# Patient Record
Sex: FEMALE | Race: WHITE
[De-identification: names, ages, dates, MRNs, and addresses within clinical notes are randomized per-mention and may not be internally consistent; named-entity substitution may affect disease eponyms.]

---

## 2017-01-05 ENCOUNTER — HOSPITAL ENCOUNTER (EMERGENCY)
Dept: HOSPITAL 83 - ED | Age: 47
Discharge: HOME | End: 2017-01-05
Payer: MEDICAID

## 2017-01-05 VITALS — BODY MASS INDEX: 21.97 KG/M2 | WEIGHT: 140 LBS | HEIGHT: 66.97 IN

## 2017-01-05 VITALS — DIASTOLIC BLOOD PRESSURE: 88 MMHG

## 2017-01-05 DIAGNOSIS — Z79.899: ICD-10-CM

## 2017-01-05 DIAGNOSIS — Z88.6: ICD-10-CM

## 2017-01-05 DIAGNOSIS — R03.0: ICD-10-CM

## 2017-01-05 DIAGNOSIS — M79.662: Primary | ICD-10-CM

## 2017-01-05 DIAGNOSIS — F17.200: ICD-10-CM

## 2017-03-30 ENCOUNTER — HOSPITAL ENCOUNTER (EMERGENCY)
Dept: HOSPITAL 83 - ED | Age: 47
Discharge: HOME | End: 2017-03-30
Payer: MEDICAID

## 2017-03-30 VITALS — WEIGHT: 120 LBS | HEIGHT: 65 IN | BODY MASS INDEX: 19.99 KG/M2

## 2017-03-30 VITALS — DIASTOLIC BLOOD PRESSURE: 100 MMHG

## 2017-03-30 DIAGNOSIS — Z79.899: ICD-10-CM

## 2017-03-30 DIAGNOSIS — F17.200: ICD-10-CM

## 2017-03-30 DIAGNOSIS — Z88.6: ICD-10-CM

## 2017-03-30 DIAGNOSIS — F32.2: Primary | ICD-10-CM

## 2017-03-30 LAB
ALBUMIN SERPL-MCNC: 3.6 GM/DL (ref 3.1–4.5)
ALP SERPL-CCNC: 68 U/L (ref 45–117)
ALT SERPL W P-5'-P-CCNC: 20 U/L (ref 12–78)
AMPHETAMINES UR QL SCN: < 1000
AST SERPL-CCNC: 22 IU/L (ref 3–35)
B-HCG SERPL-ACNC: (no result)
BARBITURATES UR QL SCN: < 200
BASOPHILS # BLD AUTO: 0.1 10*3/UL (ref 0–0.1)
BASOPHILS NFR BLD AUTO: 1 % (ref 0–1)
BUN SERPL-MCNC: 18 MG/DL (ref 7–24)
BZE UR QL SCN: > 300
CHLORIDE SERPL-SCNC: 105 MMOL/L (ref 98–107)
CO2 SERPL-SCNC: 28 MMOL/L (ref 21–32)
EOSINOPHIL # BLD AUTO: 0.3 10*3/UL (ref 0–0.4)
EOSINOPHIL # BLD AUTO: 3.1 % (ref 1–4)
ERYTHROCYTE [DISTWIDTH] IN BLOOD BY AUTOMATED COUNT: 13.6 % (ref 0–14.5)
GLUCOSE SERPL-MCNC: 91 MG/DL (ref 65–99)
HCT VFR BLD AUTO: 42.2 % (ref 37–47)
HGB BLD-MCNC: 14.3 G/DL (ref 12–16)
IG #: 0 10*3/UL (ref 0–0.1)
LYMPHOCYTES # BLD AUTO: 2.4 10*3/UL (ref 1.3–4.4)
LYMPHOCYTES NFR BLD AUTO: 26.9 % (ref 27–41)
MCH RBC QN AUTO: 32.1 PG (ref 27–31)
MCHC RBC AUTO-ENTMCNC: 33.9 G/DL (ref 33–37)
MCV RBC AUTO: 94.6 FL (ref 81–99)
MONOCYTES # BLD AUTO: 0.5 10*3/UL (ref 0.1–1)
MONOCYTES NFR BLD MANUAL: 5.6 % (ref 3–9)
MYOGLOBIN SERPL-MCNC: 64 NG/ML (ref 13–71)
NEUT #: 5.7 10*3/UL (ref 2.3–7.9)
NEUT %: 63.1 % (ref 47–73)
NRBC BLD QL AUTO: 0 % (ref 0–0)
PLATELET # BLD AUTO: 171 10*3/UL (ref 130–400)
PMV BLD AUTO: 11 FL (ref 9.6–12.3)
POTASSIUM SERPL-SCNC: 4.2 MMOL/L (ref 3.5–5.1)
PROT SERPL-MCNC: 7.4 GM/DL (ref 6.4–8.2)
RBC # BLD AUTO: 4.46 10*6/UL (ref 4.1–5.1)
SODIUM SERPL-SCNC: 141 MMOL/L (ref 136–145)
TROPONIN I SERPL-MCNC: < 0.015 NG/ML (ref ?–0.04)
WBC NRBC COR # BLD AUTO: 9.1 10*3/UL (ref 4.8–10.8)

## 2017-03-31 LAB
ALBUMIN SERPL-MCNC: NEGATIVE G/DL
APPEARANCE UR: (no result)
BACTERIA #/AREA URNS HPF: (no result) /[HPF]
BILIRUB UR QL STRIP: NEGATIVE
COLOR UR: YELLOW
CRYSTALS URNS MICRO: (no result)
EPI CELLS #/AREA URNS HPF: (no result) /[HPF]
GLUCOSE UR QL: NEGATIVE
HGB UR QL STRIP: NEGATIVE
KETONES UR QL STRIP: NEGATIVE
LEUKOCYTE ESTERASE UR QL STRIP: NEGATIVE
NITRITE UR QL STRIP: NEGATIVE
PH UR STRIP: 6.5 [PH] (ref 5–9)
RBC #/AREA URNS HPF: (no result) RBC/HPF (ref 0–2)
SP GR UR: 1.02 (ref 1–1.03)
URINE REFLEX COMMENT: YES
UROBILINOGEN UR STRIP-MCNC: 0.2 E.U./DL (ref 0.2–1)
WBC #/AREA URNS HPF: (no result) WBC/HPF (ref 0–5)

## 2017-08-16 ENCOUNTER — HOSPITAL ENCOUNTER (EMERGENCY)
Dept: HOSPITAL 83 - ED | Age: 47
Discharge: HOME | End: 2017-08-16
Payer: MEDICAID

## 2017-08-16 VITALS — SYSTOLIC BLOOD PRESSURE: 152 MMHG | DIASTOLIC BLOOD PRESSURE: 100 MMHG

## 2017-08-16 VITALS — WEIGHT: 145 LBS | HEIGHT: 67.99 IN | BODY MASS INDEX: 21.98 KG/M2

## 2017-08-16 DIAGNOSIS — F17.200: ICD-10-CM

## 2017-08-16 DIAGNOSIS — R31.9: ICD-10-CM

## 2017-08-16 DIAGNOSIS — Z79.899: ICD-10-CM

## 2017-08-16 DIAGNOSIS — N39.0: Primary | ICD-10-CM

## 2017-08-16 DIAGNOSIS — Z88.6: ICD-10-CM

## 2017-08-16 DIAGNOSIS — Z98.890: ICD-10-CM

## 2017-08-16 LAB
ALBUMIN SERPL-MCNC: (no result) G/DL
ALBUMIN SERPL-MCNC: 3.8 GM/DL (ref 3.1–4.5)
ALP SERPL-CCNC: 72 U/L (ref 45–117)
ALT SERPL W P-5'-P-CCNC: 18 U/L (ref 12–78)
APPEARANCE UR: (no result)
AST SERPL-CCNC: 26 IU/L (ref 3–35)
BACTERIA #/AREA URNS HPF: (no result) /[HPF]
BASOPHILS # BLD AUTO: 0.1 10*3/UL (ref 0–0.1)
BASOPHILS NFR BLD AUTO: 0.9 % (ref 0–1)
BILIRUB UR QL STRIP: NEGATIVE
BUN SERPL-MCNC: 19 MG/DL (ref 7–24)
CHLORIDE SERPL-SCNC: 105 MMOL/L (ref 98–107)
CO2 SERPL-SCNC: 21 MMOL/L (ref 21–32)
COLOR UR: YELLOW
CRP SERPL-MCNC: 0.45 MG/DL (ref 0–0.3)
EOSINOPHIL # BLD AUTO: 0.2 10*3/UL (ref 0–0.4)
EOSINOPHIL # BLD AUTO: 2 % (ref 1–4)
ERYTHROCYTE [DISTWIDTH] IN BLOOD BY AUTOMATED COUNT: 13.1 % (ref 0–14.5)
GLUCOSE SERPL-MCNC: 122 MG/DL (ref 65–99)
GLUCOSE UR QL: NEGATIVE
HCT VFR BLD AUTO: 47.4 % (ref 37–47)
HGB BLD-MCNC: 15.9 G/DL (ref 12–16)
HGB UR QL STRIP: (no result)
IG #: 0 10*3/UL (ref 0–0.1)
KETONES UR QL STRIP: NEGATIVE
LEUKOCYTE ESTERASE UR QL STRIP: NEGATIVE
LYMPHOCYTES # BLD AUTO: 2.4 10*3/UL (ref 1.3–4.4)
LYMPHOCYTES NFR BLD AUTO: 23.6 % (ref 27–41)
MAGNESIUM SERPL-MCNC: 1.9 MG/DL (ref 1.5–2.1)
MCH RBC QN AUTO: 32.4 PG (ref 27–31)
MCHC RBC AUTO-ENTMCNC: 33.5 G/DL (ref 33–37)
MCV RBC AUTO: 96.7 FL (ref 81–99)
MONOCYTES # BLD AUTO: 0.7 10*3/UL (ref 0.1–1)
MONOCYTES NFR BLD MANUAL: 7 % (ref 3–9)
NEUT #: 6.7 10*3/UL (ref 2.3–7.9)
NEUT %: 66.2 % (ref 47–73)
NITRITE UR QL STRIP: NEGATIVE
NRBC BLD QL AUTO: 0 10*3/UL (ref 0–0)
PH UR STRIP: 6 [PH] (ref 5–9)
PLATELET # BLD AUTO: 227 10*3/UL (ref 130–400)
PMV BLD AUTO: 11.1 FL (ref 9.6–12.3)
POTASSIUM SERPL-SCNC: 5 MMOL/L (ref 3.5–5.1)
PROT SERPL-MCNC: 8.3 GM/DL (ref 6.4–8.2)
RBC # BLD AUTO: 4.9 10*6/UL (ref 4.1–5.1)
RBC #/AREA URNS HPF: (no result) RBC/HPF (ref 0–2)
SODIUM SERPL-SCNC: 135 MMOL/L (ref 136–145)
SP GR UR: >= 1.03 (ref 1–1.03)
URINE REFLEX COMMENT: YES
UROBILINOGEN UR STRIP-MCNC: 0.2 E.U./DL (ref 0.2–1)
WBC #/AREA URNS HPF: (no result) WBC/HPF (ref 0–5)
WBC NRBC COR # BLD AUTO: 10.1 10*3/UL (ref 4.8–10.8)

## 2017-08-17 ENCOUNTER — HOSPITAL ENCOUNTER (EMERGENCY)
Dept: HOSPITAL 83 - ED | Age: 47
End: 2017-08-17
Payer: MEDICAID

## 2017-08-17 VITALS — SYSTOLIC BLOOD PRESSURE: 142 MMHG | DIASTOLIC BLOOD PRESSURE: 80 MMHG

## 2017-08-17 VITALS — HEIGHT: 55 IN | WEIGHT: 148 LBS

## 2017-08-17 DIAGNOSIS — Z88.6: ICD-10-CM

## 2017-08-17 DIAGNOSIS — N39.0: Primary | ICD-10-CM

## 2017-08-17 DIAGNOSIS — Z98.890: ICD-10-CM

## 2017-08-17 DIAGNOSIS — Z79.899: ICD-10-CM

## 2017-08-17 DIAGNOSIS — F17.200: ICD-10-CM

## 2017-08-17 DIAGNOSIS — F32.9: ICD-10-CM

## 2017-08-26 ENCOUNTER — HOSPITAL ENCOUNTER (INPATIENT)
Dept: HOSPITAL 83 - ED | Age: 47
LOS: 5 days | Discharge: HOME | DRG: 872 | End: 2017-08-31
Attending: INTERNAL MEDICINE | Admitting: INTERNAL MEDICINE
Payer: MEDICAID

## 2017-08-26 VITALS — SYSTOLIC BLOOD PRESSURE: 121 MMHG | DIASTOLIC BLOOD PRESSURE: 86 MMHG

## 2017-08-26 VITALS — BODY MASS INDEX: 23.23 KG/M2 | HEIGHT: 66.97 IN | WEIGHT: 148 LBS

## 2017-08-26 VITALS — DIASTOLIC BLOOD PRESSURE: 91 MMHG | SYSTOLIC BLOOD PRESSURE: 112 MMHG

## 2017-08-26 VITALS — DIASTOLIC BLOOD PRESSURE: 74 MMHG

## 2017-08-26 DIAGNOSIS — A41.9: Primary | ICD-10-CM

## 2017-08-26 DIAGNOSIS — Z81.1: ICD-10-CM

## 2017-08-26 DIAGNOSIS — R82.2: ICD-10-CM

## 2017-08-26 DIAGNOSIS — Z84.89: ICD-10-CM

## 2017-08-26 DIAGNOSIS — F31.60: ICD-10-CM

## 2017-08-26 DIAGNOSIS — E83.51: ICD-10-CM

## 2017-08-26 DIAGNOSIS — K21.9: ICD-10-CM

## 2017-08-26 DIAGNOSIS — Z82.49: ICD-10-CM

## 2017-08-26 DIAGNOSIS — R31.9: ICD-10-CM

## 2017-08-26 DIAGNOSIS — Z59.0: ICD-10-CM

## 2017-08-26 DIAGNOSIS — F17.210: ICD-10-CM

## 2017-08-26 DIAGNOSIS — N39.0: ICD-10-CM

## 2017-08-26 DIAGNOSIS — Z88.6: ICD-10-CM

## 2017-08-26 LAB
ALBUMIN SERPL-MCNC: 3.7 GM/DL (ref 3.1–4.5)
ALP SERPL-CCNC: 66 U/L (ref 45–117)
ALT SERPL W P-5'-P-CCNC: 15 U/L (ref 12–78)
APPEARANCE UR: (no result)
AST SERPL-CCNC: 14 IU/L (ref 3–35)
BACTERIA #/AREA URNS HPF: (no result) /[HPF]
BASOPHILS # BLD AUTO: 0.1 10*3/UL (ref 0–0.1)
BASOPHILS NFR BLD AUTO: 0.8 % (ref 0–1)
BILIRUB UR QL STRIP: (no result)
BUN SERPL-MCNC: 19 MG/DL (ref 7–24)
CHLORIDE SERPL-SCNC: 106 MMOL/L (ref 98–107)
COLOR UR: YELLOW
CREAT SERPL-MCNC: 0.93 MG/DL (ref 0.55–1.02)
EOSINOPHIL # BLD AUTO: 0.2 10*3/UL (ref 0–0.4)
EOSINOPHIL # BLD AUTO: 1.7 % (ref 1–4)
EPI CELLS #/AREA URNS HPF: (no result) /[HPF]
ERYTHROCYTE [DISTWIDTH] IN BLOOD BY AUTOMATED COUNT: 12.6 % (ref 0–14.5)
GLUCOSE UR QL: NEGATIVE
HCT VFR BLD AUTO: 46 % (ref 37–47)
HGB BLD-MCNC: 15.4 G/DL (ref 12–16)
HGB UR QL STRIP: (no result)
KETONES UR QL STRIP: (no result)
LEUKOCYTE ESTERASE UR QL STRIP: (no result)
LYMPHOCYTES # BLD AUTO: 2.3 10*3/UL (ref 1.3–4.4)
LYMPHOCYTES NFR BLD AUTO: 23.4 % (ref 27–41)
MCH RBC QN AUTO: 32.6 PG (ref 27–31)
MCHC RBC AUTO-ENTMCNC: 33.5 G/DL (ref 33–37)
MCV RBC AUTO: 97.3 FL (ref 81–99)
MONOCYTES # BLD AUTO: 0.5 10*3/UL (ref 0.1–1)
MONOCYTES NFR BLD MANUAL: 5.6 % (ref 3–9)
NEUT #: 6.6 10*3/UL (ref 2.3–7.9)
NEUT %: 68.2 % (ref 47–73)
NITRITE UR QL STRIP: NEGATIVE
NRBC BLD QL AUTO: 0 10*3/UL (ref 0–0)
PH UR STRIP: 6 [PH] (ref 5–9)
PLATELET # BLD AUTO: 195 10*3/UL (ref 130–400)
PMV BLD AUTO: 11.9 FL (ref 9.6–12.3)
POTASSIUM SERPL-SCNC: 4.3 MMOL/L (ref 3.5–5.1)
PROT SERPL-MCNC: 7.5 GM/DL (ref 6.4–8.2)
RBC # BLD AUTO: 4.73 10*6/UL (ref 4.1–5.1)
SODIUM SERPL-SCNC: 139 MMOL/L (ref 136–145)
SP GR UR: >= 1.03 (ref 1–1.03)
UROBILINOGEN UR STRIP-MCNC: 0.2 E.U./DL (ref 0.2–1)
WBC NRBC COR # BLD AUTO: 9.7 10*3/UL (ref 4.8–10.8)

## 2017-08-26 SDOH — ECONOMIC STABILITY - HOUSING INSECURITY: HOMELESSNESS: Z59.0

## 2017-08-27 VITALS — DIASTOLIC BLOOD PRESSURE: 100 MMHG

## 2017-08-27 VITALS — DIASTOLIC BLOOD PRESSURE: 82 MMHG

## 2017-08-27 VITALS — SYSTOLIC BLOOD PRESSURE: 138 MMHG | DIASTOLIC BLOOD PRESSURE: 90 MMHG

## 2017-08-27 VITALS — DIASTOLIC BLOOD PRESSURE: 88 MMHG | SYSTOLIC BLOOD PRESSURE: 122 MMHG

## 2017-08-27 VITALS — SYSTOLIC BLOOD PRESSURE: 134 MMHG | DIASTOLIC BLOOD PRESSURE: 90 MMHG

## 2017-08-27 LAB
25(OH)D3 SERPL-MCNC: 71.1 NG/ML (ref 30–100)
ALBUMIN SERPL-MCNC: 3.4 GM/DL (ref 3.1–4.5)
ALP SERPL-CCNC: 79 U/L (ref 45–117)
ALT SERPL W P-5'-P-CCNC: 13 U/L (ref 12–78)
APTT PPP: 26.7 SECONDS (ref 20.8–31.5)
AST SERPL-CCNC: 14 IU/L (ref 3–35)
BASOPHILS # BLD AUTO: 0.1 10*3/UL (ref 0–0.1)
BASOPHILS NFR BLD AUTO: 0.7 % (ref 0–1)
BUN SERPL-MCNC: 14 MG/DL (ref 7–24)
CHLORIDE SERPL-SCNC: 109 MMOL/L (ref 98–107)
CHOLEST SERPL-MCNC: 171 MG/DL (ref ?–200)
CREAT SERPL-MCNC: 0.71 MG/DL (ref 0.55–1.02)
EOSINOPHIL # BLD AUTO: 0.4 10*3/UL (ref 0–0.4)
EOSINOPHIL # BLD AUTO: 3.7 % (ref 1–4)
ERYTHROCYTE [DISTWIDTH] IN BLOOD BY AUTOMATED COUNT: 12.7 % (ref 0–14.5)
HCT VFR BLD AUTO: 44.7 % (ref 37–47)
HDLC SERPL-MCNC: 86 MG/DL (ref 40–60)
HGB BLD-MCNC: 15 G/DL (ref 12–16)
INR BLD: 1 (ref 2–3.5)
LDLC SERPL DIRECT ASSAY-MCNC: 71 MG/DL (ref 9–159)
LYMPHOCYTES # BLD AUTO: 3.7 10*3/UL (ref 1.3–4.4)
LYMPHOCYTES NFR BLD AUTO: 31 % (ref 27–41)
MAGNESIUM SERPL-MCNC: 2 MG/DL (ref 1.5–2.1)
MCH RBC QN AUTO: 33.2 PG (ref 27–31)
MCHC RBC AUTO-ENTMCNC: 33.6 G/DL (ref 33–37)
MCV RBC AUTO: 98.9 FL (ref 81–99)
MONOCYTES # BLD AUTO: 0.7 10*3/UL (ref 0.1–1)
MONOCYTES NFR BLD MANUAL: 6 % (ref 3–9)
NEUT #: 6.8 10*3/UL (ref 2.3–7.9)
NEUT %: 58.3 % (ref 47–73)
NRBC BLD QL AUTO: 0 10*3/UL (ref 0–0)
PHOSPHATE SERPL-MCNC: 3.7 MG/DL (ref 2.5–4.9)
PLATELET # BLD AUTO: 170 10*3/UL (ref 130–400)
PMV BLD AUTO: 11.9 FL (ref 9.6–12.3)
POTASSIUM SERPL-SCNC: 4 MMOL/L (ref 3.5–5.1)
PROT SERPL-MCNC: 7 GM/DL (ref 6.4–8.2)
RBC # BLD AUTO: 4.52 10*6/UL (ref 4.1–5.1)
SODIUM SERPL-SCNC: 141 MMOL/L (ref 136–145)
T4 FREE SERPL-MCNC: 1.16 NG/DL (ref 0.76–1.46)
TRIGL SERPL-MCNC: 68 MG/DL (ref ?–150)
TSH SERPL DL<=0.005 MIU/L-ACNC: 1.96 UIU/ML (ref 0.36–4.75)
VITAMIN B12: 251 PG/ML (ref 247–911)
VLDLC SERPL CALC-MCNC: 14 MG/DL (ref 6–40)
WBC NRBC COR # BLD AUTO: 11.8 10*3/UL (ref 4.8–10.8)

## 2017-08-28 VITALS — DIASTOLIC BLOOD PRESSURE: 83 MMHG

## 2017-08-28 VITALS — DIASTOLIC BLOOD PRESSURE: 86 MMHG

## 2017-08-28 VITALS — SYSTOLIC BLOOD PRESSURE: 148 MMHG | DIASTOLIC BLOOD PRESSURE: 80 MMHG

## 2017-08-28 VITALS — DIASTOLIC BLOOD PRESSURE: 78 MMHG

## 2017-08-28 VITALS — DIASTOLIC BLOOD PRESSURE: 60 MMHG

## 2017-08-28 LAB
BUN SERPL-MCNC: 15 MG/DL (ref 7–24)
CHLORIDE SERPL-SCNC: 104 MMOL/L (ref 98–107)
CREAT SERPL-MCNC: 0.8 MG/DL (ref 0.55–1.02)
POTASSIUM SERPL-SCNC: 4.1 MMOL/L (ref 3.5–5.1)
SODIUM SERPL-SCNC: 138 MMOL/L (ref 136–145)

## 2017-08-28 PROCEDURE — 02HV33Z INSERTION OF INFUSION DEVICE INTO SUPERIOR VENA CAVA, PERCUTANEOUS APPROACH: ICD-10-PCS | Performed by: INTERNAL MEDICINE

## 2017-08-29 VITALS — DIASTOLIC BLOOD PRESSURE: 66 MMHG | SYSTOLIC BLOOD PRESSURE: 108 MMHG

## 2017-08-29 VITALS — DIASTOLIC BLOOD PRESSURE: 76 MMHG

## 2017-08-29 VITALS — DIASTOLIC BLOOD PRESSURE: 75 MMHG

## 2017-08-29 VITALS — DIASTOLIC BLOOD PRESSURE: 86 MMHG

## 2017-08-29 VITALS — DIASTOLIC BLOOD PRESSURE: 77 MMHG | SYSTOLIC BLOOD PRESSURE: 95 MMHG

## 2017-08-29 LAB
BASOPHILS # BLD AUTO: 0.1 10*3/UL (ref 0–0.1)
BASOPHILS NFR BLD AUTO: 0.7 % (ref 0–1)
BUN SERPL-MCNC: 17 MG/DL (ref 7–24)
CHLORIDE SERPL-SCNC: 104 MMOL/L (ref 98–107)
CREAT SERPL-MCNC: 0.78 MG/DL (ref 0.55–1.02)
EOSINOPHIL # BLD AUTO: 0.4 10*3/UL (ref 0–0.4)
EOSINOPHIL # BLD AUTO: 4 % (ref 1–4)
ERYTHROCYTE [DISTWIDTH] IN BLOOD BY AUTOMATED COUNT: 12.7 % (ref 0–14.5)
HCT VFR BLD AUTO: 42.9 % (ref 37–47)
HGB BLD-MCNC: 14.1 G/DL (ref 12–16)
LYMPHOCYTES # BLD AUTO: 3.2 10*3/UL (ref 1.3–4.4)
LYMPHOCYTES NFR BLD AUTO: 30.3 % (ref 27–41)
MCH RBC QN AUTO: 31.9 PG (ref 27–31)
MCHC RBC AUTO-ENTMCNC: 32.9 G/DL (ref 33–37)
MCV RBC AUTO: 97.1 FL (ref 81–99)
MONOCYTES # BLD AUTO: 0.7 10*3/UL (ref 0.1–1)
MONOCYTES NFR BLD MANUAL: 6.3 % (ref 3–9)
NEUT #: 6.2 10*3/UL (ref 2.3–7.9)
NEUT %: 58.5 % (ref 47–73)
NRBC BLD QL AUTO: 0 % (ref 0–0)
PLATELET # BLD AUTO: 159 10*3/UL (ref 130–400)
PMV BLD AUTO: 11.7 FL (ref 9.6–12.3)
POTASSIUM SERPL-SCNC: 4.1 MMOL/L (ref 3.5–5.1)
RBC # BLD AUTO: 4.42 10*6/UL (ref 4.1–5.1)
SODIUM SERPL-SCNC: 137 MMOL/L (ref 136–145)
WBC NRBC COR # BLD AUTO: 10.6 10*3/UL (ref 4.8–10.8)

## 2017-08-30 VITALS — DIASTOLIC BLOOD PRESSURE: 83 MMHG | SYSTOLIC BLOOD PRESSURE: 126 MMHG

## 2017-08-30 VITALS — DIASTOLIC BLOOD PRESSURE: 96 MMHG | SYSTOLIC BLOOD PRESSURE: 128 MMHG

## 2017-08-30 VITALS — DIASTOLIC BLOOD PRESSURE: 52 MMHG | SYSTOLIC BLOOD PRESSURE: 99 MMHG

## 2017-08-30 VITALS — DIASTOLIC BLOOD PRESSURE: 76 MMHG

## 2017-08-30 VITALS — DIASTOLIC BLOOD PRESSURE: 89 MMHG

## 2017-08-31 VITALS — DIASTOLIC BLOOD PRESSURE: 89 MMHG

## 2017-08-31 VITALS — DIASTOLIC BLOOD PRESSURE: 68 MMHG | SYSTOLIC BLOOD PRESSURE: 114 MMHG

## 2017-09-24 ENCOUNTER — HOSPITAL ENCOUNTER (EMERGENCY)
Dept: HOSPITAL 83 - ED | Age: 47
Discharge: HOME | End: 2017-09-24
Payer: MEDICAID

## 2017-09-24 VITALS — SYSTOLIC BLOOD PRESSURE: 131 MMHG | DIASTOLIC BLOOD PRESSURE: 89 MMHG

## 2017-09-24 VITALS — HEIGHT: 67.99 IN | WEIGHT: 148 LBS | BODY MASS INDEX: 22.43 KG/M2

## 2017-09-24 DIAGNOSIS — R31.9: ICD-10-CM

## 2017-09-24 DIAGNOSIS — F17.200: ICD-10-CM

## 2017-09-24 DIAGNOSIS — Z88.6: ICD-10-CM

## 2017-09-24 DIAGNOSIS — N39.0: Primary | ICD-10-CM

## 2017-09-24 LAB
APPEARANCE UR: (no result)
BACTERIA #/AREA URNS HPF: (no result) /[HPF]
BILIRUB UR QL STRIP: NEGATIVE
COLOR UR: YELLOW
CRYSTALS URNS MICRO: (no result)
EPI CELLS #/AREA URNS HPF: (no result) /[HPF]
GLUCOSE UR QL: NEGATIVE
HGB UR QL STRIP: (no result)
KETONES UR QL STRIP: NEGATIVE
LEUKOCYTE ESTERASE UR QL STRIP: NEGATIVE
NITRITE UR QL STRIP: NEGATIVE
PH UR STRIP: 5 [PH] (ref 5–9)
RBC #/AREA URNS HPF: (no result) RBC/HPF (ref 0–2)
SP GR UR: >= 1.03 (ref 1–1.03)
UROBILINOGEN UR STRIP-MCNC: 0.2 E.U./DL (ref 0.2–1)
WBC #/AREA URNS HPF: (no result) WBC/HPF (ref 0–5)

## 2017-12-22 ENCOUNTER — HOSPITAL ENCOUNTER (INPATIENT)
Dept: HOSPITAL 83 - ED | Age: 47
LOS: 4 days | Discharge: HOME | DRG: 206 | End: 2017-12-26
Attending: EMERGENCY MEDICINE | Admitting: EMERGENCY MEDICINE
Payer: MEDICAID

## 2017-12-22 VITALS — WEIGHT: 142.13 LBS | DIASTOLIC BLOOD PRESSURE: 82 MMHG | HEIGHT: 68 IN | BODY MASS INDEX: 21.54 KG/M2

## 2017-12-22 VITALS — SYSTOLIC BLOOD PRESSURE: 114 MMHG | DIASTOLIC BLOOD PRESSURE: 78 MMHG

## 2017-12-22 VITALS — SYSTOLIC BLOOD PRESSURE: 140 MMHG | DIASTOLIC BLOOD PRESSURE: 85 MMHG

## 2017-12-22 VITALS — DIASTOLIC BLOOD PRESSURE: 89 MMHG

## 2017-12-22 DIAGNOSIS — G43.909: ICD-10-CM

## 2017-12-22 DIAGNOSIS — M94.0: Primary | ICD-10-CM

## 2017-12-22 DIAGNOSIS — Z79.899: ICD-10-CM

## 2017-12-22 DIAGNOSIS — Z81.1: ICD-10-CM

## 2017-12-22 DIAGNOSIS — K21.9: ICD-10-CM

## 2017-12-22 DIAGNOSIS — Z82.49: ICD-10-CM

## 2017-12-22 DIAGNOSIS — F17.210: ICD-10-CM

## 2017-12-22 DIAGNOSIS — Z88.6: ICD-10-CM

## 2017-12-22 DIAGNOSIS — J44.9: ICD-10-CM

## 2017-12-22 DIAGNOSIS — R45.851: ICD-10-CM

## 2017-12-22 DIAGNOSIS — F41.9: ICD-10-CM

## 2017-12-22 DIAGNOSIS — F31.60: ICD-10-CM

## 2017-12-22 LAB
ALBUMIN SERPL-MCNC: 3.7 GM/DL (ref 3.1–4.5)
ALP SERPL-CCNC: 71 U/L (ref 45–117)
ALT SERPL W P-5'-P-CCNC: 26 U/L (ref 12–78)
AMPHETAMINES UR QL SCN: < 1000
APPEARANCE UR: CLEAR
APTT PPP: 25.8 SECONDS (ref 20.8–31.5)
AST SERPL-CCNC: 19 IU/L (ref 3–35)
BACTERIA #/AREA URNS HPF: (no result) /[HPF]
BARBITURATES UR QL SCN: < 200
BASOPHILS # BLD AUTO: 0.1 10*3/UL (ref 0–0.1)
BASOPHILS NFR BLD AUTO: 0.9 % (ref 0–1)
BENZODIAZ UR QL SCN: < 200
BILIRUB UR QL STRIP: NEGATIVE
BUN SERPL-MCNC: 16 MG/DL (ref 7–24)
BZE UR QL SCN: < 300
CANNABINOIDS UR QL SCN: < 50
CHLORIDE SERPL-SCNC: 102 MMOL/L (ref 98–107)
COLOR UR: YELLOW
CREAT SERPL-MCNC: 0.84 MG/DL (ref 0.55–1.02)
EOSINOPHIL # BLD AUTO: 0.2 10*3/UL (ref 0–0.4)
EOSINOPHIL # BLD AUTO: 2.1 % (ref 1–4)
EPI CELLS #/AREA URNS HPF: (no result) /[HPF]
ERYTHROCYTE [DISTWIDTH] IN BLOOD BY AUTOMATED COUNT: 12.1 % (ref 0–14.5)
GLUCOSE UR QL: NEGATIVE
HCT VFR BLD AUTO: 40.9 % (ref 37–47)
HGB BLD-MCNC: 13.9 G/DL (ref 12–16)
HGB UR QL STRIP: NEGATIVE
INR BLD: 1 (ref 2–3.5)
KETONES UR QL STRIP: NEGATIVE
LEUKOCYTE ESTERASE UR QL STRIP: (no result)
LYMPHOCYTES # BLD AUTO: 2.6 10*3/UL (ref 1.3–4.4)
LYMPHOCYTES NFR BLD AUTO: 30.2 % (ref 27–41)
MCH RBC QN AUTO: 32.6 PG (ref 27–31)
MCHC RBC AUTO-ENTMCNC: 34 G/DL (ref 33–37)
MCV RBC AUTO: 96 FL (ref 81–99)
METHADONE UR QL SCN: < 300
MONOCYTES # BLD AUTO: 0.6 10*3/UL (ref 0.1–1)
MONOCYTES NFR BLD MANUAL: 6.9 % (ref 3–9)
NEUT #: 5.1 10*3/UL (ref 2.3–7.9)
NEUT %: 59.7 % (ref 47–73)
NITRITE UR QL STRIP: NEGATIVE
NRBC BLD QL AUTO: 0 10*3/UL (ref 0–0)
OPIATES UR QL SCN: < 300
PCP UR QL SCN: <  25
PH UR STRIP: 6 [PH] (ref 5–9)
PLATELET # BLD AUTO: 208 10*3/UL (ref 130–400)
PMV BLD AUTO: 10.8 FL (ref 9.6–12.3)
POTASSIUM SERPL-SCNC: 3.9 MMOL/L (ref 3.5–5.1)
PROT SERPL-MCNC: 6.9 GM/DL (ref 6.4–8.2)
RBC # BLD AUTO: 4.26 10*6/UL (ref 4.1–5.1)
SODIUM SERPL-SCNC: 137 MMOL/L (ref 136–145)
SP GR UR: 1.01 (ref 1–1.03)
TROPONIN I SERPL-MCNC: < 0.015 NG/ML (ref ?–0.04)
UROBILINOGEN UR STRIP-MCNC: 0.2 E.U./DL (ref 0.2–1)
WBC #/AREA URNS HPF: (no result) WBC/HPF (ref 0–5)
WBC NRBC COR # BLD AUTO: 8.5 10*3/UL (ref 4.8–10.8)

## 2017-12-23 VITALS — DIASTOLIC BLOOD PRESSURE: 68 MMHG | SYSTOLIC BLOOD PRESSURE: 104 MMHG

## 2017-12-23 VITALS — DIASTOLIC BLOOD PRESSURE: 83 MMHG | SYSTOLIC BLOOD PRESSURE: 130 MMHG

## 2017-12-23 VITALS — SYSTOLIC BLOOD PRESSURE: 147 MMHG | DIASTOLIC BLOOD PRESSURE: 84 MMHG

## 2017-12-23 VITALS — SYSTOLIC BLOOD PRESSURE: 153 MMHG | DIASTOLIC BLOOD PRESSURE: 78 MMHG

## 2017-12-23 VITALS — DIASTOLIC BLOOD PRESSURE: 86 MMHG

## 2017-12-23 VITALS — DIASTOLIC BLOOD PRESSURE: 66 MMHG

## 2017-12-23 LAB
25(OH)D3 SERPL-MCNC: 34.1 NG/ML (ref 30–100)
ALBUMIN SERPL-MCNC: 3.3 GM/DL (ref 3.1–4.5)
ALP SERPL-CCNC: 63 U/L (ref 45–117)
ALT SERPL W P-5'-P-CCNC: 23 U/L (ref 12–78)
AST SERPL-CCNC: 18 IU/L (ref 3–35)
BASOPHILS # BLD AUTO: 0.1 10*3/UL (ref 0–0.1)
BASOPHILS NFR BLD AUTO: 0.8 % (ref 0–1)
BUN SERPL-MCNC: 13 MG/DL (ref 7–24)
CHLORIDE SERPL-SCNC: 105 MMOL/L (ref 98–107)
CHOLEST SERPL-MCNC: 182 MG/DL (ref ?–200)
CREAT SERPL-MCNC: 0.75 MG/DL (ref 0.55–1.02)
EOSINOPHIL # BLD AUTO: 0.3 10*3/UL (ref 0–0.4)
EOSINOPHIL # BLD AUTO: 4 % (ref 1–4)
ERYTHROCYTE [DISTWIDTH] IN BLOOD BY AUTOMATED COUNT: 12.1 % (ref 0–14.5)
HCT VFR BLD AUTO: 43.3 % (ref 37–47)
HDLC SERPL-MCNC: 88 MG/DL (ref 40–60)
HGB BLD-MCNC: 14.8 G/DL (ref 12–16)
INR BLD: 1 (ref 2–3.5)
LDLC SERPL DIRECT ASSAY-MCNC: 76 MG/DL (ref 9–159)
LYMPHOCYTES # BLD AUTO: 3.8 10*3/UL (ref 1.3–4.4)
LYMPHOCYTES NFR BLD AUTO: 46.3 % (ref 27–41)
MCH RBC QN AUTO: 33.1 PG (ref 27–31)
MCHC RBC AUTO-ENTMCNC: 34.2 G/DL (ref 33–37)
MCV RBC AUTO: 96.9 FL (ref 81–99)
MONOCYTES # BLD AUTO: 0.5 10*3/UL (ref 0.1–1)
MONOCYTES NFR BLD MANUAL: 6.5 % (ref 3–9)
NEUT #: 3.5 10*3/UL (ref 2.3–7.9)
NEUT %: 42.3 % (ref 47–73)
NRBC BLD QL AUTO: 0 % (ref 0–0)
PHOSPHATE SERPL-MCNC: 4.4 MG/DL (ref 2.5–4.9)
PLATELET # BLD AUTO: 200 10*3/UL (ref 130–400)
PMV BLD AUTO: 10.8 FL (ref 9.6–12.3)
POTASSIUM SERPL-SCNC: 3.8 MMOL/L (ref 3.5–5.1)
PROT SERPL-MCNC: 6.8 GM/DL (ref 6.4–8.2)
RBC # BLD AUTO: 4.47 10*6/UL (ref 4.1–5.1)
SODIUM SERPL-SCNC: 139 MMOL/L (ref 136–145)
T4 FREE SERPL-MCNC: 1.1 NG/DL (ref 0.76–1.46)
TRIGL SERPL-MCNC: 90 MG/DL (ref ?–150)
TSH SERPL DL<=0.005 MIU/L-ACNC: 3.79 UIU/ML (ref 0.36–4.75)
VITAMIN B12: 428 PG/ML (ref 247–911)
VLDLC SERPL CALC-MCNC: 18 MG/DL (ref 6–40)
WBC NRBC COR # BLD AUTO: 8.3 10*3/UL (ref 4.8–10.8)

## 2017-12-24 VITALS — DIASTOLIC BLOOD PRESSURE: 70 MMHG | SYSTOLIC BLOOD PRESSURE: 159 MMHG

## 2017-12-24 VITALS — SYSTOLIC BLOOD PRESSURE: 126 MMHG | DIASTOLIC BLOOD PRESSURE: 75 MMHG

## 2017-12-24 VITALS — DIASTOLIC BLOOD PRESSURE: 87 MMHG | SYSTOLIC BLOOD PRESSURE: 135 MMHG

## 2017-12-24 VITALS — SYSTOLIC BLOOD PRESSURE: 114 MMHG | DIASTOLIC BLOOD PRESSURE: 72 MMHG

## 2017-12-24 VITALS — DIASTOLIC BLOOD PRESSURE: 81 MMHG

## 2017-12-24 VITALS — DIASTOLIC BLOOD PRESSURE: 74 MMHG

## 2017-12-25 VITALS — SYSTOLIC BLOOD PRESSURE: 120 MMHG | DIASTOLIC BLOOD PRESSURE: 70 MMHG

## 2017-12-25 VITALS — DIASTOLIC BLOOD PRESSURE: 61 MMHG | SYSTOLIC BLOOD PRESSURE: 90 MMHG

## 2017-12-25 VITALS — DIASTOLIC BLOOD PRESSURE: 76 MMHG | SYSTOLIC BLOOD PRESSURE: 104 MMHG

## 2017-12-25 VITALS — SYSTOLIC BLOOD PRESSURE: 110 MMHG | DIASTOLIC BLOOD PRESSURE: 83 MMHG

## 2017-12-25 VITALS — DIASTOLIC BLOOD PRESSURE: 76 MMHG

## 2017-12-25 VITALS — DIASTOLIC BLOOD PRESSURE: 90 MMHG

## 2017-12-26 VITALS — DIASTOLIC BLOOD PRESSURE: 68 MMHG

## 2017-12-26 VITALS — DIASTOLIC BLOOD PRESSURE: 67 MMHG

## 2017-12-26 VITALS — DIASTOLIC BLOOD PRESSURE: 77 MMHG

## 2017-12-26 LAB
BASOPHILS # BLD AUTO: 0.1 10*3/UL (ref 0–0.1)
BASOPHILS NFR BLD AUTO: 0.9 % (ref 0–1)
CREAT SERPL-MCNC: 0.74 MG/DL (ref 0.55–1.02)
EOSINOPHIL # BLD AUTO: 0.4 10*3/UL (ref 0–0.4)
EOSINOPHIL # BLD AUTO: 5.1 % (ref 1–4)
ERYTHROCYTE [DISTWIDTH] IN BLOOD BY AUTOMATED COUNT: 12 % (ref 0–14.5)
HCT VFR BLD AUTO: 40.7 % (ref 37–47)
HGB BLD-MCNC: 13.9 G/DL (ref 12–16)
LYMPHOCYTES # BLD AUTO: 2.9 10*3/UL (ref 1.3–4.4)
LYMPHOCYTES NFR BLD AUTO: 36.9 % (ref 27–41)
MCH RBC QN AUTO: 32.4 PG (ref 27–31)
MCHC RBC AUTO-ENTMCNC: 34.2 G/DL (ref 33–37)
MCV RBC AUTO: 94.9 FL (ref 81–99)
MONOCYTES # BLD AUTO: 0.7 10*3/UL (ref 0.1–1)
MONOCYTES NFR BLD MANUAL: 9.3 % (ref 3–9)
NEUT #: 3.7 10*3/UL (ref 2.3–7.9)
NEUT %: 47.5 % (ref 47–73)
NRBC BLD QL AUTO: 0 % (ref 0–0)
PLATELET # BLD AUTO: 205 10*3/UL (ref 130–400)
PMV BLD AUTO: 10.7 FL (ref 9.6–12.3)
RBC # BLD AUTO: 4.29 10*6/UL (ref 4.1–5.1)
WBC NRBC COR # BLD AUTO: 7.9 10*3/UL (ref 4.8–10.8)

## 2018-01-03 ENCOUNTER — HOSPITAL ENCOUNTER (INPATIENT)
Dept: HOSPITAL 83 - ED | Age: 48
LOS: 2 days | Discharge: HOME | DRG: 206 | End: 2018-01-05
Attending: EMERGENCY MEDICINE | Admitting: EMERGENCY MEDICINE
Payer: MEDICAID

## 2018-01-03 VITALS — BODY MASS INDEX: 21.72 KG/M2 | HEIGHT: 67.99 IN | WEIGHT: 143.31 LBS

## 2018-01-03 VITALS — DIASTOLIC BLOOD PRESSURE: 86 MMHG

## 2018-01-03 VITALS — SYSTOLIC BLOOD PRESSURE: 106 MMHG | DIASTOLIC BLOOD PRESSURE: 69 MMHG

## 2018-01-03 VITALS — DIASTOLIC BLOOD PRESSURE: 81 MMHG

## 2018-01-03 VITALS — DIASTOLIC BLOOD PRESSURE: 76 MMHG

## 2018-01-03 DIAGNOSIS — Z81.1: ICD-10-CM

## 2018-01-03 DIAGNOSIS — Z71.6: ICD-10-CM

## 2018-01-03 DIAGNOSIS — F41.9: ICD-10-CM

## 2018-01-03 DIAGNOSIS — Z82.49: ICD-10-CM

## 2018-01-03 DIAGNOSIS — F33.9: ICD-10-CM

## 2018-01-03 DIAGNOSIS — K21.9: ICD-10-CM

## 2018-01-03 DIAGNOSIS — E83.41: ICD-10-CM

## 2018-01-03 DIAGNOSIS — Z72.89: ICD-10-CM

## 2018-01-03 DIAGNOSIS — Z88.6: ICD-10-CM

## 2018-01-03 DIAGNOSIS — Z86.73: ICD-10-CM

## 2018-01-03 DIAGNOSIS — R27.9: ICD-10-CM

## 2018-01-03 DIAGNOSIS — Z79.899: ICD-10-CM

## 2018-01-03 DIAGNOSIS — Z72.0: ICD-10-CM

## 2018-01-03 DIAGNOSIS — Z84.89: ICD-10-CM

## 2018-01-03 DIAGNOSIS — Z87.440: ICD-10-CM

## 2018-01-03 DIAGNOSIS — Z86.74: ICD-10-CM

## 2018-01-03 DIAGNOSIS — M94.0: Primary | ICD-10-CM

## 2018-01-03 LAB
ALBUMIN SERPL-MCNC: 4.1 GM/DL (ref 3.1–4.5)
ALP SERPL-CCNC: 79 U/L (ref 45–117)
ALT SERPL W P-5'-P-CCNC: 24 U/L (ref 12–78)
APTT PPP: 26.3 SECONDS (ref 20.8–31.5)
AST SERPL-CCNC: 18 IU/L (ref 3–35)
BASOPHILS # BLD AUTO: 0.1 10*3/UL (ref 0–0.1)
BASOPHILS NFR BLD AUTO: 0.7 % (ref 0–1)
BUN SERPL-MCNC: 25 MG/DL (ref 7–24)
CHLORIDE SERPL-SCNC: 101 MMOL/L (ref 98–107)
CREAT SERPL-MCNC: 0.88 MG/DL (ref 0.55–1.02)
EOSINOPHIL # BLD AUTO: 0.3 10*3/UL (ref 0–0.4)
EOSINOPHIL # BLD AUTO: 3.6 % (ref 1–4)
ERYTHROCYTE [DISTWIDTH] IN BLOOD BY AUTOMATED COUNT: 11.9 % (ref 0–14.5)
HCT VFR BLD AUTO: 43.1 % (ref 37–47)
HGB BLD-MCNC: 14.6 G/DL (ref 12–16)
INR BLD: 1 (ref 2–3.5)
LYMPHOCYTES # BLD AUTO: 3.1 10*3/UL (ref 1.3–4.4)
LYMPHOCYTES NFR BLD AUTO: 32.7 % (ref 27–41)
MCH RBC QN AUTO: 32.3 PG (ref 27–31)
MCHC RBC AUTO-ENTMCNC: 33.9 G/DL (ref 33–37)
MCV RBC AUTO: 95.4 FL (ref 81–99)
MONOCYTES # BLD AUTO: 0.7 10*3/UL (ref 0.1–1)
MONOCYTES NFR BLD MANUAL: 7.4 % (ref 3–9)
NEUT #: 5.2 10*3/UL (ref 2.3–7.9)
NEUT %: 55.4 % (ref 47–73)
NRBC BLD QL AUTO: 0 10*3/UL (ref 0–0)
PLATELET # BLD AUTO: 223 10*3/UL (ref 130–400)
PMV BLD AUTO: 11.2 FL (ref 9.6–12.3)
POTASSIUM SERPL-SCNC: 4.3 MMOL/L (ref 3.5–5.1)
PROT SERPL-MCNC: 7.2 GM/DL (ref 6.4–8.2)
RBC # BLD AUTO: 4.52 10*6/UL (ref 4.1–5.1)
SODIUM SERPL-SCNC: 139 MMOL/L (ref 136–145)
TROPONIN I SERPL-MCNC: 0.08 NG/ML (ref ?–0.04)
WBC NRBC COR # BLD AUTO: 9.4 10*3/UL (ref 4.8–10.8)

## 2018-01-04 VITALS — SYSTOLIC BLOOD PRESSURE: 102 MMHG | DIASTOLIC BLOOD PRESSURE: 63 MMHG

## 2018-01-04 VITALS — SYSTOLIC BLOOD PRESSURE: 120 MMHG | DIASTOLIC BLOOD PRESSURE: 90 MMHG

## 2018-01-04 VITALS — DIASTOLIC BLOOD PRESSURE: 52 MMHG

## 2018-01-04 VITALS — DIASTOLIC BLOOD PRESSURE: 66 MMHG

## 2018-01-04 LAB
25(OH)D3 SERPL-MCNC: 31.3 NG/ML (ref 30–100)
ALBUMIN SERPL-MCNC: 3.9 GM/DL (ref 3.1–4.5)
ALP SERPL-CCNC: 91 U/L (ref 45–117)
ALT SERPL W P-5'-P-CCNC: 24 U/L (ref 12–78)
AST SERPL-CCNC: 19 IU/L (ref 3–35)
BASOPHILS # BLD AUTO: 0.1 10*3/UL (ref 0–0.1)
BASOPHILS NFR BLD AUTO: 0.8 % (ref 0–1)
BUN SERPL-MCNC: 31 MG/DL (ref 7–24)
CHLORIDE SERPL-SCNC: 104 MMOL/L (ref 98–107)
CHOLEST SERPL-MCNC: 187 MG/DL (ref ?–200)
CREAT SERPL-MCNC: 0.77 MG/DL (ref 0.55–1.02)
EOSINOPHIL # BLD AUTO: 0.4 10*3/UL (ref 0–0.4)
EOSINOPHIL # BLD AUTO: 4.7 % (ref 1–4)
ERYTHROCYTE [DISTWIDTH] IN BLOOD BY AUTOMATED COUNT: 11.9 % (ref 0–14.5)
HCT VFR BLD AUTO: 44 % (ref 37–47)
HDLC SERPL-MCNC: 84 MG/DL (ref 40–60)
HGB BLD-MCNC: 14.9 G/DL (ref 12–16)
INR BLD: 1 (ref 2–3.5)
LDLC SERPL DIRECT ASSAY-MCNC: 80 MG/DL (ref 9–159)
LYMPHOCYTES # BLD AUTO: 3.8 10*3/UL (ref 1.3–4.4)
LYMPHOCYTES NFR BLD AUTO: 41.5 % (ref 27–41)
MCH RBC QN AUTO: 32.7 PG (ref 27–31)
MCHC RBC AUTO-ENTMCNC: 33.9 G/DL (ref 33–37)
MCV RBC AUTO: 96.5 FL (ref 81–99)
MONOCYTES # BLD AUTO: 0.7 10*3/UL (ref 0.1–1)
MONOCYTES NFR BLD MANUAL: 7.5 % (ref 3–9)
NEUT #: 4.1 10*3/UL (ref 2.3–7.9)
NEUT %: 45.4 % (ref 47–73)
NRBC BLD QL AUTO: 0 10*3/UL (ref 0–0)
PHOSPHATE SERPL-MCNC: 4.9 MG/DL (ref 2.5–4.9)
PLATELET # BLD AUTO: 203 10*3/UL (ref 130–400)
PMV BLD AUTO: 11.8 FL (ref 9.6–12.3)
POTASSIUM SERPL-SCNC: 4.2 MMOL/L (ref 3.5–5.1)
PROT SERPL-MCNC: 7.2 GM/DL (ref 6.4–8.2)
RBC # BLD AUTO: 4.56 10*6/UL (ref 4.1–5.1)
SODIUM SERPL-SCNC: 138 MMOL/L (ref 136–145)
T4 FREE SERPL-MCNC: 0.94 NG/DL (ref 0.76–1.46)
TRIGL SERPL-MCNC: 116 MG/DL (ref ?–150)
TSH SERPL DL<=0.005 MIU/L-ACNC: 3.78 UIU/ML (ref 0.36–4.75)
VITAMIN B12: 328 PG/ML (ref 247–911)
VLDLC SERPL CALC-MCNC: 23 MG/DL (ref 6–40)
WBC NRBC COR # BLD AUTO: 9.1 10*3/UL (ref 4.8–10.8)

## 2018-01-04 PROCEDURE — 3E073KZ INTRODUCTION OF OTHER DIAGNOSTIC SUBSTANCE INTO CORONARY ARTERY, PERCUTANEOUS APPROACH: ICD-10-PCS

## 2018-01-04 PROCEDURE — 4A02XM4 MEASUREMENT OF CARDIAC TOTAL ACTIVITY, EXTERNAL APPROACH: ICD-10-PCS

## 2018-01-05 VITALS — DIASTOLIC BLOOD PRESSURE: 62 MMHG | SYSTOLIC BLOOD PRESSURE: 100 MMHG

## 2018-01-18 ENCOUNTER — HOSPITAL ENCOUNTER (INPATIENT)
Dept: HOSPITAL 83 - ED | Age: 48
LOS: 1 days | Discharge: HOME | DRG: 313 | End: 2018-01-19
Attending: INTERNAL MEDICINE | Admitting: INTERNAL MEDICINE
Payer: MEDICAID

## 2018-01-18 VITALS — DIASTOLIC BLOOD PRESSURE: 79 MMHG

## 2018-01-18 VITALS — SYSTOLIC BLOOD PRESSURE: 112 MMHG | DIASTOLIC BLOOD PRESSURE: 76 MMHG

## 2018-01-18 VITALS — WEIGHT: 143.19 LBS | HEIGHT: 68 IN | BODY MASS INDEX: 21.7 KG/M2

## 2018-01-18 VITALS — SYSTOLIC BLOOD PRESSURE: 111 MMHG | DIASTOLIC BLOOD PRESSURE: 75 MMHG

## 2018-01-18 VITALS — DIASTOLIC BLOOD PRESSURE: 73 MMHG

## 2018-01-18 VITALS — DIASTOLIC BLOOD PRESSURE: 77 MMHG

## 2018-01-18 VITALS — DIASTOLIC BLOOD PRESSURE: 77 MMHG | SYSTOLIC BLOOD PRESSURE: 126 MMHG

## 2018-01-18 DIAGNOSIS — F60.3: ICD-10-CM

## 2018-01-18 DIAGNOSIS — Z88.6: ICD-10-CM

## 2018-01-18 DIAGNOSIS — F12.10: ICD-10-CM

## 2018-01-18 DIAGNOSIS — J44.9: ICD-10-CM

## 2018-01-18 DIAGNOSIS — Z81.1: ICD-10-CM

## 2018-01-18 DIAGNOSIS — Z86.74: ICD-10-CM

## 2018-01-18 DIAGNOSIS — F31.60: ICD-10-CM

## 2018-01-18 DIAGNOSIS — E55.9: ICD-10-CM

## 2018-01-18 DIAGNOSIS — Z71.6: ICD-10-CM

## 2018-01-18 DIAGNOSIS — Z79.899: ICD-10-CM

## 2018-01-18 DIAGNOSIS — Z82.49: ICD-10-CM

## 2018-01-18 DIAGNOSIS — F17.210: ICD-10-CM

## 2018-01-18 DIAGNOSIS — G43.909: ICD-10-CM

## 2018-01-18 DIAGNOSIS — R07.9: Primary | ICD-10-CM

## 2018-01-18 DIAGNOSIS — K21.9: ICD-10-CM

## 2018-01-18 DIAGNOSIS — R74.0: ICD-10-CM

## 2018-01-18 DIAGNOSIS — Z86.73: ICD-10-CM

## 2018-01-18 DIAGNOSIS — M19.90: ICD-10-CM

## 2018-01-18 DIAGNOSIS — F41.9: ICD-10-CM

## 2018-01-18 LAB
ALBUMIN SERPL-MCNC: 3.6 GM/DL (ref 3.1–4.5)
ALP SERPL-CCNC: 89 U/L (ref 45–117)
ALT SERPL W P-5'-P-CCNC: 55 U/L (ref 12–78)
AMPHETAMINES UR QL SCN: < 1000
APPEARANCE UR: CLEAR
APTT PPP: 23.7 SECONDS (ref 20.8–31.5)
AST SERPL-CCNC: 46 IU/L (ref 3–35)
BACTERIA #/AREA URNS HPF: (no result) /[HPF]
BARBITURATES UR QL SCN: < 200
BASOPHILS # BLD AUTO: 0 10*3/UL (ref 0–0.1)
BASOPHILS NFR BLD AUTO: 0.4 % (ref 0–1)
BENZODIAZ UR QL SCN: < 200
BILIRUB UR QL STRIP: NEGATIVE
BUN SERPL-MCNC: 18 MG/DL (ref 7–24)
BZE UR QL SCN: < 300
CANNABINOIDS UR QL SCN: > 50
CHLORIDE SERPL-SCNC: 105 MMOL/L (ref 98–107)
COLOR UR: YELLOW
CREAT SERPL-MCNC: 0.8 MG/DL (ref 0.55–1.02)
EOSINOPHIL # BLD AUTO: 0.5 10*3/UL (ref 0–0.4)
EOSINOPHIL # BLD AUTO: 6.5 % (ref 1–4)
EPI CELLS #/AREA URNS HPF: (no result) /[HPF]
ERYTHROCYTE [DISTWIDTH] IN BLOOD BY AUTOMATED COUNT: 12.3 % (ref 0–14.5)
GLUCOSE UR QL: NEGATIVE
HCT VFR BLD AUTO: 39.1 % (ref 37–47)
HGB BLD-MCNC: 13.2 G/DL (ref 12–16)
HGB UR QL STRIP: NEGATIVE
INR BLD: 0.9 (ref 2–3.5)
KETONES UR QL STRIP: NEGATIVE
LEUKOCYTE ESTERASE UR QL STRIP: NEGATIVE
LYMPHOCYTES # BLD AUTO: 2.4 10*3/UL (ref 1.3–4.4)
LYMPHOCYTES NFR BLD AUTO: 33.6 % (ref 27–41)
MCH RBC QN AUTO: 32.4 PG (ref 27–31)
MCHC RBC AUTO-ENTMCNC: 33.8 G/DL (ref 33–37)
MCV RBC AUTO: 95.8 FL (ref 81–99)
METHADONE UR QL SCN: < 300
MONOCYTES # BLD AUTO: 0.7 10*3/UL (ref 0.1–1)
MONOCYTES NFR BLD MANUAL: 9.5 % (ref 3–9)
NEUT #: 3.6 10*3/UL (ref 2.3–7.9)
NEUT %: 49.7 % (ref 47–73)
NITRITE UR QL STRIP: NEGATIVE
NRBC BLD QL AUTO: 0 10*3/UL (ref 0–0)
OPIATES UR QL SCN: < 300
PCP UR QL SCN: <  25
PH UR STRIP: 7.5 [PH] (ref 5–9)
PLATELET # BLD AUTO: 203 10*3/UL (ref 130–400)
PMV BLD AUTO: 10.6 FL (ref 9.6–12.3)
POTASSIUM SERPL-SCNC: 4.9 MMOL/L (ref 3.5–5.1)
PROT SERPL-MCNC: 7.2 GM/DL (ref 6.4–8.2)
RBC # BLD AUTO: 4.08 10*6/UL (ref 4.1–5.1)
RBC #/AREA URNS HPF: (no result) RBC/HPF (ref 0–2)
SODIUM SERPL-SCNC: 140 MMOL/L (ref 136–145)
SP GR UR: 1.01 (ref 1–1.03)
TROPONIN I SERPL-MCNC: < 0.015 NG/ML (ref ?–0.04)
UROBILINOGEN UR STRIP-MCNC: 0.2 E.U./DL (ref 0.2–1)
WBC #/AREA URNS HPF: (no result) WBC/HPF (ref 0–5)
WBC NRBC COR # BLD AUTO: 7.2 10*3/UL (ref 4.8–10.8)

## 2018-01-19 VITALS — DIASTOLIC BLOOD PRESSURE: 77 MMHG

## 2018-01-19 VITALS — SYSTOLIC BLOOD PRESSURE: 121 MMHG | DIASTOLIC BLOOD PRESSURE: 80 MMHG

## 2018-01-19 VITALS — DIASTOLIC BLOOD PRESSURE: 78 MMHG | SYSTOLIC BLOOD PRESSURE: 120 MMHG

## 2018-01-19 LAB
25(OH)D3 SERPL-MCNC: 28.1 NG/ML (ref 30–100)
ALBUMIN SERPL-MCNC: 3.4 GM/DL (ref 3.1–4.5)
ALP SERPL-CCNC: 84 U/L (ref 45–117)
ALT SERPL W P-5'-P-CCNC: 45 U/L (ref 12–78)
AST SERPL-CCNC: 33 IU/L (ref 3–35)
BASOPHILS # BLD AUTO: 0 10*3/UL (ref 0–0.1)
BASOPHILS NFR BLD AUTO: 0.7 % (ref 0–1)
BUN SERPL-MCNC: 15 MG/DL (ref 7–24)
CHLORIDE SERPL-SCNC: 105 MMOL/L (ref 98–107)
CHOLEST SERPL-MCNC: 177 MG/DL (ref ?–200)
CREAT SERPL-MCNC: 0.78 MG/DL (ref 0.55–1.02)
EOSINOPHIL # BLD AUTO: 0.6 10*3/UL (ref 0–0.4)
EOSINOPHIL # BLD AUTO: 10.3 % (ref 1–4)
ERYTHROCYTE [DISTWIDTH] IN BLOOD BY AUTOMATED COUNT: 12.3 % (ref 0–14.5)
HCT VFR BLD AUTO: 38.6 % (ref 37–47)
HDLC SERPL-MCNC: 101 MG/DL (ref 40–60)
HGB BLD-MCNC: 12.9 G/DL (ref 12–16)
INR BLD: 0.9 (ref 2–3.5)
LDLC SERPL DIRECT ASSAY-MCNC: 65 MG/DL (ref 9–159)
LYMPHOCYTES # BLD AUTO: 3 10*3/UL (ref 1.3–4.4)
LYMPHOCYTES NFR BLD AUTO: 48.7 % (ref 27–41)
MCH RBC QN AUTO: 32.3 PG (ref 27–31)
MCHC RBC AUTO-ENTMCNC: 33.4 G/DL (ref 33–37)
MCV RBC AUTO: 96.7 FL (ref 81–99)
MONOCYTES # BLD AUTO: 0.6 10*3/UL (ref 0.1–1)
MONOCYTES NFR BLD MANUAL: 10 % (ref 3–9)
NEUT #: 1.9 10*3/UL (ref 2.3–7.9)
NEUT %: 30.1 % (ref 47–73)
NRBC BLD QL AUTO: 0 % (ref 0–0)
PHOSPHATE SERPL-MCNC: 4 MG/DL (ref 2.5–4.9)
PLATELET # BLD AUTO: 180 10*3/UL (ref 130–400)
PMV BLD AUTO: 10.5 FL (ref 9.6–12.3)
POTASSIUM SERPL-SCNC: 4.4 MMOL/L (ref 3.5–5.1)
PROT SERPL-MCNC: 6.9 GM/DL (ref 6.4–8.2)
RBC # BLD AUTO: 3.99 10*6/UL (ref 4.1–5.1)
SODIUM SERPL-SCNC: 139 MMOL/L (ref 136–145)
TRIGL SERPL-MCNC: 55 MG/DL (ref ?–150)
TSH SERPL DL<=0.005 MIU/L-ACNC: 7.49 UIU/ML (ref 0.36–4.75)
VITAMIN B12: 431 PG/ML (ref 247–911)
VLDLC SERPL CALC-MCNC: 11 MG/DL (ref 6–40)
WBC NRBC COR # BLD AUTO: 6.1 10*3/UL (ref 4.8–10.8)

## 2018-02-02 ENCOUNTER — HOSPITAL ENCOUNTER (INPATIENT)
Dept: HOSPITAL 83 - ED | Age: 48
LOS: 1 days | Discharge: HOME | DRG: 438 | End: 2018-02-03
Attending: INTERNAL MEDICINE | Admitting: INTERNAL MEDICINE
Payer: MEDICAID

## 2018-02-02 VITALS — HEIGHT: 67 IN | BODY MASS INDEX: 22.93 KG/M2 | WEIGHT: 146.13 LBS

## 2018-02-02 VITALS — DIASTOLIC BLOOD PRESSURE: 86 MMHG

## 2018-02-02 VITALS — SYSTOLIC BLOOD PRESSURE: 135 MMHG | DIASTOLIC BLOOD PRESSURE: 93 MMHG

## 2018-02-02 VITALS — DIASTOLIC BLOOD PRESSURE: 81 MMHG | SYSTOLIC BLOOD PRESSURE: 118 MMHG

## 2018-02-02 VITALS — DIASTOLIC BLOOD PRESSURE: 63 MMHG

## 2018-02-02 VITALS — SYSTOLIC BLOOD PRESSURE: 120 MMHG | DIASTOLIC BLOOD PRESSURE: 86 MMHG

## 2018-02-02 DIAGNOSIS — F31.60: ICD-10-CM

## 2018-02-02 DIAGNOSIS — F99: ICD-10-CM

## 2018-02-02 DIAGNOSIS — Z79.899: ICD-10-CM

## 2018-02-02 DIAGNOSIS — R79.82: ICD-10-CM

## 2018-02-02 DIAGNOSIS — Z72.89: ICD-10-CM

## 2018-02-02 DIAGNOSIS — R74.8: ICD-10-CM

## 2018-02-02 DIAGNOSIS — R30.0: ICD-10-CM

## 2018-02-02 DIAGNOSIS — Z88.6: ICD-10-CM

## 2018-02-02 DIAGNOSIS — K85.90: Primary | ICD-10-CM

## 2018-02-02 DIAGNOSIS — G93.1: ICD-10-CM

## 2018-02-02 DIAGNOSIS — R07.89: ICD-10-CM

## 2018-02-02 DIAGNOSIS — J18.9: ICD-10-CM

## 2018-02-02 DIAGNOSIS — K21.9: ICD-10-CM

## 2018-02-02 DIAGNOSIS — Z86.73: ICD-10-CM

## 2018-02-02 DIAGNOSIS — Z72.0: ICD-10-CM

## 2018-02-02 DIAGNOSIS — Z82.49: ICD-10-CM

## 2018-02-02 DIAGNOSIS — R45.851: ICD-10-CM

## 2018-02-02 DIAGNOSIS — Z71.6: ICD-10-CM

## 2018-02-02 LAB
ALBUMIN SERPL-MCNC: 3.4 GM/DL (ref 3.1–4.5)
ALP SERPL-CCNC: 81 U/L (ref 45–117)
ALT SERPL W P-5'-P-CCNC: 62 U/L (ref 12–78)
APPEARANCE UR: (no result)
APTT PPP: 26.5 SECONDS (ref 20.8–31.5)
AST SERPL-CCNC: 34 IU/L (ref 3–35)
BASOPHILS # BLD AUTO: 0.1 10*3/UL (ref 0–0.1)
BASOPHILS NFR BLD AUTO: 0.6 % (ref 0–1)
BILIRUB UR QL STRIP: NEGATIVE
BUN SERPL-MCNC: 26 MG/DL (ref 7–24)
CHLORIDE SERPL-SCNC: 106 MMOL/L (ref 98–107)
COLOR UR: YELLOW
CREAT SERPL-MCNC: 0.95 MG/DL (ref 0.55–1.02)
EOSINOPHIL # BLD AUTO: 0.3 10*3/UL (ref 0–0.4)
EOSINOPHIL # BLD AUTO: 3.8 % (ref 1–4)
ERYTHROCYTE [DISTWIDTH] IN BLOOD BY AUTOMATED COUNT: 12.4 % (ref 0–14.5)
GLUCOSE UR QL: NEGATIVE
HCT VFR BLD AUTO: 38.8 % (ref 37–47)
HGB BLD-MCNC: 12.7 G/DL (ref 12–16)
HGB UR QL STRIP: NEGATIVE
INR BLD: 1 (ref 2–3.5)
KETONES UR QL STRIP: NEGATIVE
LEUKOCYTE ESTERASE UR QL STRIP: NEGATIVE
LIPASE SERPL-CCNC: 1230 U/L (ref 73–393)
LYMPHOCYTES # BLD AUTO: 2.3 10*3/UL (ref 1.3–4.4)
LYMPHOCYTES NFR BLD AUTO: 28.8 % (ref 27–41)
MCH RBC QN AUTO: 31.8 PG (ref 27–31)
MCHC RBC AUTO-ENTMCNC: 32.7 G/DL (ref 33–37)
MCV RBC AUTO: 97.2 FL (ref 81–99)
MONOCYTES # BLD AUTO: 0.5 10*3/UL (ref 0.1–1)
MONOCYTES NFR BLD MANUAL: 6.1 % (ref 3–9)
NEUT #: 4.8 10*3/UL (ref 2.3–7.9)
NEUT %: 60.4 % (ref 47–73)
NITRITE UR QL STRIP: NEGATIVE
NRBC BLD QL AUTO: 0 % (ref 0–0)
PH UR STRIP: 5.5 [PH] (ref 5–9)
PLATELET # BLD AUTO: 253 10*3/UL (ref 130–400)
PMV BLD AUTO: 10.1 FL (ref 9.6–12.3)
POTASSIUM SERPL-SCNC: 4.8 MMOL/L (ref 3.5–5.1)
PROT SERPL-MCNC: 6.9 GM/DL (ref 6.4–8.2)
RBC # BLD AUTO: 3.99 10*6/UL (ref 4.1–5.1)
RBC #/AREA URNS HPF: (no result) RBC/HPF (ref 0–2)
SODIUM SERPL-SCNC: 140 MMOL/L (ref 136–145)
SP GR UR: 1.01 (ref 1–1.03)
TROPONIN I SERPL-MCNC: < 0.015 NG/ML (ref ?–0.04)
UROBILINOGEN UR STRIP-MCNC: 0.2 E.U./DL (ref 0.2–1)
WBC #/AREA URNS HPF: (no result) WBC/HPF (ref 0–5)
WBC NRBC COR # BLD AUTO: 7.9 10*3/UL (ref 4.8–10.8)
YEAST #/AREA URNS HPF: (no result) /[HPF]

## 2018-02-03 VITALS — DIASTOLIC BLOOD PRESSURE: 90 MMHG | SYSTOLIC BLOOD PRESSURE: 159 MMHG

## 2018-02-03 VITALS — SYSTOLIC BLOOD PRESSURE: 101 MMHG | DIASTOLIC BLOOD PRESSURE: 63 MMHG

## 2018-02-03 VITALS — DIASTOLIC BLOOD PRESSURE: 81 MMHG | SYSTOLIC BLOOD PRESSURE: 117 MMHG

## 2018-02-03 VITALS — DIASTOLIC BLOOD PRESSURE: 59 MMHG

## 2018-02-03 LAB
BASOPHILS # BLD AUTO: 0.1 10*3/UL (ref 0–0.1)
BASOPHILS NFR BLD AUTO: 0.7 % (ref 0–1)
BUN SERPL-MCNC: 22 MG/DL (ref 7–24)
CHLORIDE SERPL-SCNC: 106 MMOL/L (ref 98–107)
CREAT SERPL-MCNC: 0.72 MG/DL (ref 0.55–1.02)
EOSINOPHIL # BLD AUTO: 0.4 10*3/UL (ref 0–0.4)
EOSINOPHIL # BLD AUTO: 5.1 % (ref 1–4)
ERYTHROCYTE [DISTWIDTH] IN BLOOD BY AUTOMATED COUNT: 12.5 % (ref 0–14.5)
HCT VFR BLD AUTO: 36.2 % (ref 37–47)
HGB BLD-MCNC: 12 G/DL (ref 12–16)
LYMPHOCYTES # BLD AUTO: 3.3 10*3/UL (ref 1.3–4.4)
LYMPHOCYTES NFR BLD AUTO: 47 % (ref 27–41)
MCH RBC QN AUTO: 31.8 PG (ref 27–31)
MCHC RBC AUTO-ENTMCNC: 33.1 G/DL (ref 33–37)
MCV RBC AUTO: 96 FL (ref 81–99)
MONOCYTES # BLD AUTO: 0.5 10*3/UL (ref 0.1–1)
MONOCYTES NFR BLD MANUAL: 7.7 % (ref 3–9)
NEUT #: 2.8 10*3/UL (ref 2.3–7.9)
NEUT %: 39.2 % (ref 47–73)
NRBC BLD QL AUTO: 0 % (ref 0–0)
PHOSPHATE SERPL-MCNC: 4.5 MG/DL (ref 2.5–4.9)
PLATELET # BLD AUTO: 234 10*3/UL (ref 130–400)
PMV BLD AUTO: 10.6 FL (ref 9.6–12.3)
POTASSIUM SERPL-SCNC: 4.1 MMOL/L (ref 3.5–5.1)
RBC # BLD AUTO: 3.77 10*6/UL (ref 4.1–5.1)
SODIUM SERPL-SCNC: 141 MMOL/L (ref 136–145)
TSH SERPL DL<=0.005 MIU/L-ACNC: 2.94 UIU/ML (ref 0.36–4.75)
WBC NRBC COR # BLD AUTO: 7 10*3/UL (ref 4.8–10.8)

## 2018-06-16 ENCOUNTER — HOSPITAL ENCOUNTER (EMERGENCY)
Dept: HOSPITAL 83 - ED | Age: 48
Discharge: HOME | End: 2018-06-16
Payer: MEDICAID

## 2018-06-16 VITALS — WEIGHT: 160 LBS | HEIGHT: 55 IN

## 2018-06-16 VITALS — DIASTOLIC BLOOD PRESSURE: 80 MMHG

## 2018-06-16 DIAGNOSIS — Z88.6: ICD-10-CM

## 2018-06-16 DIAGNOSIS — F32.9: ICD-10-CM

## 2018-06-16 DIAGNOSIS — Z79.899: ICD-10-CM

## 2018-06-16 DIAGNOSIS — Z86.73: ICD-10-CM

## 2018-06-16 DIAGNOSIS — Z63.9: Primary | ICD-10-CM

## 2018-06-16 DIAGNOSIS — G89.29: ICD-10-CM

## 2018-06-16 DIAGNOSIS — K21.9: ICD-10-CM

## 2018-06-16 DIAGNOSIS — F17.200: ICD-10-CM

## 2018-06-16 LAB
AMPHETAMINES UR QL SCN: < 1000
APAP SERPL-MCNC: < 2 UG/ML (ref 10–30)
APPEARANCE UR: (no result)
BACTERIA #/AREA URNS HPF: (no result) /[HPF]
BARBITURATES UR QL SCN: < 200
BASOPHILS # BLD AUTO: 0.1 10*3/UL (ref 0–0.1)
BASOPHILS NFR BLD AUTO: 0.6 % (ref 0–1)
BENZODIAZ UR QL SCN: < 200
BILIRUB UR QL STRIP: NEGATIVE
BUN SERPL-MCNC: 18 MG/DL (ref 7–24)
BZE UR QL SCN: > 300
CANNABINOIDS UR QL SCN: > 50
CHLORIDE SERPL-SCNC: 107 MMOL/L (ref 98–107)
COLOR UR: YELLOW
CREAT SERPL-MCNC: 0.95 MG/DL (ref 0.55–1.02)
EOSINOPHIL # BLD AUTO: 0.5 10*3/UL (ref 0–0.4)
EOSINOPHIL # BLD AUTO: 5.2 % (ref 1–4)
EPI CELLS #/AREA URNS HPF: (no result) /[HPF]
ERYTHROCYTE [DISTWIDTH] IN BLOOD BY AUTOMATED COUNT: 12.6 % (ref 0–14.5)
ETHANOL SERPL-MCNC: < 3 MG/DL (ref ?–3)
GLUCOSE UR QL: NEGATIVE
HCT VFR BLD AUTO: 41.3 % (ref 37–47)
HGB BLD-MCNC: 13.7 G/DL (ref 12–16)
HGB UR QL STRIP: (no result)
KETONES UR QL STRIP: NEGATIVE
LEUKOCYTE ESTERASE UR QL STRIP: NEGATIVE
LYMPHOCYTES # BLD AUTO: 3.3 10*3/UL (ref 1.3–4.4)
LYMPHOCYTES NFR BLD AUTO: 35.4 % (ref 27–41)
MCH RBC QN AUTO: 31.5 PG (ref 27–31)
MCHC RBC AUTO-ENTMCNC: 33.2 G/DL (ref 33–37)
MCV RBC AUTO: 94.9 FL (ref 81–99)
METHADONE UR QL SCN: < 300
MONOCYTES # BLD AUTO: 0.7 10*3/UL (ref 0.1–1)
MONOCYTES NFR BLD MANUAL: 7 % (ref 3–9)
NEUT #: 4.8 10*3/UL (ref 2.3–7.9)
NEUT %: 51.6 % (ref 47–73)
NITRITE UR QL STRIP: NEGATIVE
NRBC BLD QL AUTO: 0 10*3/UL (ref 0–0)
OPIATES UR QL SCN: < 300
PCP UR QL SCN: <  25
PH UR STRIP: 6 [PH] (ref 5–9)
PLATELET # BLD AUTO: 226 10*3/UL (ref 130–400)
PMV BLD AUTO: 10.4 FL (ref 9.6–12.3)
POTASSIUM SERPL-SCNC: 3.8 MMOL/L (ref 3.5–5.1)
RBC # BLD AUTO: 4.35 10*6/UL (ref 4.1–5.1)
RBC #/AREA URNS HPF: (no result) RBC/HPF (ref 0–2)
SODIUM SERPL-SCNC: 143 MMOL/L (ref 136–145)
SP GR UR: 1.02 (ref 1–1.03)
UROBILINOGEN UR STRIP-MCNC: 0.2 E.U./DL (ref 0.2–1)
WBC #/AREA URNS HPF: (no result) WBC/HPF (ref 0–5)
WBC NRBC COR # BLD AUTO: 9.3 10*3/UL (ref 4.8–10.8)

## 2018-06-16 SDOH — SOCIAL STABILITY - SOCIAL INSECURITY: PROBLEM RELATED TO PRIMARY SUPPORT GROUP, UNSPECIFIED: Z63.9

## 2018-09-06 ENCOUNTER — HOSPITAL ENCOUNTER (OUTPATIENT)
Dept: HOSPITAL 83 - MAMMO | Age: 48
Discharge: HOME | End: 2018-09-06
Attending: FAMILY MEDICINE
Payer: MEDICAID

## 2018-09-06 DIAGNOSIS — N63.21: ICD-10-CM

## 2018-09-06 DIAGNOSIS — Z12.31: Primary | ICD-10-CM

## 2018-10-16 ENCOUNTER — HOSPITAL ENCOUNTER (OUTPATIENT)
Dept: HOSPITAL 83 - CT | Age: 48
Discharge: HOME | End: 2018-10-16
Attending: UROLOGY
Payer: MEDICAID

## 2018-10-16 DIAGNOSIS — R31.9: ICD-10-CM

## 2018-10-16 DIAGNOSIS — K44.9: Primary | ICD-10-CM

## 2018-10-16 LAB
ALBUMIN SERPL-MCNC: 3.3 GM/DL (ref 3.1–4.5)
ALP SERPL-CCNC: 54 U/L (ref 45–117)
ALT SERPL W P-5'-P-CCNC: 17 U/L (ref 12–78)
AST SERPL-CCNC: 13 IU/L (ref 3–35)
BASOPHILS # BLD AUTO: 0.1 10*3/UL (ref 0–0.1)
BASOPHILS NFR BLD AUTO: 0.7 % (ref 0–1)
BUN SERPL-MCNC: 10 MG/DL (ref 7–24)
CHLORIDE SERPL-SCNC: 104 MMOL/L (ref 98–107)
CREAT SERPL-MCNC: 0.98 MG/DL (ref 0.55–1.02)
EOSINOPHIL # BLD AUTO: 0.4 10*3/UL (ref 0–0.4)
EOSINOPHIL # BLD AUTO: 4.2 % (ref 1–4)
ERYTHROCYTE [DISTWIDTH] IN BLOOD BY AUTOMATED COUNT: 14.1 % (ref 0–14.5)
HCT VFR BLD AUTO: 35.9 % (ref 37–47)
HGB BLD-MCNC: 12 G/DL (ref 12–16)
LYMPHOCYTES # BLD AUTO: 3.4 10*3/UL (ref 1.3–4.4)
LYMPHOCYTES NFR BLD AUTO: 32.1 % (ref 27–41)
MCH RBC QN AUTO: 32.5 PG (ref 27–31)
MCHC RBC AUTO-ENTMCNC: 33.4 G/DL (ref 33–37)
MCV RBC AUTO: 97.3 FL (ref 81–99)
MONOCYTES # BLD AUTO: 0.8 10*3/UL (ref 0.1–1)
MONOCYTES NFR BLD MANUAL: 8 % (ref 3–9)
NEUT #: 5.8 10*3/UL (ref 2.3–7.9)
NEUT %: 54.6 % (ref 47–73)
NRBC BLD QL AUTO: 0 % (ref 0–0)
PLATELET # BLD AUTO: 258 10*3/UL (ref 130–400)
PMV BLD AUTO: 10.4 FL (ref 9.6–12.3)
POTASSIUM SERPL-SCNC: 3.7 MMOL/L (ref 3.5–5.1)
PROT SERPL-MCNC: 7.2 GM/DL (ref 6.4–8.2)
RBC # BLD AUTO: 3.69 10*6/UL (ref 4.1–5.1)
SODIUM SERPL-SCNC: 141 MMOL/L (ref 136–145)
WBC NRBC COR # BLD AUTO: 10.5 10*3/UL (ref 4.8–10.8)

## 2018-10-17 ENCOUNTER — HOSPITAL ENCOUNTER (EMERGENCY)
Dept: HOSPITAL 83 - ED | Age: 48
Discharge: HOME | End: 2018-10-17
Payer: MEDICAID

## 2018-10-17 VITALS — DIASTOLIC BLOOD PRESSURE: 92 MMHG

## 2018-10-17 VITALS — WEIGHT: 150 LBS | HEIGHT: 55 IN

## 2018-10-17 DIAGNOSIS — R07.9: ICD-10-CM

## 2018-10-17 DIAGNOSIS — G62.9: ICD-10-CM

## 2018-10-17 DIAGNOSIS — R42: Primary | ICD-10-CM

## 2018-10-17 DIAGNOSIS — Z88.8: ICD-10-CM

## 2018-10-17 DIAGNOSIS — F17.210: ICD-10-CM
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
## 2018-10-17 DIAGNOSIS — Z79.899: ICD-10-CM
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
## 2018-10-17 DIAGNOSIS — K21.9: ICD-10-CM
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
## 2018-10-17 LAB
ALBUMIN SERPL-MCNC: 3.3 GM/DL (ref 3.1–4.5)
ALP SERPL-CCNC: 54 U/L (ref 45–117)
ALT SERPL W P-5'-P-CCNC: 15 U/L (ref 12–78)
AMPHETAMINES UR QL SCN: < 1000
APPEARANCE UR: (no result)
APTT PPP: 22.5 SECONDS (ref 19.5–32.1)
AST SERPL-CCNC: 13 IU/L (ref 3–35)
BACTERIA #/AREA URNS HPF: (no result) /[HPF]
BARBITURATES UR QL SCN: < 200
BASOPHILS # BLD AUTO: 0.1 10*3/UL (ref 0–0.1)
BASOPHILS NFR BLD AUTO: 0.5 % (ref 0–1)
BENZODIAZ UR QL SCN: < 200
BILIRUB UR QL STRIP: NEGATIVE
BUN SERPL-MCNC: 9 MG/DL (ref 7–24)
BZE UR QL SCN: < 300
CANNABINOIDS UR QL SCN: > 50
CHLORIDE SERPL-SCNC: 108 MMOL/L (ref 98–107)
COLOR UR: YELLOW
CREAT SERPL-MCNC: 0.94 MG/DL (ref 0.55–1.02)
CRYSTALS URNS MICRO: (no result)
EOSINOPHIL # BLD AUTO: 0.3 10*3/UL (ref 0–0.4)
EOSINOPHIL # BLD AUTO: 2.8 % (ref 1–4)
EPI CELLS #/AREA URNS HPF: (no result) /[HPF]
ERYTHROCYTE [DISTWIDTH] IN BLOOD BY AUTOMATED COUNT: 14.4 % (ref 0–14.5)
ETHANOL SERPL-MCNC: < 3 MG/DL (ref ?–3)
GLUCOSE UR QL: NEGATIVE
HCT VFR BLD AUTO: 33.7 % (ref 37–47)
HGB BLD-MCNC: 11.2 G/DL (ref 12–16)
HGB UR QL STRIP: NEGATIVE
INR BLD: 0.9 (ref 2–3.5)
KETONES UR QL STRIP: NEGATIVE
LEUKOCYTE ESTERASE UR QL STRIP: NEGATIVE
LYMPHOCYTES # BLD AUTO: 1.8 10*3/UL (ref 1.3–4.4)
LYMPHOCYTES NFR BLD AUTO: 18.1 % (ref 27–41)
MCH RBC QN AUTO: 32.5 PG (ref 27–31)
MCHC RBC AUTO-ENTMCNC: 33.2 G/DL (ref 33–37)
MCV RBC AUTO: 97.7 FL (ref 81–99)
METHADONE UR QL SCN: < 300
MONOCYTES # BLD AUTO: 0.7 10*3/UL (ref 0.1–1)
MONOCYTES NFR BLD MANUAL: 6.8 % (ref 3–9)
NEUT #: 7.2 10*3/UL (ref 2.3–7.9)
NEUT %: 71.5 % (ref 47–73)
NITRITE UR QL STRIP: NEGATIVE
NRBC BLD QL AUTO: 0 10*3/UL (ref 0–0)
OPIATES UR QL SCN: < 300
PCP UR QL SCN: <  25
PH UR STRIP: 6 [PH] (ref 5–9)
PLATELET # BLD AUTO: 236 10*3/UL (ref 130–400)
PMV BLD AUTO: 10.1 FL (ref 9.6–12.3)
POTASSIUM SERPL-SCNC: 4.4 MMOL/L (ref 3.5–5.1)
PROT SERPL-MCNC: 6.6 GM/DL (ref 6.4–8.2)
RBC # BLD AUTO: 3.45 10*6/UL (ref 4.1–5.1)
SODIUM SERPL-SCNC: 142 MMOL/L (ref 136–145)
SP GR UR: 1.01 (ref 1–1.03)
TSH SERPL DL<=0.005 MIU/L-ACNC: 0.94 UIU/ML (ref 0.36–4.75)
UROBILINOGEN UR STRIP-MCNC: 0.2 E.U./DL (ref 0.2–1)
WBC #/AREA URNS HPF: (no result) WBC/HPF (ref 0–5)
WBC NRBC COR # BLD AUTO: 10 10*3/UL (ref 4.8–10.8)

## 2019-05-18 ENCOUNTER — HOSPITAL ENCOUNTER (EMERGENCY)
Dept: HOSPITAL 83 - ED | Age: 49
Discharge: HOME | End: 2019-05-18
Payer: MEDICAID

## 2019-05-18 VITALS
SYSTOLIC BLOOD PRESSURE: 136 MMHG | WEIGHT: 125 LBS | BODY MASS INDEX: 19.62 KG/M2 | HEIGHT: 66.97 IN | DIASTOLIC BLOOD PRESSURE: 70 MMHG

## 2019-05-18 DIAGNOSIS — Z88.8: ICD-10-CM

## 2019-05-18 DIAGNOSIS — Z88.6: ICD-10-CM

## 2019-05-18 DIAGNOSIS — F14.90: ICD-10-CM

## 2019-05-18 DIAGNOSIS — Z79.899: ICD-10-CM

## 2019-05-18 DIAGNOSIS — F12.90: ICD-10-CM

## 2019-05-18 DIAGNOSIS — G62.9: ICD-10-CM

## 2019-05-18 DIAGNOSIS — I10: ICD-10-CM

## 2019-05-18 DIAGNOSIS — K21.9: ICD-10-CM

## 2019-05-18 DIAGNOSIS — F31.9: Primary | ICD-10-CM

## 2019-05-18 DIAGNOSIS — F17.200: ICD-10-CM

## 2019-05-18 DIAGNOSIS — E78.5: ICD-10-CM

## 2019-06-26 ENCOUNTER — HOSPITAL ENCOUNTER (EMERGENCY)
Age: 49
Discharge: HOME | End: 2019-06-27
Attending: EMERGENCY MEDICINE
Payer: MEDICAID

## 2019-06-26 DIAGNOSIS — K80.20 GALLSTONES: ICD-10-CM

## 2019-06-26 DIAGNOSIS — R11.2 NAUSEA AND VOMITING, INTRACTABILITY OF VOMITING NOT SPECIFIED, UNSPECIFIED VOMITING TYPE: ICD-10-CM

## 2019-06-26 DIAGNOSIS — L03.211 FACIAL CELLULITIS: ICD-10-CM

## 2019-06-26 DIAGNOSIS — R21 RASH AND OTHER NONSPECIFIC SKIN ERUPTION: Primary | ICD-10-CM

## 2019-06-26 PROCEDURE — 99284 EMERGENCY DEPT VISIT MOD MDM: CPT

## 2019-06-26 RX ORDER — 0.9 % SODIUM CHLORIDE 0.9 %
1000 INTRAVENOUS SOLUTION INTRAVENOUS ONCE
Status: DISCONTINUED | OUTPATIENT
Start: 2019-06-26 | End: 2019-06-27 | Stop reason: HOSPADM

## 2019-06-26 ASSESSMENT — PAIN DESCRIPTION - LOCATION: LOCATION: FACE

## 2019-06-27 ENCOUNTER — APPOINTMENT (OUTPATIENT)
Dept: CT IMAGING | Age: 49
End: 2019-06-27
Payer: MEDICAID

## 2019-06-27 VITALS
HEIGHT: 64 IN | SYSTOLIC BLOOD PRESSURE: 152 MMHG | BODY MASS INDEX: 22.2 KG/M2 | OXYGEN SATURATION: 98 % | RESPIRATION RATE: 12 BRPM | DIASTOLIC BLOOD PRESSURE: 83 MMHG | HEART RATE: 77 BPM | WEIGHT: 130 LBS | TEMPERATURE: 96.7 F

## 2019-06-27 LAB
ALBUMIN SERPL-MCNC: 4.7 G/DL (ref 3.5–5.2)
ALP BLD-CCNC: 62 U/L (ref 35–104)
ALT SERPL-CCNC: 9 U/L (ref 0–32)
ANION GAP SERPL CALCULATED.3IONS-SCNC: 12 MMOL/L (ref 7–16)
AST SERPL-CCNC: 17 U/L (ref 0–31)
BILIRUB SERPL-MCNC: 0.3 MG/DL (ref 0–1.2)
BILIRUBIN URINE: NEGATIVE
BLOOD, URINE: NEGATIVE
BUN BLDV-MCNC: 20 MG/DL (ref 6–20)
CALCIUM SERPL-MCNC: 9.7 MG/DL (ref 8.6–10.2)
CHLORIDE BLD-SCNC: 102 MMOL/L (ref 98–107)
CLARITY: CLEAR
CO2: 29 MMOL/L (ref 22–29)
COLOR: YELLOW
CREAT SERPL-MCNC: 0.9 MG/DL (ref 0.5–1)
GFR AFRICAN AMERICAN: >60
GFR NON-AFRICAN AMERICAN: >60 ML/MIN/1.73
GLUCOSE BLD-MCNC: 110 MG/DL (ref 74–99)
GLUCOSE URINE: NEGATIVE MG/DL
HCT VFR BLD CALC: 38.7 % (ref 34–48)
HEMOGLOBIN: 12.9 G/DL (ref 11.5–15.5)
KETONES, URINE: ABNORMAL MG/DL
LACTIC ACID: 1.5 MMOL/L (ref 0.5–2.2)
LEUKOCYTE ESTERASE, URINE: NEGATIVE
LIPASE: 22 U/L (ref 13–60)
MCH RBC QN AUTO: 30.3 PG (ref 26–35)
MCHC RBC AUTO-ENTMCNC: 33.3 % (ref 32–34.5)
MCV RBC AUTO: 90.8 FL (ref 80–99.9)
NITRITE, URINE: NEGATIVE
PDW BLD-RTO: 15.7 FL (ref 11.5–15)
PH UA: 5.5 (ref 5–9)
PLATELET # BLD: 307 E9/L (ref 130–450)
PMV BLD AUTO: 10.9 FL (ref 7–12)
POTASSIUM SERPL-SCNC: 4.1 MMOL/L (ref 3.5–5)
PROTEIN UA: NEGATIVE MG/DL
RBC # BLD: 4.26 E12/L (ref 3.5–5.5)
SODIUM BLD-SCNC: 143 MMOL/L (ref 132–146)
SPECIFIC GRAVITY UA: 1.01 (ref 1–1.03)
TOTAL PROTEIN: 7.6 G/DL (ref 6.4–8.3)
UROBILINOGEN, URINE: 0.2 E.U./DL
WBC # BLD: 10 E9/L (ref 4.5–11.5)

## 2019-06-27 PROCEDURE — 85027 COMPLETE CBC AUTOMATED: CPT

## 2019-06-27 PROCEDURE — 81003 URINALYSIS AUTO W/O SCOPE: CPT

## 2019-06-27 PROCEDURE — 83690 ASSAY OF LIPASE: CPT

## 2019-06-27 PROCEDURE — 83605 ASSAY OF LACTIC ACID: CPT

## 2019-06-27 PROCEDURE — 6370000000 HC RX 637 (ALT 250 FOR IP): Performed by: EMERGENCY MEDICINE

## 2019-06-27 PROCEDURE — 80053 COMPREHEN METABOLIC PANEL: CPT

## 2019-06-27 PROCEDURE — 74176 CT ABD & PELVIS W/O CONTRAST: CPT

## 2019-06-27 RX ORDER — CEPHALEXIN 500 MG/1
500 CAPSULE ORAL 4 TIMES DAILY
Qty: 40 CAPSULE | Refills: 0 | Status: SHIPPED | OUTPATIENT
Start: 2019-06-27 | End: 2019-07-07

## 2019-06-27 RX ORDER — CLINDAMYCIN HYDROCHLORIDE 300 MG/1
300 CAPSULE ORAL 3 TIMES DAILY
Qty: 30 CAPSULE | Refills: 0 | Status: SHIPPED | OUTPATIENT
Start: 2019-06-27 | End: 2019-07-07

## 2019-06-27 RX ORDER — CLINDAMYCIN HYDROCHLORIDE 150 MG/1
300 CAPSULE ORAL ONCE
Status: COMPLETED | OUTPATIENT
Start: 2019-06-27 | End: 2019-06-27

## 2019-06-27 RX ADMIN — CLINDAMYCIN HYDROCHLORIDE 300 MG: 150 CAPSULE ORAL at 03:58

## 2019-06-27 NOTE — ED PROVIDER NOTES
Department of Emergency Medicine   ED  Provider Note  Admit Date/RoomTime: 6/26/2019 11:05 PM  ED Room: [facility]/14B-14     HPI:   Selina Garcia is a 46 y.o. female presenting to the ED for emesis, beginning today. The complaint has been persistent, mild in severity, and worsened by nothing. Pt presents to the ED due to emesis that began today. Pt states she hasn't been able to eat anything all day, complains of 5 episodes of emesis. Pt denies abd pain, chest pain, diarrhea, SOB. She notes bilateral foot pain but confirms it is chronic. Pt also exhibits a rash to the left upper cheek and states it presented today, confirms itchiness. Patient denies fever/chills, cough, congestion, edema, headache, nausea, constipation, hematochezia, melena, incontinence, dysuria, hematuria, trauma, neck or back pain or other complaints. ROS:   Pertinent positives and negatives are stated within HPI, all other systems reviewed and are negative.    ---------------------------------------- PAST HISTORY -------------------------------------------  Past Medical History:  has a past medical history of Acute kidney failure with tubular necrosis ([facility] [address] Anoxic brain damage ([facility] Bipolar 1 disorder ([address] Cardiac arrest ([address] Cerebral artery occlusion with cerebral infarction ([address] Cocaine abuse ([address] Dementia, Depression, GERD (gastroesophageal reflux disease), Hyperlipidemia, Hypertension, Iron deficiency anemia, and Tachycardia. Past Surgical History:  has no past surgical history on file. Social History:  reports that she has been smoking. She has been smoking about 1.00 pack per day. She has never used smokeless tobacco. She reports that she does not drink alcohol or use drugs. Family History: family history is not on file. The patients home medications have been reviewed.     Allergies: Aripiprazole and Aspirin    ------------------------------------------- RESULTS Affect    ------------------------- ED COURSE/MEDICAL DECISION MAKING----------------------  Medications   0.9 % sodium chloride bolus (has no administration in time range)   clindamycin (CLEOCIN) capsule 300 mg (300 mg Oral Given 6/27/19 0358)       Medical Decision Making:        Re-Evaluation:    Re-evaluation. Patients symptoms are improved and patient having no tenderness on exam abdomen is soft and nontender. Patient has no rebound or guarding. There is still the redness noted to left side of face it has not spread. Patient nontoxic-appearing and we will attempt to place on oral antibiotics and outpatient follow-up      Consultation:      Counseling: The emergency provider has spoken with the patient and discussed todays results, in addition to providing specific details for the plan of care and counseling regarding the diagnosis and prognosis. Questions are answered at this time and they are agreeable with the plan.      ---------------------------- IMPRESSION AND DISPOSITION -------------------------------    IMPRESSION  1. Rash and other nonspecific skin eruption    2. Nausea and vomiting, intractability of vomiting not specified, unspecified vomiting type    3. Gallstones    4. Facial cellulitis        DISPOSITION  Disposition: disCharge home  Patient condition is stable    6/26/19, 11:10 PM.    This note is prepared by Ruba Lehman, acting as Scribe for Benjamín Nagel MD.    Benjamín Nagel MD:  The scribe's documentation has been prepared under my direction and personally reviewed by me in its entirety. I confirm that the note above accurately reflects all work, treatment, procedures, and medical decision making performed by me.        Benjamín Nagel MD  06/27/19 0028       Benjamín Nagel MD  06/27/19 6764

## 2019-06-27 NOTE — ED NOTES
Bed: 14B-14  Expected date:   Expected time:   Means of arrival:   Comments:  ems     Simone Antoine RN  06/26/19 5978

## 2019-06-27 NOTE — ED NOTES
Called [facility], was told they would be here in about an hour.      Juanjo Wagner RN  06/27/19 9199

## 2019-08-21 ENCOUNTER — HOSPITAL ENCOUNTER (OUTPATIENT)
Age: 49
Discharge: HOME | End: 2019-08-23
Payer: MEDICAID

## 2019-08-21 PROCEDURE — 87088 URINE BACTERIA CULTURE: CPT

## 2019-08-23 LAB — URINE CULTURE, ROUTINE: NORMAL

## 2019-09-24 ENCOUNTER — HOSPITAL ENCOUNTER (EMERGENCY)
Age: 49
Discharge: PSYCHIATRIC OR SUBSTANCE USE FACILITY | End: 2019-09-25
Attending: EMERGENCY MEDICINE
Payer: MEDICAID

## 2019-09-24 DIAGNOSIS — R45.851 SUICIDAL IDEATION: Primary | ICD-10-CM

## 2019-09-24 DIAGNOSIS — F39 MOOD DISORDER (HCC): ICD-10-CM

## 2019-09-24 LAB
ACETAMINOPHEN LEVEL: <5 MCG/ML (ref 10–30)
ALBUMIN SERPL-MCNC: 4.7 G/DL (ref 3.5–5.2)
ALP BLD-CCNC: 84 U/L (ref 35–104)
ALT SERPL-CCNC: 15 U/L (ref 0–32)
AMPHETAMINE SCREEN, URINE: NOT DETECTED
ANION GAP SERPL CALCULATED.3IONS-SCNC: 10 MMOL/L (ref 7–16)
AST SERPL-CCNC: 19 U/L (ref 0–31)
BARBITURATE SCREEN URINE: NOT DETECTED
BENZODIAZEPINE SCREEN, URINE: NOT DETECTED
BILIRUB SERPL-MCNC: <0.2 MG/DL (ref 0–1.2)
BUN BLDV-MCNC: 17 MG/DL (ref 6–20)
CALCIUM SERPL-MCNC: 9.8 MG/DL (ref 8.6–10.2)
CANNABINOID SCREEN URINE: NOT DETECTED
CHLORIDE BLD-SCNC: 100 MMOL/L (ref 98–107)
CO2: 29 MMOL/L (ref 22–29)
COCAINE METABOLITE SCREEN URINE: NOT DETECTED
CREAT SERPL-MCNC: 1.1 MG/DL (ref 0.5–1)
ETHANOL: <10 MG/DL (ref 0–0.08)
GFR AFRICAN AMERICAN: >60
GFR NON-AFRICAN AMERICAN: 52 ML/MIN/1.73
GLUCOSE BLD-MCNC: 97 MG/DL (ref 74–99)
HCT VFR BLD CALC: 41.2 % (ref 34–48)
HEMOGLOBIN: 13.5 G/DL (ref 11.5–15.5)
Lab: NORMAL
MCH RBC QN AUTO: 31.1 PG (ref 26–35)
MCHC RBC AUTO-ENTMCNC: 32.8 % (ref 32–34.5)
MCV RBC AUTO: 94.9 FL (ref 80–99.9)
METHADONE SCREEN, URINE: NOT DETECTED
OPIATE SCREEN URINE: NOT DETECTED
PDW BLD-RTO: 13.2 FL (ref 11.5–15)
PHENCYCLIDINE SCREEN URINE: NOT DETECTED
PLATELET # BLD: 250 E9/L (ref 130–450)
PMV BLD AUTO: 10.1 FL (ref 7–12)
POTASSIUM SERPL-SCNC: 3.6 MMOL/L (ref 3.5–5)
PROPOXYPHENE SCREEN: NOT DETECTED
RBC # BLD: 4.34 E12/L (ref 3.5–5.5)
SALICYLATE, SERUM: <0.3 MG/DL (ref 0–30)
SODIUM BLD-SCNC: 139 MMOL/L (ref 132–146)
TOTAL PROTEIN: 7.7 G/DL (ref 6.4–8.3)
TRICYCLIC ANTIDEPRESSANTS SCREEN SERUM: NEGATIVE NG/ML
WBC # BLD: 8.9 E9/L (ref 4.5–11.5)

## 2019-09-24 PROCEDURE — 36415 COLL VENOUS BLD VENIPUNCTURE: CPT

## 2019-09-24 PROCEDURE — 85027 COMPLETE CBC AUTOMATED: CPT

## 2019-09-24 PROCEDURE — 6370000000 HC RX 637 (ALT 250 FOR IP): Performed by: EMERGENCY MEDICINE

## 2019-09-24 PROCEDURE — 99285 EMERGENCY DEPT VISIT HI MDM: CPT

## 2019-09-24 PROCEDURE — 80053 COMPREHEN METABOLIC PANEL: CPT

## 2019-09-24 PROCEDURE — G0480 DRUG TEST DEF 1-7 CLASSES: HCPCS

## 2019-09-24 PROCEDURE — 80307 DRUG TEST PRSMV CHEM ANLYZR: CPT

## 2019-09-24 RX ORDER — ACETAMINOPHEN 325 MG/1
650 TABLET ORAL ONCE
Status: COMPLETED | OUTPATIENT
Start: 2019-09-25 | End: 2019-09-24

## 2019-09-24 RX ADMIN — ACETAMINOPHEN 650 MG: 325 TABLET, FILM COATED ORAL at 23:54

## 2019-09-24 RX ADMIN — NICOTINE POLACRILEX 2 MG: 2 GUM, CHEWING BUCCAL at 23:50

## 2019-09-24 ASSESSMENT — PAIN SCALES - GENERAL: PAINLEVEL_OUTOF10: 8

## 2019-09-25 VITALS
BODY MASS INDEX: 20.61 KG/M2 | TEMPERATURE: 96.8 F | RESPIRATION RATE: 16 BRPM | OXYGEN SATURATION: 98 % | HEART RATE: 76 BPM | DIASTOLIC BLOOD PRESSURE: 97 MMHG | SYSTOLIC BLOOD PRESSURE: 140 MMHG | HEIGHT: 68 IN | WEIGHT: 136 LBS

## 2019-09-25 PROCEDURE — 6370000000 HC RX 637 (ALT 250 FOR IP): Performed by: EMERGENCY MEDICINE

## 2019-09-25 RX ORDER — LAMOTRIGINE 100 MG/1
100 TABLET ORAL ONCE
Status: COMPLETED | OUTPATIENT
Start: 2019-09-25 | End: 2019-09-25

## 2019-09-25 RX ORDER — FAMOTIDINE 20 MG/1
20 TABLET, FILM COATED ORAL ONCE
Status: COMPLETED | OUTPATIENT
Start: 2019-09-25 | End: 2019-09-25

## 2019-09-25 RX ORDER — CETIRIZINE HYDROCHLORIDE 10 MG/1
10 TABLET ORAL ONCE
Status: COMPLETED | OUTPATIENT
Start: 2019-09-25 | End: 2019-09-25

## 2019-09-25 RX ORDER — SUCRALFATE 1 G/1
1 TABLET ORAL ONCE
Status: COMPLETED | OUTPATIENT
Start: 2019-09-25 | End: 2019-09-25

## 2019-09-25 RX ORDER — RIVASTIGMINE 9.5 MG/24H
1 PATCH, EXTENDED RELEASE TRANSDERMAL ONCE
Status: DISCONTINUED | OUTPATIENT
Start: 2019-09-25 | End: 2019-09-25 | Stop reason: HOSPADM

## 2019-09-25 RX ORDER — GABAPENTIN 100 MG/1
100 CAPSULE ORAL ONCE
Status: COMPLETED | OUTPATIENT
Start: 2019-09-25 | End: 2019-09-25

## 2019-09-25 RX ORDER — AMLODIPINE BESYLATE 5 MG/1
10 TABLET ORAL ONCE
Status: COMPLETED | OUTPATIENT
Start: 2019-09-25 | End: 2019-09-25

## 2019-09-25 RX ORDER — QUETIAPINE FUMARATE 200 MG/1
200 TABLET, FILM COATED ORAL ONCE
Status: COMPLETED | OUTPATIENT
Start: 2019-09-25 | End: 2019-09-25

## 2019-09-25 RX ORDER — FOLIC ACID 1 MG/1
1 TABLET ORAL ONCE
Status: COMPLETED | OUTPATIENT
Start: 2019-09-25 | End: 2019-09-25

## 2019-09-25 RX ORDER — ACETAMINOPHEN 325 MG/1
650 TABLET ORAL EVERY 6 HOURS PRN
Status: DISCONTINUED | OUTPATIENT
Start: 2019-09-25 | End: 2019-09-25 | Stop reason: HOSPADM

## 2019-09-25 RX ORDER — ATENOLOL 25 MG/1
25 TABLET ORAL ONCE
Status: COMPLETED | OUTPATIENT
Start: 2019-09-25 | End: 2019-09-25

## 2019-09-25 RX ORDER — MIRTAZAPINE 15 MG/1
22.5 TABLET, FILM COATED ORAL ONCE
Status: COMPLETED | OUTPATIENT
Start: 2019-09-25 | End: 2019-09-25

## 2019-09-25 RX ORDER — FERROUS SULFATE 325(65) MG
325 TABLET ORAL ONCE
Status: COMPLETED | OUTPATIENT
Start: 2019-09-25 | End: 2019-09-25

## 2019-09-25 RX ADMIN — FOLIC ACID 1 MG: 1 TABLET ORAL at 15:52

## 2019-09-25 RX ADMIN — ACETAMINOPHEN 650 MG: 325 TABLET, FILM COATED ORAL at 15:52

## 2019-09-25 RX ADMIN — FERROUS SULFATE TAB 325 MG (65 MG ELEMENTAL FE) 325 MG: 325 (65 FE) TAB at 15:54

## 2019-09-25 RX ADMIN — SUCRALFATE 1 G: 1 TABLET ORAL at 15:59

## 2019-09-25 RX ADMIN — ATENOLOL 25 MG: 25 TABLET ORAL at 15:54

## 2019-09-25 RX ADMIN — FAMOTIDINE 20 MG: 20 TABLET ORAL at 15:53

## 2019-09-25 RX ADMIN — CETIRIZINE HYDROCHLORIDE 10 MG: 10 TABLET, FILM COATED ORAL at 15:58

## 2019-09-25 RX ADMIN — GABAPENTIN 100 MG: 100 CAPSULE ORAL at 15:53

## 2019-09-25 RX ADMIN — AMLODIPINE BESYLATE 10 MG: 5 TABLET ORAL at 15:54

## 2019-09-25 RX ADMIN — LAMOTRIGINE 100 MG: 100 TABLET ORAL at 15:53

## 2019-09-25 RX ADMIN — MIRTAZAPINE 22.5 MG: 15 TABLET, FILM COATED ORAL at 01:09

## 2019-09-25 RX ADMIN — QUETIAPINE FUMARATE 200 MG: 200 TABLET ORAL at 01:09

## 2019-09-25 ASSESSMENT — PAIN SCALES - GENERAL: PAINLEVEL_OUTOF10: 10

## 2019-09-25 ASSESSMENT — PATIENT HEALTH QUESTIONNAIRE - PHQ9: SUM OF ALL RESPONSES TO PHQ QUESTIONS 1-9: 8

## 2019-09-25 NOTE — ED NOTES
Pt receives phone call from a Logan whom states she is a friend when pt gets on phone begins crying and complaining she is in pain with a foot and that no one will listen to her at no time has this pt informed this rn of any pain nor any request. Will address pts complaints when she is off the phone     Daniela Gutierrez RN  09/25/19 3948

## 2019-09-25 NOTE — ED NOTES
After pt learned when tx hear to get her for [facility] that she not going upstairs to be with other rsident whom was admitted she becomes upset and states \"no one will know where I am\" then repeatedly yelling  \"I Luis Fernando Jad Die\" d/t pt trying to disrupt the milue pt 's tx took pt out to ambulance as we are waiting for belongings to be found.      Diego Holliday RN  09/25/19 1938

## 2019-09-25 NOTE — ED NOTES
RECEIVED CALL FROM [facility]    [facility] TO TRANSPORT PT TO [facility] 18:30    PACKET PREPARED AND IN [facility] OF DIANE BEN[place]TE[place], [place]  09/25/19 3262

## 2020-01-15 ENCOUNTER — HOSPITAL ENCOUNTER (OUTPATIENT)
Age: 50
Discharge: HOME | End: 2020-01-17
Payer: MEDICAID

## 2020-01-15 ENCOUNTER — OFFICE VISIT (OUTPATIENT)
Dept: OBGYN | Age: 50
End: 2020-01-15
Payer: MEDICAID

## 2020-01-15 VITALS
BODY MASS INDEX: 23.26 KG/M2 | SYSTOLIC BLOOD PRESSURE: 104 MMHG | WEIGHT: 153 LBS | DIASTOLIC BLOOD PRESSURE: 71 MMHG | HEART RATE: 56 BPM

## 2020-01-15 LAB
BILIRUBIN, POC: NEGATIVE
BLOOD URINE, POC: NEGATIVE
CLARITY, POC: NORMAL
COLOR, POC: YELLOW
GLUCOSE URINE, POC: NEGATIVE
KETONES, POC: NEGATIVE
LEUKOCYTE EST, POC: NEGATIVE
NITRITE, POC: NEGATIVE
PH, POC: 7
PROTEIN, POC: NEGATIVE
SPECIFIC GRAVITY, POC: 1.01
UROBILINOGEN, POC: 0.2

## 2020-01-15 PROCEDURE — 99202 OFFICE O/P NEW SF 15 MIN: CPT | Performed by: NURSE PRACTITIONER

## 2020-01-15 PROCEDURE — 81002 URINALYSIS NONAUTO W/O SCOPE: CPT | Performed by: NURSE PRACTITIONER

## 2020-01-15 PROCEDURE — 87088 URINE BACTERIA CULTURE: CPT

## 2020-01-15 PROCEDURE — 99203 OFFICE O/P NEW LOW 30 MIN: CPT | Performed by: NURSE PRACTITIONER

## 2020-01-15 ASSESSMENT — ENCOUNTER SYMPTOMS: GASTROINTESTINAL NEGATIVE: 1

## 2020-01-16 LAB — URINE CULTURE, ROUTINE: NORMAL

## 2020-02-20 ENCOUNTER — HOSPITAL ENCOUNTER (OUTPATIENT)
Age: 50
Discharge: HOME | End: 2020-02-22
Payer: MEDICAID

## 2020-02-20 PROCEDURE — 88112 CYTOPATH CELL ENHANCE TECH: CPT

## 2020-02-20 PROCEDURE — 87088 URINE BACTERIA CULTURE: CPT

## 2020-02-22 LAB — URINE CULTURE, ROUTINE: NORMAL

## 2020-05-17 ENCOUNTER — HOSPITAL ENCOUNTER (INPATIENT)
Age: 50
LOS: 3 days | Discharge: PSYCHIATRIC OR SUBSTANCE USE FACILITY | DRG: 817 | End: 2020-05-22
Attending: EMERGENCY MEDICINE | Admitting: INTERNAL MEDICINE
Payer: MEDICAID

## 2020-05-17 ENCOUNTER — APPOINTMENT (OUTPATIENT)
Dept: CT IMAGING | Age: 50
DRG: 817 | End: 2020-05-17
Payer: MEDICAID

## 2020-05-17 LAB
ACETAMINOPHEN LEVEL: <5 MCG/ML (ref 10–30)
ALBUMIN SERPL-MCNC: 4 G/DL (ref 3.5–5.2)
ALP BLD-CCNC: 63 U/L (ref 35–104)
ALT SERPL-CCNC: 10 U/L (ref 0–32)
AMPHETAMINE SCREEN, URINE: NOT DETECTED
ANION GAP SERPL CALCULATED.3IONS-SCNC: 15 MMOL/L (ref 7–16)
AST SERPL-CCNC: 15 U/L (ref 0–31)
BARBITURATE SCREEN URINE: NOT DETECTED
BASOPHILS ABSOLUTE: 0.04 E9/L (ref 0–0.2)
BASOPHILS RELATIVE PERCENT: 0.6 % (ref 0–2)
BENZODIAZEPINE SCREEN, URINE: NOT DETECTED
BILIRUB SERPL-MCNC: <0.2 MG/DL (ref 0–1.2)
BUN BLDV-MCNC: 20 MG/DL (ref 6–20)
CALCIUM SERPL-MCNC: 8.9 MG/DL (ref 8.6–10.2)
CANNABINOID SCREEN URINE: NOT DETECTED
CHLORIDE BLD-SCNC: 104 MMOL/L (ref 98–107)
CO2: 20 MMOL/L (ref 22–29)
COCAINE METABOLITE SCREEN URINE: NOT DETECTED
CREAT SERPL-MCNC: 0.9 MG/DL (ref 0.5–1)
EOSINOPHILS ABSOLUTE: 0.29 E9/L (ref 0.05–0.5)
EOSINOPHILS RELATIVE PERCENT: 4.2 % (ref 0–6)
ETHANOL: <10 MG/DL (ref 0–0.08)
FENTANYL SCREEN, URINE: NOT DETECTED
GFR AFRICAN AMERICAN: >60
GFR NON-AFRICAN AMERICAN: >60 ML/MIN/1.73
GLUCOSE BLD-MCNC: 120 MG/DL (ref 74–99)
HCT VFR BLD CALC: 39.9 % (ref 34–48)
HEMOGLOBIN: 13.1 G/DL (ref 11.5–15.5)
IMMATURE GRANULOCYTES #: 0.04 E9/L
IMMATURE GRANULOCYTES %: 0.6 % (ref 0–5)
LYMPHOCYTES ABSOLUTE: 1.93 E9/L (ref 1.5–4)
LYMPHOCYTES RELATIVE PERCENT: 28.3 % (ref 20–42)
Lab: NORMAL
MCH RBC QN AUTO: 33.1 PG (ref 26–35)
MCHC RBC AUTO-ENTMCNC: 32.8 % (ref 32–34.5)
MCV RBC AUTO: 100.8 FL (ref 80–99.9)
METHADONE SCREEN, URINE: NOT DETECTED
MONOCYTES ABSOLUTE: 0.93 E9/L (ref 0.1–0.95)
MONOCYTES RELATIVE PERCENT: 13.6 % (ref 2–12)
NEUTROPHILS ABSOLUTE: 3.6 E9/L (ref 1.8–7.3)
NEUTROPHILS RELATIVE PERCENT: 52.7 % (ref 43–80)
OPIATE SCREEN URINE: NOT DETECTED
OXYCODONE URINE: NOT DETECTED
PDW BLD-RTO: 11.9 FL (ref 11.5–15)
PHENCYCLIDINE SCREEN URINE: NOT DETECTED
PLATELET # BLD: 138 E9/L (ref 130–450)
PMV BLD AUTO: 11.4 FL (ref 7–12)
POTASSIUM REFLEX MAGNESIUM: 4.3 MMOL/L (ref 3.5–5)
RBC # BLD: 3.96 E12/L (ref 3.5–5.5)
SALICYLATE, SERUM: <0.3 MG/DL (ref 0–30)
SODIUM BLD-SCNC: 139 MMOL/L (ref 132–146)
TOTAL PROTEIN: 6.6 G/DL (ref 6.4–8.3)
TRICYCLIC ANTIDEPRESSANTS SCREEN SERUM: NEGATIVE NG/ML
WBC # BLD: 6.8 E9/L (ref 4.5–11.5)

## 2020-05-17 PROCEDURE — 80053 COMPREHEN METABOLIC PANEL: CPT

## 2020-05-17 PROCEDURE — 6360000004 HC RX CONTRAST MEDICATION: Performed by: RADIOLOGY

## 2020-05-17 PROCEDURE — 6370000000 HC RX 637 (ALT 250 FOR IP): Performed by: EMERGENCY MEDICINE

## 2020-05-17 PROCEDURE — 80307 DRUG TEST PRSMV CHEM ANLYZR: CPT

## 2020-05-17 PROCEDURE — 93005 ELECTROCARDIOGRAM TRACING: CPT | Performed by: EMERGENCY MEDICINE

## 2020-05-17 PROCEDURE — 2580000003 HC RX 258: Performed by: RADIOLOGY

## 2020-05-17 PROCEDURE — 70498 CT ANGIOGRAPHY NECK: CPT

## 2020-05-17 PROCEDURE — G0480 DRUG TEST DEF 1-7 CLASSES: HCPCS

## 2020-05-17 PROCEDURE — 85025 COMPLETE CBC W/AUTO DIFF WBC: CPT

## 2020-05-17 PROCEDURE — 99285 EMERGENCY DEPT VISIT HI MDM: CPT

## 2020-05-17 PROCEDURE — 72125 CT NECK SPINE W/O DYE: CPT

## 2020-05-17 RX ORDER — SODIUM CHLORIDE 0.9 % (FLUSH) 0.9 %
10 SYRINGE (ML) INJECTION ONCE
Status: COMPLETED | OUTPATIENT
Start: 2020-05-17 | End: 2020-05-17

## 2020-05-17 RX ORDER — GABAPENTIN 100 MG/1
100 CAPSULE ORAL ONCE
Status: COMPLETED | OUTPATIENT
Start: 2020-05-17 | End: 2020-05-17

## 2020-05-17 RX ADMIN — IOPAMIDOL 60 ML: 755 INJECTION, SOLUTION INTRAVENOUS at 20:41

## 2020-05-17 RX ADMIN — GABAPENTIN 100 MG: 100 CAPSULE ORAL at 20:20

## 2020-05-17 RX ADMIN — Medication 10 ML: at 20:41

## 2020-05-17 ASSESSMENT — PAIN DESCRIPTION - PAIN TYPE: TYPE: ACUTE PAIN

## 2020-05-17 ASSESSMENT — PAIN SCALES - GENERAL: PAINLEVEL_OUTOF10: 6

## 2020-05-17 ASSESSMENT — PAIN DESCRIPTION - LOCATION: LOCATION: HEAD;FOOT

## 2020-05-17 ASSESSMENT — PAIN DESCRIPTION - ORIENTATION: ORIENTATION: RIGHT;LEFT

## 2020-05-17 NOTE — ED NOTES
Pt was undressed and changed into hospital gown. Belongings placed in bag and locked in cupboard in room 13. All cords/ wires removed from patients room.       Uyen Julian RN  05/17/20 0705

## 2020-05-17 NOTE — ED PROVIDER NOTES
Patient is a 49-year-old female with past medical history of anoxic brain injury, bipolar, cardiac arrest, C VA, dementia, GERD, hyperlipidemia, hypertension, schizoaffective presented to emergency department with suicidal attempt. Symptoms severe in severity. Patient was found by staff at [facility] with a cord around her neck. Patient states that she was having a suicidal attempt. She is very emotional in the room. Patient under a lot of stress recently, she states that her daughter  last week of a drug overdose. She has had suicidal ideations in the past.  They have been worsening in the last week. Nothing makes her symptoms better and her life stressors make her symptoms worse. She denies any syncope or passing out after the attempt. Denies any voice change, difficulty swallowing. Review of Systems   Constitutional: Negative for chills and fever. HENT: Negative for ear pain, sinus pressure, sore throat and trouble swallowing. Eyes: Negative for pain, discharge and redness. Respiratory: Negative for cough, shortness of breath and wheezing. Cardiovascular: Negative for chest pain. Gastrointestinal: Negative for abdominal distention, diarrhea, nausea and vomiting. Genitourinary: Negative for dysuria and frequency. Musculoskeletal: Negative for arthralgias and back pain. Skin: Negative for rash and wound. Neurological: Negative for weakness and headaches. Psychiatric/Behavioral: Positive for suicidal ideas. All other systems reviewed and are negative. Physical Exam  Vitals signs and nursing note reviewed. Constitutional:       General: She is not in acute distress. Appearance: She is well-developed. She is not ill-appearing. HENT:      Head: Normocephalic and atraumatic. Eyes:      Pupils: Pupils are equal, round, and reactive to light. Neck:      Musculoskeletal: Normal range of motion and neck supple.    Cardiovascular:      Rate and Rhythm: Normal rate and regular rhythm. Pulmonary:      Effort: Pulmonary effort is normal. No respiratory distress. Breath sounds: Normal breath sounds. No wheezing or rales. Abdominal:      General: Bowel sounds are normal.      Palpations: Abdomen is soft. Tenderness: There is no abdominal tenderness. There is no guarding or rebound. Skin:     General: Skin is warm and dry. Capillary Refill: Capillary refill takes less than 2 seconds. Neurological:      Mental Status: She is alert and oriented to person, place, and time. Cranial Nerves: No cranial nerve deficit. Coordination: Coordination normal.   Psychiatric:      Comments: Patient very emotional Thaddeus labile in the room. We will be talking to you and then starts hysterically crying. Procedures     MDM   Patient presents emergency department suicide attempt. Patient did attempt to wrap a cord around her neck. Because of the mechanism of attempt, CTA neck as well as cervical spine obtained and with no acute process noted. She has no stridor, no hoarseness, clinically stable, vital signs stable, nontoxic-appearing. Other lab work essentially unremarkable. Patient was pink slipped prior to arrival.  Consult placed to social work and patient medically cleared. Disposition pending social work.             ----------------------------------------------- PAST HISTORY --------------------------------------------  Past Medical History:  has a past medical history of Acute kidney failure with tubular necrosis ([facility] [address] Anoxic brain damage ([facility] [address] Bipolar 1 disorder ([facility] [address] Bipolar disorder ([facility] [address] Cardiac arrest ([facility] [address] Cerebral artery occlusion with cerebral infarction ([facility] [address] Cocaine abuse ([facility] [address] Dementia ([facility] [address] Depression, GERD (gastroesophageal reflux disease), Hyperlipidemia, Hyperlipidemia, Hypertension, Iron deficiency anemia, Pseudobulbar affect, Schizoaffective disorder ([facility] [address] Suicidal ideation, Tachycardia, and Tachycardia.     Past Surgical History:  has a past surgical history that includes LEEP and Foot surgery. Social History:  reports that she has quit smoking. She smoked 1.00 pack per day. She has never used smokeless tobacco. She reports previous alcohol use. She reports that she does not use drugs. Family History: family history is not on file. The patients home medications have been reviewed.     Allergies: Aripiprazole and Aspirin    ------------------------------------------------ RESULTS ---------------------------------------------------    LABS:  Results for orders placed or performed during the hospital encounter of 05/17/20   CBC Auto Differential   Result Value Ref Range    WBC 6.8 4.5 - 11.5 E9/L    RBC 3.96 3.50 - 5.50 E12/L    Hemoglobin 13.1 11.5 - 15.5 g/dL    Hematocrit 39.9 34.0 - 48.0 %    .8 (H) 80.0 - 99.9 fL    MCH 33.1 26.0 - 35.0 pg    MCHC 32.8 32.0 - 34.5 %    RDW 11.9 11.5 - 15.0 fL    Platelets 382 491 - 784 E9/L    MPV 11.4 7.0 - 12.0 fL    Neutrophils % 52.7 43.0 - 80.0 %    Immature Granulocytes % 0.6 0.0 - 5.0 %    Lymphocytes % 28.3 20.0 - 42.0 %    Monocytes % 13.6 (H) 2.0 - 12.0 %    Eosinophils % 4.2 0.0 - 6.0 %    Basophils % 0.6 0.0 - 2.0 %    Neutrophils Absolute 3.60 1.80 - 7.30 E9/L    Immature Granulocytes # 0.04 E9/L    Lymphocytes Absolute 1.93 1.50 - 4.00 E9/L    Monocytes Absolute 0.93 0.10 - 0.95 E9/L    Eosinophils Absolute 0.29 0.05 - 0.50 E9/L    Basophils Absolute 0.04 0.00 - 0.20 E9/L   Comprehensive Metabolic Panel w/ Reflex to MG   Result Value Ref Range    Sodium 139 132 - 146 mmol/L    Potassium reflex Magnesium 4.3 3.5 - 5.0 mmol/L    Chloride 104 98 - 107 mmol/L    CO2 20 (L) 22 - 29 mmol/L    Anion Gap 15 7 - 16 mmol/L    Glucose 120 (H) 74 - 99 mg/dL    BUN 20 6 - 20 mg/dL    CREATININE 0.9 0.5 - 1.0 mg/dL    GFR Non-African American >60 >=60 mL/min/1.73    GFR African American >60     Calcium 8.9 8.6 - 10.2 mg/dL    Total Protein 6.6 6.4 - 8.3 g/dL    Alb 4.0 3.5 - 5.2 g/dL    Total Bilirubin <0.2 0.0 - 1.2 mg/dL    Alkaline Phosphatase 63 35 - 104 U/L    ALT 10 0 - 32 U/L    AST 15 0 - 31 U/L   Serum Drug Screen   Result Value Ref Range    Ethanol Lvl <10 mg/dL    Acetaminophen Level <5.0 (L) 10.0 - 89.7 mcg/mL    Salicylate, Serum <5.2 0.0 - 30.0 mg/dL    TCA Scrn NEGATIVE Cutoff:300 ng/mL   Urine Drug Screen   Result Value Ref Range    Amphetamine Screen, Urine NOT DETECTED Negative <1000 ng/mL    Barbiturate Screen, Ur NOT DETECTED Negative < 200 ng/mL    Benzodiazepine Screen, Urine NOT DETECTED Negative < 200 ng/mL    Cannabinoid Scrn, Ur NOT DETECTED Negative < 50ng/mL    Cocaine Metabolite Screen, Urine NOT DETECTED Negative < 300 ng/mL    Opiate Scrn, Ur NOT DETECTED Negative < 300ng/mL    PCP Screen, Urine NOT DETECTED Negative < 25 ng/mL    Methadone Screen, Urine NOT DETECTED Negative <300 ng/mL    Oxycodone Urine NOT DETECTED Negative <100 ng/mL    FENTANYL SCREEN, URINE NOT DETECTED Negative <1 ng/mL    Drug Screen Comment: see below    EKG 12 Lead   Result Value Ref Range    Ventricular Rate 77 BPM    Atrial Rate 77 BPM    P-R Interval 144 ms    QRS Duration 80 ms    Q-T Interval 394 ms    QTc Calculation (Bazett) 445 ms    P Axis 66 degrees    R Axis 61 degrees    T Axis 82 degrees       RADIOLOGY:    All Radiology results interpreted by Radiologist unless otherwise noted. CTA NECK W CONTRAST   Final Result      Essentially negative CTA of the neck. CT CERVICAL SPINE WO CONTRAST   Final Result      No acute fracture or spondylolisthesis is identified on CT of cervical   spine. Diffuse degenerative disc and facet disease result in multilevel   central and foraminal stenosis. Cervical kyphosis which can be due to muscle spasm or positional.      Atherosclerotic vascular disease. EKG: This EKG is signed and interpreted by ED Physician. Time:  1809   Rate: 77  Rhythm: Sinus. Interpretation: no acute changes.   Comparison: stable as compared to patient's most recent EKG.    ---------------------------- NURSING NOTES AND VITALS REVIEWED -------------------------   The nursing notes within the ED encounter and vital signs as below have been reviewed. /79   Pulse 64   Temp 97.1 °F (36.2 °C)   Resp 14   Ht 5' 8\" (1.727 m)   Wt 153 lb (69.4 kg)   SpO2 97%   BMI 23.26 kg/m²   Oxygen Saturation Interpretation: Normal      ------------------------------------------PROGRESS NOTES -------------------------------------------    ED COURSE MEDICATIONS:                Medications   gabapentin (NEURONTIN) capsule 100 mg (100 mg Oral Given 5/17/20 2020)   iopamidol (ISOVUE-370) 76 % injection 60 mL (60 mLs Intravenous Given 5/17/20 2041)   sodium chloride flush 0.9 % injection 10 mL (10 mLs Intravenous Given 5/17/20 2041)       CONSULTATIONS:            Social work. PROCEDURES:            none. COUNSELING:   I have spoken with the patient and discussed todays results, in addition to providing specific details for the plan of care and counseling regarding the diagnosis and prognosis.     --------------------------------------- IMPRESSION & DISPOSITION --------------------------------     IMPRESSION(s):  1. Suicide attempt [facility])        This patient's ED course included: a personal history and physicial examination and re-evaluation prior to disposition    This patient has remained hemodynamically stable and been closely monitored during their ED course. DISPOSITION:  Disposition: Admit to mental health unit - medically cleared for admission. Patient condition is stable. END OF PROVIDER NOTE.        [address] DO Phuong  Resident  05/18/20 5470

## 2020-05-17 NOTE — ED NOTES
Bed: 13  Expected date: 5/17/20  Expected time:   Means of arrival: [facility]  Comments:  EMS     Gm Mullen, [address]  05/17/20 6387

## 2020-05-18 ENCOUNTER — APPOINTMENT (OUTPATIENT)
Dept: GENERAL RADIOLOGY | Age: 50
DRG: 817 | End: 2020-05-18
Payer: MEDICAID

## 2020-05-18 PROBLEM — U07.1 COVID-19 VIRUS INFECTION: Status: ACTIVE | Noted: 2020-05-18

## 2020-05-18 LAB
ALBUMIN SERPL-MCNC: 4.4 G/DL (ref 3.5–5.2)
ALP BLD-CCNC: 61 U/L (ref 35–104)
ALT SERPL-CCNC: 11 U/L (ref 0–32)
ANION GAP SERPL CALCULATED.3IONS-SCNC: 14 MMOL/L (ref 7–16)
APTT: 31.3 SEC (ref 24.5–35.1)
AST SERPL-CCNC: 19 U/L (ref 0–31)
BILIRUB SERPL-MCNC: <0.2 MG/DL (ref 0–1.2)
BILIRUBIN URINE: NEGATIVE
BLOOD, URINE: NEGATIVE
BUN BLDV-MCNC: 20 MG/DL (ref 6–20)
CALCIUM SERPL-MCNC: 9.4 MG/DL (ref 8.6–10.2)
CHLORIDE BLD-SCNC: 99 MMOL/L (ref 98–107)
CLARITY: CLEAR
CO2: 25 MMOL/L (ref 22–29)
COLOR: YELLOW
CREAT SERPL-MCNC: 1 MG/DL (ref 0.5–1)
D DIMER: <200 NG/ML DDU
EKG ATRIAL RATE: 77 BPM
EKG P AXIS: 66 DEGREES
EKG P-R INTERVAL: 144 MS
EKG Q-T INTERVAL: 394 MS
EKG QRS DURATION: 80 MS
EKG QTC CALCULATION (BAZETT): 445 MS
EKG R AXIS: 61 DEGREES
EKG T AXIS: 82 DEGREES
EKG VENTRICULAR RATE: 77 BPM
FERRITIN: 133 NG/ML
FIBRINOGEN: 424 MG/DL (ref 225–540)
GFR AFRICAN AMERICAN: >60
GFR NON-AFRICAN AMERICAN: 58 ML/MIN/1.73
GLUCOSE BLD-MCNC: 105 MG/DL (ref 74–99)
GLUCOSE URINE: NEGATIVE MG/DL
INR BLD: 1
KETONES, URINE: NEGATIVE MG/DL
LACTATE DEHYDROGENASE: 216 U/L (ref 135–214)
LEUKOCYTE ESTERASE, URINE: NEGATIVE
NITRITE, URINE: NEGATIVE
PH UA: 7.5 (ref 5–9)
POTASSIUM REFLEX MAGNESIUM: 4.5 MMOL/L (ref 3.5–5)
PROCALCITONIN: 0.03 NG/ML (ref 0–0.08)
PROTEIN UA: NEGATIVE MG/DL
PROTHROMBIN TIME: 10.6 SEC (ref 9.3–12.4)
SARS-COV-2, NAAT: DETECTED
SODIUM BLD-SCNC: 138 MMOL/L (ref 132–146)
SPECIFIC GRAVITY UA: 1.01 (ref 1–1.03)
TOTAL PROTEIN: 7.5 G/DL (ref 6.4–8.3)
UROBILINOGEN, URINE: 0.2 E.U./DL

## 2020-05-18 PROCEDURE — 36415 COLL VENOUS BLD VENIPUNCTURE: CPT

## 2020-05-18 PROCEDURE — 83615 LACTATE (LD) (LDH) ENZYME: CPT

## 2020-05-18 PROCEDURE — 93010 ELECTROCARDIOGRAM REPORT: CPT | Performed by: INTERNAL MEDICINE

## 2020-05-18 PROCEDURE — 6370000000 HC RX 637 (ALT 250 FOR IP): Performed by: INTERNAL MEDICINE

## 2020-05-18 PROCEDURE — G0378 HOSPITAL OBSERVATION PER HR: HCPCS

## 2020-05-18 PROCEDURE — 82728 ASSAY OF FERRITIN: CPT

## 2020-05-18 PROCEDURE — 85378 FIBRIN DEGRADE SEMIQUANT: CPT

## 2020-05-18 PROCEDURE — 81003 URINALYSIS AUTO W/O SCOPE: CPT

## 2020-05-18 PROCEDURE — 2580000003 HC RX 258: Performed by: INTERNAL MEDICINE

## 2020-05-18 PROCEDURE — 71045 X-RAY EXAM CHEST 1 VIEW: CPT

## 2020-05-18 PROCEDURE — 85730 THROMBOPLASTIN TIME PARTIAL: CPT

## 2020-05-18 PROCEDURE — 85384 FIBRINOGEN ACTIVITY: CPT

## 2020-05-18 PROCEDURE — 85610 PROTHROMBIN TIME: CPT

## 2020-05-18 PROCEDURE — 6360000002 HC RX W HCPCS: Performed by: INTERNAL MEDICINE

## 2020-05-18 PROCEDURE — 96372 THER/PROPH/DIAG INJ SC/IM: CPT

## 2020-05-18 PROCEDURE — 84145 PROCALCITONIN (PCT): CPT

## 2020-05-18 PROCEDURE — 80053 COMPREHEN METABOLIC PANEL: CPT

## 2020-05-18 PROCEDURE — U0002 COVID-19 LAB TEST NON-CDC: HCPCS

## 2020-05-18 RX ORDER — FOLIC ACID 1 MG/1
1 TABLET ORAL DAILY
Status: DISCONTINUED | OUTPATIENT
Start: 2020-05-18 | End: 2020-05-22 | Stop reason: HOSPADM

## 2020-05-18 RX ORDER — SODIUM PHOSPHATE, DIBASIC AND SODIUM PHOSPHATE, MONOBASIC 7; 19 G/133ML; G/133ML
1 ENEMA RECTAL
COMMUNITY

## 2020-05-18 RX ORDER — CHOLECALCIFEROL (VITAMIN D3) 125 MCG
5 CAPSULE ORAL NIGHTLY PRN
Status: DISCONTINUED | OUTPATIENT
Start: 2020-05-18 | End: 2020-05-22 | Stop reason: HOSPADM

## 2020-05-18 RX ORDER — NITROFURANTOIN 25; 75 MG/1; MG/1
100 CAPSULE ORAL DAILY
Status: DISCONTINUED | OUTPATIENT
Start: 2020-05-18 | End: 2020-05-22 | Stop reason: HOSPADM

## 2020-05-18 RX ORDER — NITROFURANTOIN 25; 75 MG/1; MG/1
100 CAPSULE ORAL DAILY
COMMUNITY
Start: 2020-02-27 | End: 2020-05-27

## 2020-05-18 RX ORDER — DIVALPROEX SODIUM 250 MG/1
1000 TABLET, DELAYED RELEASE ORAL NIGHTLY
Status: DISCONTINUED | OUTPATIENT
Start: 2020-05-18 | End: 2020-05-19

## 2020-05-18 RX ORDER — ATENOLOL 25 MG/1
25 TABLET ORAL DAILY
Status: DISCONTINUED | OUTPATIENT
Start: 2020-05-18 | End: 2020-05-22 | Stop reason: HOSPADM

## 2020-05-18 RX ORDER — LORAZEPAM 1 MG/1
1 TABLET ORAL 4 TIMES DAILY PRN
Status: DISCONTINUED | OUTPATIENT
Start: 2020-05-18 | End: 2020-05-22 | Stop reason: HOSPADM

## 2020-05-18 RX ORDER — METHENAMINE, SODIUM PHOSPHATE, MONOBASIC, MONOHYDRATE, PHENYL SALICYLATE, METHYLENE BLUE, AND HYOSCYAMINE SULFATE 120; 40.8; 36; 10; .12 MG/1; MG/1; MG/1; MG/1; MG/1
118 CAPSULE ORAL 4 TIMES DAILY PRN
Status: DISCONTINUED | OUTPATIENT
Start: 2020-05-18 | End: 2020-05-20 | Stop reason: RX

## 2020-05-18 RX ORDER — CETIRIZINE HYDROCHLORIDE 10 MG/1
10 TABLET ORAL DAILY
Status: DISCONTINUED | OUTPATIENT
Start: 2020-05-18 | End: 2020-05-22 | Stop reason: HOSPADM

## 2020-05-18 RX ORDER — LORAZEPAM 0.5 MG/1
0.5 TABLET ORAL EVERY 6 HOURS PRN
COMMUNITY

## 2020-05-18 RX ORDER — DIVALPROEX SODIUM 500 MG/1
1000 TABLET, DELAYED RELEASE ORAL NIGHTLY
Status: ON HOLD | COMMUNITY
End: 2020-05-26 | Stop reason: HOSPADM

## 2020-05-18 RX ORDER — SODIUM CHLORIDE 0.9 % (FLUSH) 0.9 %
10 SYRINGE (ML) INJECTION PRN
Status: DISCONTINUED | OUTPATIENT
Start: 2020-05-18 | End: 2020-05-22 | Stop reason: HOSPADM

## 2020-05-18 RX ORDER — LACTULOSE 10 G/15ML
20 SOLUTION ORAL 3 TIMES DAILY
COMMUNITY

## 2020-05-18 RX ORDER — CHOLECALCIFEROL (VITAMIN D3) 125 MCG
5 CAPSULE ORAL NIGHTLY PRN
COMMUNITY

## 2020-05-18 RX ORDER — PALIPERIDONE 9 MG/1
9 TABLET, EXTENDED RELEASE ORAL EVERY MORNING
Status: DISCONTINUED | OUTPATIENT
Start: 2020-05-19 | End: 2020-05-19

## 2020-05-18 RX ORDER — AMLODIPINE BESYLATE 10 MG/1
10 TABLET ORAL DAILY
Status: DISCONTINUED | OUTPATIENT
Start: 2020-05-18 | End: 2020-05-22 | Stop reason: HOSPADM

## 2020-05-18 RX ORDER — NICOTINE 21 MG/24HR
1 PATCH, TRANSDERMAL 24 HOURS TRANSDERMAL EVERY 24 HOURS
Status: DISCONTINUED | OUTPATIENT
Start: 2020-05-18 | End: 2020-05-22 | Stop reason: HOSPADM

## 2020-05-18 RX ORDER — PALIPERIDONE 9 MG/1
9 TABLET, EXTENDED RELEASE ORAL EVERY MORNING
Status: ON HOLD | COMMUNITY
End: 2020-05-26 | Stop reason: HOSPADM

## 2020-05-18 RX ORDER — FERROUS SULFATE 325(65) MG
325 TABLET ORAL
Status: DISCONTINUED | OUTPATIENT
Start: 2020-05-19 | End: 2020-05-22 | Stop reason: HOSPADM

## 2020-05-18 RX ORDER — METHENAMINE, SODIUM PHOSPHATE, MONOBASIC, MONOHYDRATE, PHENYL SALICYLATE, METHYLENE BLUE, AND HYOSCYAMINE SULFATE 120; 40.8; 36; 10; .12 MG/1; MG/1; MG/1; MG/1; MG/1
118 CAPSULE ORAL 4 TIMES DAILY PRN
Status: ON HOLD | COMMUNITY
End: 2020-05-26 | Stop reason: HOSPADM

## 2020-05-18 RX ORDER — PROMETHAZINE HYDROCHLORIDE 25 MG/1
12.5 TABLET ORAL EVERY 6 HOURS PRN
Status: DISCONTINUED | OUTPATIENT
Start: 2020-05-18 | End: 2020-05-22 | Stop reason: HOSPADM

## 2020-05-18 RX ORDER — ACETAMINOPHEN 325 MG/1
650 TABLET ORAL EVERY 6 HOURS PRN
Status: DISCONTINUED | OUTPATIENT
Start: 2020-05-18 | End: 2020-05-22 | Stop reason: HOSPADM

## 2020-05-18 RX ORDER — LACTULOSE 10 G/15ML
20 SOLUTION ORAL 3 TIMES DAILY
Status: DISCONTINUED | OUTPATIENT
Start: 2020-05-18 | End: 2020-05-22 | Stop reason: HOSPADM

## 2020-05-18 RX ORDER — SUCRALFATE 1 G/1
1 TABLET ORAL 2 TIMES DAILY
Status: DISCONTINUED | OUTPATIENT
Start: 2020-05-18 | End: 2020-05-22 | Stop reason: HOSPADM

## 2020-05-18 RX ORDER — BISACODYL 10 MG
10 SUPPOSITORY, RECTAL RECTAL DAILY PRN
COMMUNITY

## 2020-05-18 RX ORDER — SODIUM CHLORIDE 0.9 % (FLUSH) 0.9 %
10 SYRINGE (ML) INJECTION EVERY 12 HOURS SCHEDULED
Status: DISCONTINUED | OUTPATIENT
Start: 2020-05-18 | End: 2020-05-22 | Stop reason: HOSPADM

## 2020-05-18 RX ORDER — MIRTAZAPINE 15 MG/1
15 TABLET, FILM COATED ORAL NIGHTLY
Status: DISCONTINUED | OUTPATIENT
Start: 2020-05-18 | End: 2020-05-19

## 2020-05-18 RX ORDER — QUETIAPINE FUMARATE 100 MG/1
100 TABLET, FILM COATED ORAL NIGHTLY
Status: DISCONTINUED | OUTPATIENT
Start: 2020-05-18 | End: 2020-05-19

## 2020-05-18 RX ORDER — ONDANSETRON 2 MG/ML
4 INJECTION INTRAMUSCULAR; INTRAVENOUS EVERY 6 HOURS PRN
Status: DISCONTINUED | OUTPATIENT
Start: 2020-05-18 | End: 2020-05-22 | Stop reason: HOSPADM

## 2020-05-18 RX ORDER — FAMOTIDINE 20 MG/1
20 TABLET, FILM COATED ORAL 2 TIMES DAILY
Status: DISCONTINUED | OUTPATIENT
Start: 2020-05-18 | End: 2020-05-22 | Stop reason: HOSPADM

## 2020-05-18 RX ORDER — RIVASTIGMINE 9.5 MG/24H
1 PATCH, EXTENDED RELEASE TRANSDERMAL DAILY
Status: DISCONTINUED | OUTPATIENT
Start: 2020-05-18 | End: 2020-05-22 | Stop reason: HOSPADM

## 2020-05-18 RX ORDER — NICOTINE 21 MG/24HR
1 PATCH, TRANSDERMAL 24 HOURS TRANSDERMAL EVERY 24 HOURS
COMMUNITY

## 2020-05-18 RX ADMIN — ACETAMINOPHEN 650 MG: 325 TABLET ORAL at 17:48

## 2020-05-18 RX ADMIN — FOLIC ACID 1 MG: 1 TABLET ORAL at 17:29

## 2020-05-18 RX ADMIN — CONJUGATED ESTROGENS 0.5 G: 0.62 CREAM VAGINAL at 20:37

## 2020-05-18 RX ADMIN — DIVALPROEX SODIUM 1000 MG: 250 TABLET, DELAYED RELEASE ORAL at 20:37

## 2020-05-18 RX ADMIN — NITROFURANTOIN MONOHYDRATE/MACROCRYSTALLINE 100 MG: 25; 75 CAPSULE ORAL at 17:32

## 2020-05-18 RX ADMIN — LACTULOSE 20 G: 20 SOLUTION ORAL at 20:37

## 2020-05-18 RX ADMIN — CETIRIZINE HYDROCHLORIDE 10 MG: 10 TABLET, FILM COATED ORAL at 17:32

## 2020-05-18 RX ADMIN — ENOXAPARIN SODIUM 30 MG: 30 INJECTION, SOLUTION INTRAVENOUS; SUBCUTANEOUS at 20:36

## 2020-05-18 RX ADMIN — LACTULOSE 20 G: 20 SOLUTION ORAL at 17:32

## 2020-05-18 RX ADMIN — FAMOTIDINE 20 MG: 20 TABLET ORAL at 20:37

## 2020-05-18 RX ADMIN — Medication 10 ML: at 21:02

## 2020-05-18 RX ADMIN — ACETAMINOPHEN 650 MG: 325 TABLET ORAL at 23:43

## 2020-05-18 RX ADMIN — QUETIAPINE FUMARATE 100 MG: 100 TABLET ORAL at 20:37

## 2020-05-18 RX ADMIN — MIRTAZAPINE 15 MG: 15 TABLET, FILM COATED ORAL at 20:37

## 2020-05-18 ASSESSMENT — ENCOUNTER SYMPTOMS
EYE PAIN: 0
VOMITING: 0
TROUBLE SWALLOWING: 0
SORE THROAT: 0
COUGH: 0
NAUSEA: 0
WHEEZING: 0
BACK PAIN: 0
ABDOMINAL DISTENTION: 0
DIARRHEA: 0
EYE DISCHARGE: 0
SHORTNESS OF BREATH: 0
EYE REDNESS: 0
SINUS PRESSURE: 0

## 2020-05-18 ASSESSMENT — PAIN DESCRIPTION - FREQUENCY: FREQUENCY: INTERMITTENT

## 2020-05-18 ASSESSMENT — PAIN SCALES - GENERAL
PAINLEVEL_OUTOF10: 3
PAINLEVEL_OUTOF10: 3
PAINLEVEL_OUTOF10: 0
PAINLEVEL_OUTOF10: 0

## 2020-05-18 ASSESSMENT — PAIN DESCRIPTION - DESCRIPTORS: DESCRIPTORS: ACHING;DISCOMFORT

## 2020-05-18 ASSESSMENT — PAIN DESCRIPTION - ORIENTATION: ORIENTATION: RIGHT;LEFT

## 2020-05-18 ASSESSMENT — PAIN DESCRIPTION - LOCATION: LOCATION: FOOT

## 2020-05-18 ASSESSMENT — PAIN DESCRIPTION - PAIN TYPE: TYPE: CHRONIC PAIN

## 2020-05-18 ASSESSMENT — PAIN - FUNCTIONAL ASSESSMENT: PAIN_FUNCTIONAL_ASSESSMENT: ACTIVITIES ARE NOT PREVENTED

## 2020-05-18 NOTE — ED NOTES
The pt is a 44 yr old white female who was sent in by the  that she lives at due to wrapping a cord around her neck in a suicidal attempt. She stated that she has been suicidal all day because she is depressed b/c her 32 yr old daughter  of an OD 3 days ago. She stated that she would also like to harm some of the staff at the [place]. The pt was admitted for a similar occurrence in 2019 to [facility].  The pt stated that she came to the NH 7 years ago after she had a near fatel OD attempt on Wellbutrin. She stated that she suffered a stroke at that time and her family told her she cannot come home. She admitted to crack and alcohol abuse prior to this attempt. The pt denies HI and AVH past or present. She stated that she has been having a lot of difficulty sleeping. She stated that Dr. Erasmo Bautista is her pre scriber at the NH and she stated that she wishes that she could see a counselor. There are no beds currently on psych- the pt will be referred out for placement.        [facility]  20

## 2020-05-18 NOTE — ED NOTES
Due to patient having multiple bathroom trips, this nurse placed a pure wick on the patient so that she can decrease the amount of ambulation, ultimately decreasing pain in her feet.       Sharad Nolan RN  05/17/20 2123

## 2020-05-18 NOTE — PROGRESS NOTES
Pt continues to state she has thoughts of killing herself with no plan currently. Constant observation and suicide precautions maintained. Belongings outside pt room.

## 2020-05-18 NOTE — ED NOTES
Call to [facility] to cancel patient referral due to positive testing result, spoke with [facility].      Micky Mariano  05/18/20 0302

## 2020-05-18 NOTE — ED NOTES
Patient returned from XR, currently on the way to use the restroom     Tamiko Washington RN  05/17/20 2050

## 2020-05-18 NOTE — ED NOTES
Pt resting in bed with eyes closed and respirations easy and unlabored.       Christophe Russ RN  05/18/20 6209

## 2020-05-18 NOTE — ED NOTES
Pt resting in bed watching TV. No complaints or requests at this time.       Josi Mart RN  05/18/20 9719

## 2020-05-18 NOTE — ED NOTES
Patient continued to complain of pain to her feet, patient has been up frequently to use the restroom, patient asking for pain medications she was advised that she just received neurontin, she then asked from medication to help her sleep, patient advised that she will not receive all her home medications in the emergency department, patient is waiting to be medically cleared, psych services is finding placement.       Demetra Sierra RN  05/17/20 3670

## 2020-05-18 NOTE — PLAN OF CARE
Problem: Suicide risk  Goal: Provide patient with safe environment  Description: Provide patient with safe environment  Outcome: Met This Shift     Problem: Falls - Risk of:  Goal: Will remain free from falls  Description: Will remain free from falls  Outcome: Met This Shift

## 2020-05-18 NOTE — H&P
Hospital Medicine History & Physical      PCP: Alesha Mckenzie MD    Date of Admission: 5/17/2020    Date of Service: Pt seen/examined on 5/18/2020 and Admitted to Inpatient with expected LOS less than than two midnights due to medical therapy. Chief Complaint: Suicide attempt      History Of Present Illness:      44 y.o. female history of CVA, GERD, bipolar who was brought in from nursing home  Due to suicide attempt. The patient lives in a single room at nursing home. She has been staying at a nursing home after she had a near fatal overdose attempted Wellbutrin and possible anoxic brain injury. She has been having suicidal ideation. Has history of attempted suicide. She recently lost her 68-year-old daughter from drug overdose. She has been mourning her daughter. Decided to take her life. She grabbed onto cord in her room and attempted to strangle herself. ER, lab work was unremarkable. Urine drug tox screen was negative. CTA neck with contrast was unremarkable. COVID-19 testing was positive. She was seen by psychiatry consult but cannot be admitted due to COVID-19 positive testing. Past Medical History:          Diagnosis Date    Acute kidney failure with tubular necrosis (HCC)     Anoxic brain damage (HCC)     Bipolar 1 disorder (HCC)     Bipolar disorder (HCC)     Cardiac arrest ([address]     Cerebral artery occlusion with cerebral infarction ([address]     Cocaine abuse ([address]     Dementia ([address]     Depression     GERD (gastroesophageal reflux disease)     Hyperlipidemia     Hyperlipidemia     Hypertension     Iron deficiency anemia     Pseudobulbar affect     Schizoaffective disorder (HCC)     Suicidal ideation     Tachycardia     Tachycardia        Past Surgical History:          Procedure Laterality Date    FOOT SURGERY      LEEP         Medications Prior to Admission:      Prior to Admission medications    Medication Sig Start Date End Date Taking?  Authorizing Provider LORazepam (ATIVAN) 1 MG tablet Take 1 mg by mouth 4 times daily as needed for Anxiety.    Yes Historical Provider, MD   bisacodyl (DULCOLAX) 5 MG EC tablet Take 10 mg by mouth daily as needed for Constipation   Yes Historical Provider, MD   divalproex (DEPAKOTE) 500 MG DR tablet Take 1,000 mg by mouth nightly   Yes Historical Provider, MD   bisacodyl (DULCOLAX) 10 MG suppository Place 10 mg rectally daily as needed for Constipation   Yes Historical Provider, MD   Sodium Phosphates (FLEET) 7-19 GM/118ML Place 1 enema rectally once as needed (constipation)   Yes Historical Provider, MD   paliperidone (INVEGA) 9 MG extended release tablet Take 9 mg by mouth every morning   Yes Historical Provider, MD   lactulose (CHRONULAC) 10 GM/15ML solution Take 20 g by mouth 3 times daily   Yes Historical Provider, MD   nitrofurantoin, macrocrystal-monohydrate, (MACROBID) 100 MG capsule Take 100 mg by mouth daily 2/27/20 5/27/20 Yes Historical Provider, MD   magnesium hydroxide (MILK OF MAGNESIA) 400 MG/5ML suspension Take 30 mLs by mouth daily as needed for Constipation   Yes Historical Provider, MD   melatonin 5 MG TABS tablet Take 5 mg by mouth nightly as needed (sleep)   Yes Historical Provider, MD   nicotine (NICODERM CQ) 14 MG/24HR Place 1 patch onto the skin every 24 hours   Yes Historical Provider, MD   conjugated estrogens (PREMARIN) 0.625 MG/GM vaginal cream Place 0.5 g vaginally every 72 hours   Yes Historical Provider, MD   Meth-Hyo-M Bl-Na Phos-Ph Sal (URIBEL) 118 MG CAPS Take 118 mg by mouth 4 times daily as needed (dysuria)   Yes Historical Provider, MD   acetaminophen (TYLENOL) 325 MG tablet Take 650 mg by mouth every 4 hours as needed for Pain or Fever    Yes Historical Provider, MD   atenolol (TENORMIN) 25 MG tablet Take 25 mg by mouth daily   Yes Historical Provider, MD   sucralfate (CARAFATE) 1 GM tablet Take 1 g by mouth 2 times daily    Yes Historical Provider, MD   cetirizine (ZYRTEC) 10 MG tablet Take 10 mg by mouth daily   Yes Historical Provider, MD   rivastigmine (EXELON) 9.5 MG/24HR Place 1 patch onto the skin daily   Yes Historical Provider, MD   ferrous sulfate 325 (65 Fe) MG tablet Take 325 mg by mouth daily (with breakfast)   Yes Historical Provider, MD   folic acid (FOLVITE) 1 MG tablet Take 1 mg by mouth daily   Yes Historical Provider, MD   mirabegron (MYRBETRIQ) 50 MG TB24 Take 50 mg by mouth daily    Yes Historical Provider, MD   amLODIPine (NORVASC) 10 MG tablet Take 10 mg by mouth daily   Yes Historical Provider, MD   famotidine (PEPCID) 20 MG tablet Take 20 mg by mouth 2 times daily   Yes Historical Provider, MD   QUEtiapine (SEROQUEL) 100 MG tablet Take 100 mg by mouth nightly    Yes Historical Provider, MD   mirtazapine (REMERON) 15 MG tablet Take 15 mg by mouth nightly    Yes Historical Provider, MD       Allergies:  Aripiprazole and Aspirin    Social History:      The patient currently lives at skilled nursing facility    TOBACCO:   reports that she has quit smoking. She smoked 1.00 pack per day. She has never used smokeless tobacco.  ETOH:   reports previous alcohol use. E-Cigarettes Vaping or Juuling     Questions Responses    Vaping Use Never User    Start Date     Does device contain nicotine? Quit Date     Vaping Type             Family History:       Reviewed in detail and negative for DM, CAD, Cancer, CVA. Positive as follows:    History reviewed. No pertinent family history. REVIEW OF SYSTEMS:   Pertinent positives as noted in the HPI. All other systems reviewed and negative. PHYSICAL EXAM PERFORMED:    BP (!) 142/78   Pulse 74   Temp 98.2 °F (36.8 °C) (Oral)   Resp 18   Ht 5' 8\" (1.727 m)   Wt 153 lb (69.4 kg)   SpO2 97%   BMI 23.26 kg/m²     General appearance:  No apparent distress, appears stated age and cooperative. HEENT:  Normal cephalic, atraumatic without obvious deformity. Pupils equal, round, and reactive to light. Extra ocular muscles intact. Conjunctivae/corneas clear. Neck: Supple, with full range of motion. No jugular venous distention. Trachea midline. Respiratory:  Normal respiratory effort. Clear to auscultation, bilaterally without Rales/Wheezes/Rhonchi. Cardiovascular:  Regular rate and rhythm with normal S1/S2 without murmurs, rubs or gallops. Abdomen: Soft, non-tender, non-distended with normal bowel sounds. Musculoskeletal:  No clubbing, cyanosis or edema bilaterally. Full range of motion without deformity. Skin: Skin color, texture, turgor normal.  No rashes or lesions. Neurologic:  Neurovascularly intact without any focal sensory/motor deficits. Cranial nerves: II-XII intact, grossly non-focal.  Psychiatric:  Alert and oriented, thought content appropriate, normal insight  Capillary Refill: Brisk,< 3 seconds   Peripheral Pulses: +2 palpable, equal bilaterally       Labs:     Recent Labs     05/17/20  1831   WBC 6.8   HGB 13.1   HCT 39.9        Recent Labs     05/17/20  1831      K 4.3      CO2 20*   BUN 20   CREATININE 0.9   CALCIUM 8.9     Recent Labs     05/17/20  1831   AST 15   ALT 10   BILITOT <0.2   ALKPHOS 63     No results for input(s): INR in the last 72 hours. No results for input(s): Scarlet Hodgkins in the last 72 hours. Urinalysis:      Lab Results   Component Value Date    NITRU Negative 05/18/2020    WBCUA 11-15 08/26/2017    BACTERIA 2+ 08/26/2017    RBCUA 11-15 08/26/2017    BLOODU Negative 05/18/2020    SPECGRAV 1.015 05/18/2020    GLUCOSEU Negative 05/18/2020       Radiology:   EKG: Normal sinus rhythm at 77 bpm, no ST elevation or depression    CTA NECK W CONTRAST   Final Result      Essentially negative CTA of the neck. CT CERVICAL SPINE WO CONTRAST   Final Result      No acute fracture or spondylolisthesis is identified on CT of cervical   spine. Diffuse degenerative disc and facet disease result in multilevel   central and foraminal stenosis.       Cervical kyphosis which can be due to muscle spasm or positional.      Atherosclerotic vascular disease. ASSESSMENT:  #COVID-19 infection  Patient is asymptomatic and does not have any evidence of pneumonia on chest x-ray  Continue supportive care  Check inflammatory markers  Supplemental oxygen as needed    #Suicide attempt  -Continue one-to-one sit  -Continue psychiatric medications  -Psychiatry consult    #Bipolar disorder-continue medications    #Chronic constipation-continue stool softeners    #GERD-continue antiacid medication    DVT Prophylaxis:   Diet: No diet orders on file  Code Status: No Order    PT/OT Eval Status: None    Dispo -discharge will depend on psychiatry evaluation       Asha Bruno MD    Thank you Maricruz Reyes MD for the opportunity to be involved in this patient's care. If you have any questions or concerns please feel free to contact me at [phone].

## 2020-05-19 PROBLEM — F43.25 ADJUSTMENT REACTION WITH MIXED DISTURBANCE OF EMOTIONS AND CONDUCT: Status: ACTIVE | Noted: 2020-05-19

## 2020-05-19 PROBLEM — T14.91XA SUICIDE ATTEMPT (HCC): Status: ACTIVE | Noted: 2020-05-19

## 2020-05-19 PROBLEM — F03.918 DEMENTIA WITH BEHAVIORAL DISTURBANCE: Status: ACTIVE | Noted: 2020-05-19

## 2020-05-19 LAB
ALBUMIN SERPL-MCNC: 3.6 G/DL (ref 3.5–5.2)
ALP BLD-CCNC: 64 U/L (ref 35–104)
ALT SERPL-CCNC: 10 U/L (ref 0–32)
ANION GAP SERPL CALCULATED.3IONS-SCNC: 15 MMOL/L (ref 7–16)
APTT: <20 SEC (ref 24.5–35.1)
AST SERPL-CCNC: 19 U/L (ref 0–31)
BILIRUB SERPL-MCNC: <0.2 MG/DL (ref 0–1.2)
BUN BLDV-MCNC: 26 MG/DL (ref 6–20)
CALCIUM SERPL-MCNC: 9.2 MG/DL (ref 8.6–10.2)
CHLORIDE BLD-SCNC: 102 MMOL/L (ref 98–107)
CO2: 19 MMOL/L (ref 22–29)
CREAT SERPL-MCNC: 0.9 MG/DL (ref 0.5–1)
D DIMER: <200 NG/ML DDU
FERRITIN: 157 NG/ML
FIBRINOGEN: 371 MG/DL (ref 225–540)
GFR AFRICAN AMERICAN: >60
GFR NON-AFRICAN AMERICAN: >60 ML/MIN/1.73
GLUCOSE BLD-MCNC: 92 MG/DL (ref 74–99)
INR BLD: 0.9
POTASSIUM REFLEX MAGNESIUM: 4.8 MMOL/L (ref 3.5–5)
PROTHROMBIN TIME: 10 SEC (ref 9.3–12.4)
SODIUM BLD-SCNC: 136 MMOL/L (ref 132–146)
TOTAL PROTEIN: 6.6 G/DL (ref 6.4–8.3)

## 2020-05-19 PROCEDURE — 99253 IP/OBS CNSLTJ NEW/EST LOW 45: CPT | Performed by: NURSE PRACTITIONER

## 2020-05-19 PROCEDURE — 85730 THROMBOPLASTIN TIME PARTIAL: CPT

## 2020-05-19 PROCEDURE — 80053 COMPREHEN METABOLIC PANEL: CPT

## 2020-05-19 PROCEDURE — 6360000002 HC RX W HCPCS: Performed by: INTERNAL MEDICINE

## 2020-05-19 PROCEDURE — 85378 FIBRIN DEGRADE SEMIQUANT: CPT

## 2020-05-19 PROCEDURE — 1200000000 HC SEMI PRIVATE

## 2020-05-19 PROCEDURE — 6370000000 HC RX 637 (ALT 250 FOR IP): Performed by: NURSE PRACTITIONER

## 2020-05-19 PROCEDURE — 6370000000 HC RX 637 (ALT 250 FOR IP): Performed by: INTERNAL MEDICINE

## 2020-05-19 PROCEDURE — 85610 PROTHROMBIN TIME: CPT

## 2020-05-19 PROCEDURE — 82728 ASSAY OF FERRITIN: CPT

## 2020-05-19 PROCEDURE — 2580000003 HC RX 258: Performed by: INTERNAL MEDICINE

## 2020-05-19 PROCEDURE — 85384 FIBRINOGEN ACTIVITY: CPT

## 2020-05-19 PROCEDURE — 36415 COLL VENOUS BLD VENIPUNCTURE: CPT

## 2020-05-19 PROCEDURE — 96372 THER/PROPH/DIAG INJ SC/IM: CPT

## 2020-05-19 RX ORDER — MIRTAZAPINE 15 MG/1
7.5 TABLET, FILM COATED ORAL NIGHTLY
Status: DISCONTINUED | OUTPATIENT
Start: 2020-05-19 | End: 2020-05-22 | Stop reason: HOSPADM

## 2020-05-19 RX ORDER — PALIPERIDONE 6 MG/1
6 TABLET, EXTENDED RELEASE ORAL NIGHTLY
Status: DISCONTINUED | OUTPATIENT
Start: 2020-05-20 | End: 2020-05-20

## 2020-05-19 RX ORDER — PETROLATUM 42 G/100G
OINTMENT TOPICAL 2 TIMES DAILY PRN
Status: DISCONTINUED | OUTPATIENT
Start: 2020-05-19 | End: 2020-05-22 | Stop reason: HOSPADM

## 2020-05-19 RX ORDER — DIVALPROEX SODIUM 500 MG/1
500 TABLET, EXTENDED RELEASE ORAL NIGHTLY
Status: DISCONTINUED | OUTPATIENT
Start: 2020-05-19 | End: 2020-05-22 | Stop reason: HOSPADM

## 2020-05-19 RX ORDER — CHOLECALCIFEROL (VITAMIN D3) 125 MCG
5 CAPSULE ORAL NIGHTLY
Status: DISCONTINUED | OUTPATIENT
Start: 2020-05-19 | End: 2020-05-22 | Stop reason: HOSPADM

## 2020-05-19 RX ADMIN — MIRTAZAPINE 7.5 MG: 15 TABLET, FILM COATED ORAL at 21:27

## 2020-05-19 RX ADMIN — FAMOTIDINE 20 MG: 20 TABLET ORAL at 21:57

## 2020-05-19 RX ADMIN — DIVALPROEX SODIUM 500 MG: 500 TABLET, EXTENDED RELEASE ORAL at 21:27

## 2020-05-19 RX ADMIN — SUCRALFATE 1 G: 1 TABLET ORAL at 08:44

## 2020-05-19 RX ADMIN — LACTULOSE 20 G: 20 SOLUTION ORAL at 08:43

## 2020-05-19 RX ADMIN — CETIRIZINE HYDROCHLORIDE 10 MG: 10 TABLET, FILM COATED ORAL at 08:44

## 2020-05-19 RX ADMIN — ENOXAPARIN SODIUM 30 MG: 30 INJECTION, SOLUTION INTRAVENOUS; SUBCUTANEOUS at 21:57

## 2020-05-19 RX ADMIN — FAMOTIDINE 20 MG: 20 TABLET ORAL at 08:45

## 2020-05-19 RX ADMIN — Medication 5 MG: at 21:56

## 2020-05-19 RX ADMIN — PALIPERIDONE 9 MG: 9 TABLET, EXTENDED RELEASE ORAL at 08:44

## 2020-05-19 RX ADMIN — LACTULOSE 20 G: 20 SOLUTION ORAL at 21:27

## 2020-05-19 RX ADMIN — AMLODIPINE BESYLATE 10 MG: 10 TABLET ORAL at 08:53

## 2020-05-19 RX ADMIN — FOLIC ACID 1 MG: 1 TABLET ORAL at 08:44

## 2020-05-19 RX ADMIN — SUCRALFATE 1 G: 1 TABLET ORAL at 21:27

## 2020-05-19 RX ADMIN — ACETAMINOPHEN 650 MG: 325 TABLET ORAL at 21:27

## 2020-05-19 RX ADMIN — LORAZEPAM 1 MG: 1 TABLET ORAL at 21:27

## 2020-05-19 RX ADMIN — ATENOLOL 25 MG: 25 TABLET ORAL at 08:53

## 2020-05-19 RX ADMIN — NITROFURANTOIN MONOHYDRATE/MACROCRYSTALLINE 100 MG: 25; 75 CAPSULE ORAL at 08:43

## 2020-05-19 RX ADMIN — ACETAMINOPHEN 650 MG: 325 TABLET ORAL at 13:34

## 2020-05-19 RX ADMIN — FERROUS SULFATE TAB 325 MG (65 MG ELEMENTAL FE) 325 MG: 325 (65 FE) TAB at 08:44

## 2020-05-19 RX ADMIN — Medication 10 ML: at 21:32

## 2020-05-19 RX ADMIN — ENOXAPARIN SODIUM 30 MG: 30 INJECTION, SOLUTION INTRAVENOUS; SUBCUTANEOUS at 08:43

## 2020-05-19 RX ADMIN — Medication 10 ML: at 08:43

## 2020-05-19 ASSESSMENT — PAIN DESCRIPTION - LOCATION: LOCATION: FOOT

## 2020-05-19 ASSESSMENT — PAIN SCALES - GENERAL
PAINLEVEL_OUTOF10: 1
PAINLEVEL_OUTOF10: 0
PAINLEVEL_OUTOF10: 3
PAINLEVEL_OUTOF10: 4
PAINLEVEL_OUTOF10: 0
PAINLEVEL_OUTOF10: 6

## 2020-05-19 ASSESSMENT — PAIN DESCRIPTION - ORIENTATION: ORIENTATION: RIGHT;LEFT

## 2020-05-19 ASSESSMENT — PAIN DESCRIPTION - DESCRIPTORS: DESCRIPTORS: ACHING

## 2020-05-19 ASSESSMENT — PAIN DESCRIPTION - PAIN TYPE: TYPE: CHRONIC PAIN

## 2020-05-19 NOTE — CONSULTS
Department of Psychiatry  Behavioral Health Consult    REASON FOR CONSULT: Suicide attempt    CONSULTING PHYSICIAN: Morales Grimm MD    History obtained from: Patient, medical record, nursing staff    HISTORY OF PRESENT ILLNESS:      The patient is a 44 y.o. female with significant past psychiatric history of patient who presented to the emergency department after attempting to strangle herself to death with a cord. The patient is currently living at [facility]. Patient has a past medical history of anoxic brain injury, bipolar disorder, cardiac arrest, CVA, dementia, hypertension, schizoaffective. According to medical record the patient was found by the staff at [facility] with a cord around her neck. She states that she was attempting to kill herself and was very emotional at the time. The patient stated that she was under a lot of stress recently due to her daughter dying last week of a drug overdose. During her emergency department mission it was found that the patient was COVID-19 positive and she was sent to the medical floor for treatment. Psychiatry was consulted due to the suicide attempt. I met with the patient via tele-psychiatry while I was resident in the building. The patient was very flat on presentation and due to her decreased cognition it was very hard to have a meaningful conversation with this patient. The patient did restate that she is recently been depressed because her 31-year daughter overdosed 3 days prior to admission and she attempted to strangle herself using a cord at the nursing home. The patient admits to a past psychiatric history of schizoaffective disorder. The patient admits to being admitted psychiatrically in the past and states that she had a serious suicide attempt 7 years ago after she had a near fatal overdose attempt on Wellbutrin.   Due to the suicide attempt 7 years ago the patient required nursing home placement after she recovered due to suffering from a stroke. The patient also admits to previously having a substance abuse history involving crack cocaine and alcohol abuse. I discussed with this patient that we will review her medications with the attending physician and make medication adjustments to better serve her needs. The patient was agreeable to this and gave consent to this. The patient is currently receiving care for the above psychiatric illness. Psychiatric Review of Systems       Depression: Denies     Caprice or Hypomania:  no     Panic Attacks:  no     Phobias:  no     Obsessions and Compulsions:  no     PTSD : No     Hallucinations:  no     Delusions:  no      Substance Abuse History:  ETOH: Previous use  Marijuana: No  Opiates: No  Other Drugs: Abuse crack cocaine use      Past Psychiatric History:  Prior Diagnosis: Schizoaffective bipolar type  Psychiatrist: Unknown  Therapist: Unknown  Hospitalization: yes  Hx of Suicidal Attempts: yes  Hx of violence:  no  ECT: no    Past Medical History:        Diagnosis Date    Acute kidney failure with tubular necrosis (HCC)     Anoxic brain damage (HCC)     Bipolar 1 disorder (HCC)     B[address] [facility] [address]     Cardiac arrest ([facility]     Cerebral artery occlusion with cerebral infarction ([facility]     Cocaine abuse ([facility]     Dementia ([facility]     Depression     GERD (gastroesophageal reflux disease)     Hyperlipidemia     Hyperlipidemia     Hypertension     Iron deficiency anemia     Pseudobulbar affect     Schizoaffective disorder (HCC)     Suicidal ideation     Tachycardia     Tachycardia        Past Surgical History:        Procedure Laterality Date    FOOT SURGERY      LEEP         Medications Prior to Admission:   Medications Prior to Admission: LORazepam (ATIVAN) 1 MG tablet, Take 1 mg by mouth 4 times daily as needed for Anxiety.   bisacodyl (DULCOLAX) 5 MG EC tablet, Take 10 mg by mouth daily as needed for Constipation  divalproex (DEPAKOTE) 500 MG DR tablet, Take 1,000 mg by mouth nightly  bisacodyl (DULCOLAX) 10 MG suppository, Place 10 mg rectally daily as needed for Constipation  Sodium Phosphates (FLEET) 7-19 GM/118ML, Place 1 enema rectally once as needed (constipation)  paliperidone (INVEGA) 9 MG extended release tablet, Take 9 mg by mouth every morning  lactulose (CHRONULAC) 10 GM/15ML solution, Take 20 g by mouth 3 times daily  nitrofurantoin, macrocrystal-monohydrate, (MACROBID) 100 MG capsule, Take 100 mg by mouth daily  magnesium hydroxide (MILK OF MAGNESIA) 400 MG/5ML suspension, Take 30 mLs by mouth daily as needed for Constipation  melatonin 5 MG TABS tablet, Take 5 mg by mouth nightly as needed (sleep)  nicotine (NICODERM CQ) 14 MG/24HR, Place 1 patch onto the skin every 24 hours  conjugated estrogens (PREMARIN) 0.625 MG/GM vaginal cream, Place 0.5 g vaginally every 72 hours  Meth-Hyo-M Bl-Na Phos-Ph Sal (URIBEL) 118 MG CAPS, Take 118 mg by mouth 4 times daily as needed (dysuria)  acetaminophen (TYLENOL) 325 MG tablet, Take 650 mg by mouth every 4 hours as needed for Pain or Fever   atenolol (TENORMIN) 25 MG tablet, Take 25 mg by mouth daily  sucralfate (CARAFATE) 1 GM tablet, Take 1 g by mouth 2 times daily   cetirizine (ZYRTEC) 10 MG tablet, Take 10 mg by mouth daily  rivastigmine (EXELON) 9.5 MG/24HR, Place 1 patch onto the skin daily  ferrous sulfate 325 (65 Fe) MG tablet, Take 325 mg by mouth daily (with breakfast)  folic acid (FOLVITE) 1 MG tablet, Take 1 mg by mouth daily  mirabegron (MYRBETRIQ) 50 MG TB24, Take 50 mg by mouth daily   amLODIPine (NORVASC) 10 MG tablet, Take 10 mg by mouth daily  famotidine (PEPCID) 20 MG tablet, Take 20 mg by mouth 2 times daily  QUEtiapine (SEROQUEL) 100 MG tablet, Take 100 mg by mouth nightly   mirtazapine (REMERON) 15 MG tablet, Take 15 mg by mouth nightly     Allergies:  Aripiprazole and Aspirin    FAMILY/SOCIAL HISTORY:  History reviewed. No pertinent family history.   Social History     Socioeconomic History []Yes   []No   LMP:   Musculoskeletal:  []Back pain  []Neck pain  []Recent Injury   Skin:  []Rash  [] Itching  [] Other:  Neurologic:  [] Headache  [] Focal weakness  [] Sensory changes []Other:  Endocrine:  [] Polyuria  [] Polydipsia  [] Hair Loss  [] Other:  Lymphatic:   [] Swollen glands   Psychiatric:  As per HPI      All other systems negative except as marked or mentioned/indicated in the HPI. Sera Staggers      PHYSICAL EXAM:  Vitals:  /85   Pulse 71   Temp 97.6 °F (36.4 °C) (Oral)   Resp 18   Ht 5' 8\" (1.727 m)   Wt 153 lb (69.4 kg)   SpO2 97%   BMI 23.26 kg/m²      Neuro Exam:   Muscle Strength & Tone: full ROM  Gait: normal gait   Involuntary Movements: No    Mental Status Examination:    Level of consciousness:  within normal limits   Appearance:  in chair, fair grooming and fair hygiene  Behavior/Motor: No psychomotor agitation or retardation noted  Attitude toward examiner: Guarded  Speech: Normal rate rhythm volume and tone  Mood: depressed and sad  Affect:  blunted and flat  Thought processes:  linear and slow   Thought content: Without hallucinations delusions or paranoia  Cognition:  oriented to person, place, and time   Concentration poor  Memory intact  Insight poor  Judgement poor   Fund of Knowledge adequate      DIAGNOSIS:    Adjustment reaction with mixed disturbance in emotion and conduct  Dementia with behavioral disturbance      LABS: REVIEWED TODAY:  Recent Labs     05/17/20  1831   WBC 6.8   HGB 13.1        Recent Labs     05/17/20  1831 05/18/20  1720 05/19/20  0545    138 136   K 4.3 4.5 4.8    99 102   CO2 20* 25 19*   BUN 20 20 26*   CREATININE 0.9 1.0 0.9   GLUCOSE 120* 105* 92     Recent Labs     05/17/20  1831 05/18/20  1720 05/19/20  0545   BILITOT <0.2 <0.2 <0.2   ALKPHOS 63 61 64   AST 15 19 19   ALT 10 11 10     Lab Results   Component Value Date    LABAMPH NOT DETECTED 05/17/2020    BARBSCNU NOT DETECTED 05/17/2020    LABBENZ NOT DETECTED 05/17/2020 LABMETH NOT DETECTED 2020    OPIATESCREENURINE NOT DETECTED 2020    PHENCYCLIDINESCREENURINE NOT DETECTED 2020    ETOH <10 2020     Lab Results   Component Value Date    TSH 1.960 2017     No results found for: LITHIUM  No results found for: VALPROATE, CBMZ  No results found for: LITHIUM, VALPROATE    FURTHER LABS ORDERED :      Radiology   Ct Cervical Spine Wo Contrast    Result Date: 2020  Clinical indications: Pain status post trauma. COMPARISON: 2017. Exposure control: This examination and all examinations utilizing ionizing radiation at this facility done so according to the ALARA (as low as reasonably achievable) principal for radiation dose reduction. TECHNIQUE: Axial, sagittal, and coronal computed tomography of the cervical spine was performed without contrast. FINDINGS: These images reveal no evidence for acute displaced cortical disruption or spondylolisthesis. There is mild cervical kyphosis. There is diffuse loss of disc height, degenerative spondylosis, uncovertebral hypertrophy and facet arthropathy. These degenerative osseous changes result in multilevel central and foraminal stenosis. There are calcifications within the regional arteries. Otherwise regional soft tissue and osseous structures are unremarkable. No acute fracture or spondylolisthesis is identified on CT of cervical spine. Diffuse degenerative disc and facet disease result in multilevel central and foraminal stenosis. Cervical kyphosis which can be due to muscle spasm or positional. Atherosclerotic vascular disease. Xr Chest Portable    Result Date: 2020  Patient MRN: [id number] : 1970 Age:  44 years Gender: Female Order Date: 2020 2:45 PM Exam: XR CHEST PORTABLE Number of Images: 1 view Indication:   covid-19 covid-19 Comparison: None. Findings: The lungs are clear.   There is hyperaeration of lungs with flattening of both hemidiaphragms suggesting of early chronic obstructive pleural disease. There is no evidence of pulmonary infiltrate or pleural effusion. The pulmonary vascularity is unremarkable. The cardiac, hilar and mediastinal silhouettes are satisfactory. The bony thorax demonstrates no gross abnormality. NO ACUTE CARDIOPULMONARY PROCESS     Cta Neck W Contrast    Result Date: 5/17/2020  Clinical indications: Pain status post trauma. Exposure control: This examination and all examinations utilizing ionizing radiation at this facility done so according to the ALARA (as low as reasonably achievable) principal for radiation dose reduction. TECHNIQUE: Axial, sagittal, and coronal computed tomography of the head and neck was performed following intravenous administration of 100 mL of Isovue-370. 3-D post processing. Three-dimensional reconstructions of the arterial tree. MIP imaging. FINDINGS: The brain parenchyma within the skull base is unremarkable. Mildly prominent submandibular lymph nodes bilaterally without costa adenopathy. There is mild mucosal thickening within the paranasal sinuses but no fluid levels are evident. There are degenerative changes of the cervical spine. Lung apices show mild dependent atelectasis. There is no evidence for stenosis or aneurysm within the aortic arch, the innominate artery, subclavian arteries, the common carotid arteries, vertebral arteries, the internal carotid arteries, the external carotid arteries or their respective visualized branches. Essentially negative CTA of the neck. EKG: TRACING REVIEWED    RECOMMENDATIONS: It was discussed with my attending provider and the patient regarding medication changes which involved changing her Depakote DR 1000 mg a day just to Depakote  mg at bedtime, decreasing the patient's Invega from 9 mg daily to 6 mg at bedtime.   We will continue melatonin 5 mg at bedtime, will continue Exelon 9.5 mg patch, will discontinue Seroquel, will reduce the amount of Remeron to 7.5 mg at at bedtime. It is additionally recommended to minimize the use of benzodiazepines due to the rebound agitation affect an elderly and demented patients. Psychiatry will follow up with this patient in the next 24 to 48 hours to assess for any further assistance. Risk Management:  1:1 sitter    Medications: Discussed medication adjustments with the patient she is agreeable to this and gave consent to this. Discussed with the treating physician/ team about the patient and treatment plan  Reviewed the chart    Discussed with the patient risk, benefit, alternative and common side effects for the  proposed medication treatment. Patient is consenting to the treatment. Thanks for the consult. Please call me if needed.     Electronically signed by RON Layton CNP on 5/19/2020 at 2:29 PM

## 2020-05-19 NOTE — PLAN OF CARE
Problem: Suicide risk  Description: Suicide risk  Goal: Provide patient with safe environment  Description: Provide patient with safe environment  5/19/2020 0143 by Miki Adkins RN  Outcome: Ongoing  5/18/2020 1853 by Abram Klein RN  Outcome: Met This Shift     Problem: Falls - Risk of:  Goal: Will remain free from falls  Description: Will remain free from falls  5/19/2020 0143 by Miki Adkins RN  Outcome: Ongoing  5/18/2020 1853 by Abram Klein RN  Outcome: Met This Shift     Problem: Falls - Risk of:  Goal: Absence of physical injury  Description: Absence of physical injury  Outcome: Ongoing     Problem: Pain:  Description: Pain management should include both nonpharmacologic and pharmacologic interventions. Goal: Pain level will decrease  Description: Pain level will decrease  Outcome: Ongoing     Problem: Pain:  Description: Pain management should include both nonpharmacologic and pharmacologic interventions. Goal: Control of acute pain  Description: Control of acute pain  Outcome: Ongoing     Problem: Pain:  Description: Pain management should include both nonpharmacologic and pharmacologic interventions.   Goal: Control of chronic pain  Description: Control of chronic pain  Outcome: Ongoing

## 2020-05-19 NOTE — PROGRESS NOTES
Pt continues to state that wants to kill herself and wants to know why she can't die. Pt with no plan currently. Constant observation and suicide precautions maintained by RN. Will continue to monitor.

## 2020-05-19 NOTE — CARE COORDINATION
Met with nurse and patient at bedside to discuss transition of care. She is from [facility]. She is actively having thoughts of killing herself with no plan currently. CO at bedside. Suicide precautions maintained. She was pink slipped in ED ( 5/17/2020 @ 1730). They are following and plan to accept when medically stable and she tests neg for COVID. CM will continue to follow.  Any questions please call me @ [phone]

## 2020-05-19 NOTE — PROGRESS NOTES
Hospitalist Progress Note      PCP: Queenie Fraire MD    Date of Admission: 5/17/2020    Chief Complaint: suicide attempt    Hospital Course:   44 y.o. female history of CVA, GERD, bipolar who was brought in from nursing home  Due to suicide attempt. The patient lives in a single room at nursing home. She has been staying at a nursing home after she had a near fatal overdose attempted Wellbutrin and possible anoxic brain injury. She has been having suicidal ideation. Has history of attempted suicide. She recently lost her 59-year-old daughter from drug overdose. She has been mourning her daughter. Decided to take her life. She grabbed onto cord in her room and attempted to strangle herself. ER, lab work was unremarkable. Urine drug tox screen was negative. CTA neck with contrast was unremarkable. COVID-19 testing was positive. She was seen by psychiatry consult but cannot be admitted due to COVID-19 positive testing. 5/19/20: vital signs stable, maintaining oxygen saturation without supplemental oxygen. Will admit to psych when covid negative. Subjective:   Patient sitting up in bed. She is very tearful, stating she wants to die, does not want to live. She is complaining of pain in her feet that began today.      Medications:  Reviewed    Infusion Medications   Scheduled Medications    amLODIPine  10 mg Oral Daily    atenolol  25 mg Oral Daily    cetirizine  10 mg Oral Daily    conjugated estrogens  0.5 g Vaginal Q72H    divalproex  1,000 mg Oral Nightly    famotidine  20 mg Oral BID    ferrous sulfate  325 mg Oral Daily with breakfast    folic acid  1 mg Oral Daily    lactulose  20 g Oral TID    mirabegron  50 mg Oral Daily    mirtazapine  15 mg Oral Nightly    nicotine  1 patch Transdermal Q24H    nitrofurantoin (macrocrystal-monohydrate)  100 mg Oral Daily    paliperidone  9 mg Oral QAM    QUEtiapine  100 mg Oral Nightly    rivastigmine  1 patch Transdermal Daily    sucralfate  1 g Oral BID    sodium chloride flush  10 mL Intravenous 2 times per day    enoxaparin  30 mg Subcutaneous BID     PRN Meds: acetaminophen, bisacodyl, LORazepam, magnesium hydroxide, melatonin, uribel, sodium chloride flush, promethazine **OR** ondansetron      Intake/Output Summary (Last 24 hours) at 5/19/2020 0942  Last data filed at 5/19/2020 0934  Gross per 24 hour   Intake 290 ml   Output 375 ml   Net -85 ml       Exam:    /85   Pulse 71   Temp 97.6 °F (36.4 °C) (Oral)   Resp 18   Ht 5' 8\" (1.727 m)   Wt 153 lb (69.4 kg)   SpO2 97%   BMI 23.26 kg/m²     General appearance: No apparent distress, appears stated age and cooperative. HEENT: Pupils equal, round, and reactive to light. Conjunctivae/corneas clear. Neck: Supple, with full range of motion. No jugular venous distention. Trachea midline. Respiratory:  Normal respiratory effort. Clear to auscultation, bilaterally without Rales/Wheezes/Rhonchi. Cardiovascular: Regular rate and rhythm with normal S1/S2 without murmurs, rubs or gallops. Abdomen: Soft, non-tender, non-distended with normal bowel sounds. Musculoskeletal: No clubbing, cyanosis or edema bilaterally. Full range of motion without deformity. Skin: Skin color, texture, turgor normal.  No rashes or lesions. Neurologic:  Neurovascularly intact without any focal sensory/motor deficits.    Psychiatric: Alert and oriented  Capillary Refill: Brisk,< 3 seconds   Peripheral Pulses: +2 palpable, equal bilaterally       Labs:   Recent Labs     05/17/20  1831   WBC 6.8   HGB 13.1   HCT 39.9        Recent Labs     05/17/20  1831 05/18/20  1720 05/19/20  0545    138 136   K 4.3 4.5 4.8    99 102   CO2 20* 25 19*   BUN 20 20 26*   CREATININE 0.9 1.0 0.9   CALCIUM 8.9 9.4 9.2     Recent Labs     05/17/20  1831 05/18/20  1720 05/19/20  0545   AST 15 19 19   ALT 10 11 10   BILITOT <0.2 <0.2 <0.2   ALKPHOS 63 61 64     Recent Labs     05/18/20  1720 05/19/20  0545   INR 1.0 0.9 No results for input(s): Bari Chavis in the last 72 hours. Assessment/Plan:    Active Hospital Problems    Diagnosis Date Noted    Suicide attempt [facility]) Ana Pineda 05/19/2020    COVID-19 virus infection [U07.1] 05/18/2020     Plan  Consult psych  Constant observation  Monitor labs  Medications as ordered, changes per psych  Neurovascular checks  I/Os  Supplemental oxygen PRN  aquaphor for feet and hands    DVT Prophylaxis: lovenox  Diet: DIET GENERAL; Safety Tray; Safety Tray (Disposables)  Code Status: Full Code    PT/OT Eval Status: n/a    Dispo - admit telemetry, transfer to psych when covid negative.      Sylvia Cuevas, CNP

## 2020-05-20 LAB
ALBUMIN SERPL-MCNC: 3.8 G/DL (ref 3.5–5.2)
ALP BLD-CCNC: 50 U/L (ref 35–104)
ALT SERPL-CCNC: 9 U/L (ref 0–32)
ANION GAP SERPL CALCULATED.3IONS-SCNC: 15 MMOL/L (ref 7–16)
APTT: 21.5 SEC (ref 24.5–35.1)
AST SERPL-CCNC: 15 U/L (ref 0–31)
BILIRUB SERPL-MCNC: 0.2 MG/DL (ref 0–1.2)
BUN BLDV-MCNC: 25 MG/DL (ref 6–20)
CALCIUM SERPL-MCNC: 8.7 MG/DL (ref 8.6–10.2)
CHLORIDE BLD-SCNC: 102 MMOL/L (ref 98–107)
CO2: 22 MMOL/L (ref 22–29)
CREAT SERPL-MCNC: 0.8 MG/DL (ref 0.5–1)
D DIMER: <200 NG/ML DDU
FERRITIN: 108 NG/ML
FIBRINOGEN: 431 MG/DL (ref 225–540)
GFR AFRICAN AMERICAN: >60
GFR NON-AFRICAN AMERICAN: >60 ML/MIN/1.73
GLUCOSE BLD-MCNC: 87 MG/DL (ref 74–99)
INR BLD: 1
POTASSIUM REFLEX MAGNESIUM: 4.2 MMOL/L (ref 3.5–5)
PROTHROMBIN TIME: 10.8 SEC (ref 9.3–12.4)
SODIUM BLD-SCNC: 139 MMOL/L (ref 132–146)
TOTAL PROTEIN: 6.6 G/DL (ref 6.4–8.3)

## 2020-05-20 PROCEDURE — 80053 COMPREHEN METABOLIC PANEL: CPT

## 2020-05-20 PROCEDURE — 6370000000 HC RX 637 (ALT 250 FOR IP): Performed by: CLINICAL NURSE SPECIALIST

## 2020-05-20 PROCEDURE — 82728 ASSAY OF FERRITIN: CPT

## 2020-05-20 PROCEDURE — 6370000000 HC RX 637 (ALT 250 FOR IP): Performed by: INTERNAL MEDICINE

## 2020-05-20 PROCEDURE — 6360000002 HC RX W HCPCS: Performed by: INTERNAL MEDICINE

## 2020-05-20 PROCEDURE — 85730 THROMBOPLASTIN TIME PARTIAL: CPT

## 2020-05-20 PROCEDURE — 85384 FIBRINOGEN ACTIVITY: CPT

## 2020-05-20 PROCEDURE — 85610 PROTHROMBIN TIME: CPT

## 2020-05-20 PROCEDURE — 6370000000 HC RX 637 (ALT 250 FOR IP): Performed by: NURSE PRACTITIONER

## 2020-05-20 PROCEDURE — 1200000000 HC SEMI PRIVATE

## 2020-05-20 PROCEDURE — 85378 FIBRIN DEGRADE SEMIQUANT: CPT

## 2020-05-20 PROCEDURE — 36415 COLL VENOUS BLD VENIPUNCTURE: CPT

## 2020-05-20 PROCEDURE — 2580000003 HC RX 258: Performed by: INTERNAL MEDICINE

## 2020-05-20 RX ORDER — GABAPENTIN 100 MG/1
100 CAPSULE ORAL 3 TIMES DAILY
Status: DISCONTINUED | OUTPATIENT
Start: 2020-05-20 | End: 2020-05-22 | Stop reason: HOSPADM

## 2020-05-20 RX ADMIN — Medication 10 ML: at 08:26

## 2020-05-20 RX ADMIN — LACTULOSE 20 G: 20 SOLUTION ORAL at 08:24

## 2020-05-20 RX ADMIN — FAMOTIDINE 20 MG: 20 TABLET ORAL at 08:24

## 2020-05-20 RX ADMIN — GABAPENTIN 100 MG: 100 CAPSULE ORAL at 21:12

## 2020-05-20 RX ADMIN — ATENOLOL 25 MG: 25 TABLET ORAL at 08:24

## 2020-05-20 RX ADMIN — ACETAMINOPHEN 650 MG: 325 TABLET ORAL at 16:59

## 2020-05-20 RX ADMIN — MIRTAZAPINE 7.5 MG: 15 TABLET, FILM COATED ORAL at 21:12

## 2020-05-20 RX ADMIN — CETIRIZINE HYDROCHLORIDE 10 MG: 10 TABLET, FILM COATED ORAL at 08:25

## 2020-05-20 RX ADMIN — NITROFURANTOIN MONOHYDRATE/MACROCRYSTALLINE 100 MG: 25; 75 CAPSULE ORAL at 08:25

## 2020-05-20 RX ADMIN — FAMOTIDINE 20 MG: 20 TABLET ORAL at 21:12

## 2020-05-20 RX ADMIN — GABAPENTIN 100 MG: 100 CAPSULE ORAL at 14:22

## 2020-05-20 RX ADMIN — AMLODIPINE BESYLATE 10 MG: 10 TABLET ORAL at 08:25

## 2020-05-20 RX ADMIN — LORAZEPAM 1 MG: 1 TABLET ORAL at 17:00

## 2020-05-20 RX ADMIN — ACETAMINOPHEN 650 MG: 325 TABLET ORAL at 10:38

## 2020-05-20 RX ADMIN — Medication 10 ML: at 21:13

## 2020-05-20 RX ADMIN — SUCRALFATE 1 G: 1 TABLET ORAL at 08:23

## 2020-05-20 RX ADMIN — DIVALPROEX SODIUM 500 MG: 500 TABLET, EXTENDED RELEASE ORAL at 21:11

## 2020-05-20 RX ADMIN — SUCRALFATE 1 G: 1 TABLET ORAL at 21:12

## 2020-05-20 RX ADMIN — FERROUS SULFATE TAB 325 MG (65 MG ELEMENTAL FE) 325 MG: 325 (65 FE) TAB at 08:24

## 2020-05-20 RX ADMIN — LORAZEPAM 1 MG: 1 TABLET ORAL at 10:54

## 2020-05-20 RX ADMIN — ENOXAPARIN SODIUM 30 MG: 30 INJECTION, SOLUTION INTRAVENOUS; SUBCUTANEOUS at 08:23

## 2020-05-20 RX ADMIN — LACTULOSE 20 G: 20 SOLUTION ORAL at 14:22

## 2020-05-20 RX ADMIN — FOLIC ACID 1 MG: 1 TABLET ORAL at 08:25

## 2020-05-20 RX ADMIN — ENOXAPARIN SODIUM 30 MG: 30 INJECTION, SOLUTION INTRAVENOUS; SUBCUTANEOUS at 21:13

## 2020-05-20 RX ADMIN — Medication 5 MG: at 21:12

## 2020-05-20 ASSESSMENT — PAIN DESCRIPTION - PAIN TYPE
TYPE: CHRONIC PAIN

## 2020-05-20 ASSESSMENT — PAIN SCALES - GENERAL
PAINLEVEL_OUTOF10: 8
PAINLEVEL_OUTOF10: 8
PAINLEVEL_OUTOF10: 5
PAINLEVEL_OUTOF10: 0
PAINLEVEL_OUTOF10: 0
PAINLEVEL_OUTOF10: 6
PAINLEVEL_OUTOF10: 4
PAINLEVEL_OUTOF10: 5
PAINLEVEL_OUTOF10: 0

## 2020-05-20 ASSESSMENT — PAIN DESCRIPTION - FREQUENCY
FREQUENCY: CONTINUOUS
FREQUENCY: CONTINUOUS

## 2020-05-20 ASSESSMENT — PAIN DESCRIPTION - LOCATION
LOCATION: FOOT;HEAD

## 2020-05-20 ASSESSMENT — PAIN DESCRIPTION - DESCRIPTORS
DESCRIPTORS: ACHING;CONSTANT
DESCRIPTORS: ACHING;CONSTANT;DISCOMFORT
DESCRIPTORS: ACHING;CONSTANT;DISCOMFORT
DESCRIPTORS: DISCOMFORT;CONSTANT;ACHING

## 2020-05-20 ASSESSMENT — PAIN DESCRIPTION - ONSET: ONSET: ON-GOING

## 2020-05-20 ASSESSMENT — PAIN DESCRIPTION - PROGRESSION: CLINICAL_PROGRESSION: NOT CHANGED

## 2020-05-20 ASSESSMENT — PAIN - FUNCTIONAL ASSESSMENT: PAIN_FUNCTIONAL_ASSESSMENT: ACTIVITIES ARE NOT PREVENTED

## 2020-05-20 NOTE — CARE COORDINATION
Pink slip will  at the end of the the business day today. Spoke with TEVIN on behavioral health. She will discussed with psych NP today. If they decide to detain patient they will have psych complete affidavit of mental illness. Patient will not be a candidate for COVID retesting until at least tomorrow. Patient cannot go to psych until she recieves 2 COVID negatives. CO continues. Spoke with Iggy ABARCA. Psych will be down to reassess today, if they feel she needs detained through probate they will complete the paperwork and work with probate court directly. Patient signed voluntary consent. Voluntary in soft chart and faxed to [facility]. Psych will treat patient as if she is inpatient psych starting today and see her daily. Patient is not able to sign out AMA now that she has signed voluntary until cleared by psych.

## 2020-05-20 NOTE — PLAN OF CARE
Problem: Suicide risk  Goal: Provide patient with safe environment  Description: Provide patient with safe environment  5/20/2020 1445 by Roxanna May RN  Outcome: Ongoing  5/20/2020 0246 by Poly Ley RN  Outcome: Ongoing     Problem: Falls - Risk of:  Goal: Will remain free from falls  Description: Will remain free from falls  5/20/2020 1445 by Roxanna May RN  Outcome: Ongoing  5/20/2020 0246 by Poly Ley RN  Outcome: Ongoing  Goal: Absence of physical injury  Description: Absence of physical injury  5/20/2020 1445 by Roxanna May RN  Outcome: Ongoing  5/20/2020 0246 by Poly Ley RN  Outcome: Ongoing     Problem: Pain:  Goal: Pain level will decrease  Description: Pain level will decrease  5/20/2020 1445 by Roxanna May RN  Outcome: Ongoing  5/20/2020 0246 by Poly Ley RN  Outcome: Ongoing  Goal: Control of acute pain  Description: Control of acute pain  5/20/2020 1445 by Roxanna May RN  Outcome: Ongoing  5/20/2020 0246 by Poly Ley RN  Outcome: Ongoing  Goal: Control of chronic pain  Description: Control of chronic pain  5/20/2020 1445 by Roxanna May RN  Outcome: Ongoing  5/20/2020 0246 by Poly Ley RN  Outcome: Ongoing

## 2020-05-20 NOTE — PROGRESS NOTES
I spoke with [facility] the pharmacist and she stated the Myrbetriq will need to be ordered and is unavailable at this time . [facility] states she will see about ordering it . [facility] called me back and she stated unable to order this medication . I will ask  If he wants something else when he rounds .

## 2020-05-20 NOTE — PROGRESS NOTES
[id number] [facility] [address] sent to [facility] that per  Verta Blizzard unable to do a Covid test today due to needs to be done in 72 hours after the first one was done .

## 2020-05-20 NOTE — PROGRESS NOTES
Minor agitation with crying at times controlled with as needed Ativan . All meds taken without difficulty . Did not observe any suicidal ideations during my shift and documenting per protocol .

## 2020-05-20 NOTE — PROGRESS NOTES
Hospitalist Progress Note      PCP: Frederic Henderson MD    Date of Admission: 5/17/2020    Chief Complaint: suicide attempt    Hospital Course:   44 y.o. female history of CVA, GERD, bipolar who was brought in from nursing home  Due to suicide attempt. The patient lives in a single room at nursing home. She has been staying at a nursing home after she had a near fatal overdose attempted Wellbutrin and possible anoxic brain injury. She has been having suicidal ideation. Has history of attempted suicide. She recently lost her 70-year-old daughter from drug overdose. She has been mourning her daughter. Decided to take her life. She grabbed onto cord in her room and attempted to strangle herself. ER, lab work was unremarkable. Urine drug tox screen was negative. CTA neck with contrast was unremarkable. COVID-19 testing was positive. She was seen by psychiatry consult but cannot be admitted due to COVID-19 positive testing. 5/19/20: vital signs stable, maintaining oxygen saturation without supplemental oxygen. Will admit to psych when covid negative. 5/20/20: vital signs stable. Labs stable. Maintaining oxygen saturation on no supplemental oxygen. Subjective:   Patient sitting up in bed. States she is having a headache, no vision changes. Denies fever or chest pain.      Medications:  Reviewed    Infusion Medications   Scheduled Medications    mirtazapine  7.5 mg Oral Nightly    divalproex  500 mg Oral Nightly    melatonin  5 mg Oral Nightly    amLODIPine  10 mg Oral Daily    atenolol  25 mg Oral Daily    cetirizine  10 mg Oral Daily    conjugated estrogens  0.5 g Vaginal Q72H    famotidine  20 mg Oral BID    ferrous sulfate  325 mg Oral Daily with breakfast    folic acid  1 mg Oral Daily    lactulose  20 g Oral TID    mirabegron  50 mg Oral Daily    nicotine  1 patch Transdermal Q24H    nitrofurantoin (macrocrystal-monohydrate)  100 mg Oral Daily    rivastigmine  1 patch Transdermal Daily  sucralfate  1 g Oral BID    sodium chloride flush  10 mL Intravenous 2 times per day    enoxaparin  30 mg Subcutaneous BID     PRN Meds: mineral oil-hydrophilic petrolatum, acetaminophen, bisacodyl, LORazepam, magnesium hydroxide, melatonin, uribel, sodium chloride flush, promethazine **OR** ondansetron      Intake/Output Summary (Last 24 hours) at 5/20/2020 0825  Last data filed at 5/19/2020 2330  Gross per 24 hour   Intake 1020 ml   Output 465 ml   Net 555 ml       Exam:    /70   Pulse 76   Temp 98 °F (36.7 °C) (Oral)   Resp 18   Ht 5' 8\" (1.727 m)   Wt 151 lb (68.5 kg)   SpO2 95%   BMI 22.96 kg/m²     General appearance: No apparent distress, appears stated age and cooperative. HEENT: Pupils equal, round, and reactive to light. Conjunctivae/corneas clear. Neck: Supple, with full range of motion. No jugular venous distention. Trachea midline. Respiratory:  Normal respiratory effort. Clear to auscultation, bilaterally without Rales/Wheezes/Rhonchi. Cardiovascular: Regular rate and rhythm with normal S1/S2 without murmurs, rubs or gallops. Abdomen: Soft, non-tender, non-distended with normal bowel sounds. Musculoskeletal: No clubbing, cyanosis or edema bilaterally. Full range of motion without deformity. Skin: Skin color, texture, turgor normal.  No rashes or lesions. Neurologic:  Neurovascularly intact without any focal sensory/motor deficits.    Psychiatric: Alert and oriented  Capillary Refill: Brisk,< 3 seconds   Peripheral Pulses: +2 palpable, equal bilaterally       Labs:   Recent Labs     05/17/20  1831   WBC 6.8   HGB 13.1   HCT 39.9        Recent Labs     05/18/20  1720 05/19/20  0545 05/20/20  0615    136 139   K 4.5 4.8 4.2   CL 99 102 102   CO2 25 19* 22   BUN 20 26* 25*   CREATININE 1.0 0.9 0.8   CALCIUM 9.4 9.2 8.7     Recent Labs     05/18/20  1720 05/19/20  0545 05/20/20  0615   AST 19 19 15   ALT 11 10 9   BILITOT <0.2 <0.2 0.2   ALKPHOS 61 64 50     Recent Labs     05/18/20  1720 05/19/20  0545 05/20/20  0615   INR 1.0 0.9 1.0     No results for input(s): Bari Chavis in the last 72 hours. 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      Sylvia Cuevas, CNP

## 2020-05-20 NOTE — PLAN OF CARE
Problem: Suicide risk  Goal: Provide patient with safe environment  Outcome: Ongoing     Problem: Falls - Risk of:  Goal: Will remain free from falls  Outcome: Ongoing     Problem: Falls - Risk of:  Goal: Absence of physical injury  Outcome: Ongoing     Problem: Pain:  Goal: Pain level will decrease  Outcome: Ongoing     Problem: Pain:  Goal: Control of acute pain  Outcome: Ongoing     Problem: Pain:  Goal: Control of chronic pain  Outcome: Ongoing

## 2020-05-21 LAB
ALBUMIN SERPL-MCNC: 4.1 G/DL (ref 3.5–5.2)
ALP BLD-CCNC: 50 U/L (ref 35–104)
ALT SERPL-CCNC: 12 U/L (ref 0–32)
ANION GAP SERPL CALCULATED.3IONS-SCNC: 15 MMOL/L (ref 7–16)
APTT: 33.9 SEC (ref 24.5–35.1)
AST SERPL-CCNC: 18 U/L (ref 0–31)
BILIRUB SERPL-MCNC: <0.2 MG/DL (ref 0–1.2)
BUN BLDV-MCNC: 19 MG/DL (ref 6–20)
CALCIUM SERPL-MCNC: 8.9 MG/DL (ref 8.6–10.2)
CHLORIDE BLD-SCNC: 106 MMOL/L (ref 98–107)
CO2: 23 MMOL/L (ref 22–29)
CREAT SERPL-MCNC: 0.8 MG/DL (ref 0.5–1)
D DIMER: <200 NG/ML DDU
FERRITIN: 111 NG/ML
FIBRINOGEN: 435 MG/DL (ref 225–540)
GFR AFRICAN AMERICAN: >60
GFR NON-AFRICAN AMERICAN: >60 ML/MIN/1.73
GLUCOSE BLD-MCNC: 74 MG/DL (ref 74–99)
HCT VFR BLD CALC: 41.6 % (ref 34–48)
HEMOGLOBIN: 13.9 G/DL (ref 11.5–15.5)
INR BLD: 1
MCH RBC QN AUTO: 33.6 PG (ref 26–35)
MCHC RBC AUTO-ENTMCNC: 33.4 % (ref 32–34.5)
MCV RBC AUTO: 100.5 FL (ref 80–99.9)
PDW BLD-RTO: 11.9 FL (ref 11.5–15)
PLATELET # BLD: 171 E9/L (ref 130–450)
PMV BLD AUTO: 11 FL (ref 7–12)
POTASSIUM REFLEX MAGNESIUM: 4.9 MMOL/L (ref 3.5–5)
PROTHROMBIN TIME: 11.1 SEC (ref 9.3–12.4)
RBC # BLD: 4.14 E12/L (ref 3.5–5.5)
SARS-COV-2, NAAT: NOT DETECTED
SODIUM BLD-SCNC: 144 MMOL/L (ref 132–146)
TOTAL PROTEIN: 6.9 G/DL (ref 6.4–8.3)
WBC # BLD: 7.4 E9/L (ref 4.5–11.5)

## 2020-05-21 PROCEDURE — 2580000003 HC RX 258: Performed by: INTERNAL MEDICINE

## 2020-05-21 PROCEDURE — 6360000002 HC RX W HCPCS: Performed by: INTERNAL MEDICINE

## 2020-05-21 PROCEDURE — 6370000000 HC RX 637 (ALT 250 FOR IP): Performed by: INTERNAL MEDICINE

## 2020-05-21 PROCEDURE — 85378 FIBRIN DEGRADE SEMIQUANT: CPT

## 2020-05-21 PROCEDURE — U0002 COVID-19 LAB TEST NON-CDC: HCPCS

## 2020-05-21 PROCEDURE — 6370000000 HC RX 637 (ALT 250 FOR IP): Performed by: NURSE PRACTITIONER

## 2020-05-21 PROCEDURE — 2140000000 HC CCU INTERMEDIATE R&B

## 2020-05-21 PROCEDURE — 6370000000 HC RX 637 (ALT 250 FOR IP): Performed by: CLINICAL NURSE SPECIALIST

## 2020-05-21 PROCEDURE — 85730 THROMBOPLASTIN TIME PARTIAL: CPT

## 2020-05-21 PROCEDURE — 36415 COLL VENOUS BLD VENIPUNCTURE: CPT

## 2020-05-21 PROCEDURE — 80053 COMPREHEN METABOLIC PANEL: CPT

## 2020-05-21 PROCEDURE — 99232 SBSQ HOSP IP/OBS MODERATE 35: CPT | Performed by: NURSE PRACTITIONER

## 2020-05-21 PROCEDURE — 85384 FIBRINOGEN ACTIVITY: CPT

## 2020-05-21 PROCEDURE — 85027 COMPLETE CBC AUTOMATED: CPT

## 2020-05-21 PROCEDURE — 85610 PROTHROMBIN TIME: CPT

## 2020-05-21 PROCEDURE — 82728 ASSAY OF FERRITIN: CPT

## 2020-05-21 RX ADMIN — Medication 5 MG: at 22:22

## 2020-05-21 RX ADMIN — LORAZEPAM 1 MG: 1 TABLET ORAL at 13:00

## 2020-05-21 RX ADMIN — GABAPENTIN 100 MG: 100 CAPSULE ORAL at 13:00

## 2020-05-21 RX ADMIN — CETIRIZINE HYDROCHLORIDE 10 MG: 10 TABLET, FILM COATED ORAL at 08:25

## 2020-05-21 RX ADMIN — LORAZEPAM 1 MG: 1 TABLET ORAL at 08:24

## 2020-05-21 RX ADMIN — AMLODIPINE BESYLATE 10 MG: 10 TABLET ORAL at 08:25

## 2020-05-21 RX ADMIN — Medication 5 MG: at 01:47

## 2020-05-21 RX ADMIN — CONJUGATED ESTROGENS 0.5 G: 0.62 CREAM VAGINAL at 22:22

## 2020-05-21 RX ADMIN — ACETAMINOPHEN 650 MG: 325 TABLET ORAL at 21:17

## 2020-05-21 RX ADMIN — Medication 10 ML: at 21:20

## 2020-05-21 RX ADMIN — LORAZEPAM 1 MG: 1 TABLET ORAL at 21:17

## 2020-05-21 RX ADMIN — FERROUS SULFATE TAB 325 MG (65 MG ELEMENTAL FE) 325 MG: 325 (65 FE) TAB at 08:24

## 2020-05-21 RX ADMIN — FOLIC ACID 1 MG: 1 TABLET ORAL at 08:23

## 2020-05-21 RX ADMIN — MIRTAZAPINE 7.5 MG: 15 TABLET, FILM COATED ORAL at 21:19

## 2020-05-21 RX ADMIN — FAMOTIDINE 20 MG: 20 TABLET ORAL at 08:24

## 2020-05-21 RX ADMIN — Medication 10 ML: at 08:30

## 2020-05-21 RX ADMIN — NITROFURANTOIN MONOHYDRATE/MACROCRYSTALLINE 100 MG: 25; 75 CAPSULE ORAL at 08:26

## 2020-05-21 RX ADMIN — GABAPENTIN 100 MG: 100 CAPSULE ORAL at 08:23

## 2020-05-21 RX ADMIN — ACETAMINOPHEN 650 MG: 325 TABLET ORAL at 13:29

## 2020-05-21 RX ADMIN — ENOXAPARIN SODIUM 30 MG: 30 INJECTION, SOLUTION INTRAVENOUS; SUBCUTANEOUS at 08:26

## 2020-05-21 RX ADMIN — FAMOTIDINE 20 MG: 20 TABLET ORAL at 21:17

## 2020-05-21 RX ADMIN — DIVALPROEX SODIUM 500 MG: 500 TABLET, EXTENDED RELEASE ORAL at 21:17

## 2020-05-21 RX ADMIN — GABAPENTIN 100 MG: 100 CAPSULE ORAL at 21:17

## 2020-05-21 RX ADMIN — SERTRALINE 25 MG: 50 TABLET, FILM COATED ORAL at 13:26

## 2020-05-21 RX ADMIN — ENOXAPARIN SODIUM 30 MG: 30 INJECTION, SOLUTION INTRAVENOUS; SUBCUTANEOUS at 21:21

## 2020-05-21 RX ADMIN — SUCRALFATE 1 G: 1 TABLET ORAL at 21:17

## 2020-05-21 RX ADMIN — ATENOLOL 25 MG: 25 TABLET ORAL at 08:26

## 2020-05-21 RX ADMIN — LACTULOSE 20 G: 20 SOLUTION ORAL at 08:26

## 2020-05-21 RX ADMIN — LACTULOSE 20 G: 20 SOLUTION ORAL at 13:01

## 2020-05-21 RX ADMIN — SUCRALFATE 1 G: 1 TABLET ORAL at 08:30

## 2020-05-21 RX ADMIN — PETROLATUM: 42 OINTMENT TOPICAL at 13:30

## 2020-05-21 ASSESSMENT — SLEEP AND FATIGUE QUESTIONNAIRES
DO YOU USE A SLEEP AID: NO
DO YOU HAVE DIFFICULTY SLEEPING: NO

## 2020-05-21 ASSESSMENT — PAIN DESCRIPTION - LOCATION
LOCATION: FOOT
LOCATION: HEAD
LOCATION: FOOT
LOCATION: FOOT

## 2020-05-21 ASSESSMENT — PAIN DESCRIPTION - DESCRIPTORS
DESCRIPTORS: CONSTANT;DISCOMFORT
DESCRIPTORS: CONSTANT;DISCOMFORT
DESCRIPTORS: HEADACHE

## 2020-05-21 ASSESSMENT — PAIN SCALES - GENERAL
PAINLEVEL_OUTOF10: 7
PAINLEVEL_OUTOF10: 0
PAINLEVEL_OUTOF10: 6
PAINLEVEL_OUTOF10: 0
PAINLEVEL_OUTOF10: 4
PAINLEVEL_OUTOF10: 4
PAINLEVEL_OUTOF10: 7

## 2020-05-21 ASSESSMENT — PAIN - FUNCTIONAL ASSESSMENT
PAIN_FUNCTIONAL_ASSESSMENT: PREVENTS OR INTERFERES SOME ACTIVE ACTIVITIES AND ADLS
PAIN_FUNCTIONAL_ASSESSMENT: PREVENTS OR INTERFERES WITH MANY ACTIVE NOT PASSIVE ACTIVITIES

## 2020-05-21 ASSESSMENT — PATIENT HEALTH QUESTIONNAIRE - PHQ9: SUM OF ALL RESPONSES TO PHQ QUESTIONS 1-9: 26

## 2020-05-21 ASSESSMENT — PAIN DESCRIPTION - PAIN TYPE
TYPE: CHRONIC PAIN
TYPE: ACUTE PAIN
TYPE: CHRONIC PAIN
TYPE: CHRONIC PAIN

## 2020-05-21 ASSESSMENT — PAIN DESCRIPTION - ONSET
ONSET: ON-GOING
ONSET: ON-GOING

## 2020-05-21 ASSESSMENT — PAIN DESCRIPTION - ORIENTATION
ORIENTATION: LEFT
ORIENTATION: LEFT

## 2020-05-21 ASSESSMENT — LIFESTYLE VARIABLES: HISTORY_ALCOHOL_USE: NO

## 2020-05-21 ASSESSMENT — PAIN DESCRIPTION - FREQUENCY
FREQUENCY: CONTINUOUS
FREQUENCY: CONTINUOUS

## 2020-05-21 ASSESSMENT — PAIN DESCRIPTION - PROGRESSION
CLINICAL_PROGRESSION: GRADUALLY WORSENING
CLINICAL_PROGRESSION: GRADUALLY IMPROVING
CLINICAL_PROGRESSION: NOT CHANGED

## 2020-05-21 NOTE — PROGRESS NOTES
Patient states she is not suicidal. She is calm and watching television. She was a little tearful  When she was brushing her teeth.

## 2020-05-21 NOTE — CARE COORDINATION
Biopsychosocial Assessment Note    Social work met with patient to complete the biopsychosocial assessment and CSSR-S. Pt was cooperative but tearful throughout the assessment. SW completed this assessment by telephone. Mental Status Exam: Pt was alert and oriented x4 but had challenges recalling why she was in the hospital on a medical floor    Chief Complaint: Pt reports she attempted SI after finding out her daughter passed aware from a drug overdose    Patient Report: SW spoke to pt, pt reports she has been feeling increasingly depressed since the loss of her daughter. Pt reports she resides at [facility]. Pt reports she was at [facility] and reported she just wanted to end her life due to the loss of her daughter. Pt reports now she is feeling increasingly depressed. Pt denies SI, denies HI, and denies hallucinations. Pt reports she has a  who is supportive and is there for her.      Gender  [] Male [x] Female [] Transgender  [] Other    Sexual Orientation    [x] Heterosexual [] Homosexual [] Bisexual [] Other    Suicidal Ideation  [] Reports [x] Denies    Homicidal Ideation  [] Reports [x] Denies      Hallucinations/Delusions (Specify type)  [] Reports [x] Denies     Substance Use/Alcohol Use/Addiction  [] Reports [x] Denies     Trauma History  [] Reports [x] Denies       Follow up provider: Jaskaran Bunch to follow as requested by NP    Plan for discharge (where they live can they return): Pt reports her plan is to return to [facility]

## 2020-05-21 NOTE — PROGRESS NOTES
Hospitalist Progress Note      PCP: Deborah Henderson MD    Date of Admission: 5/17/2020    Chief Complaint: suicide attempt    Hospital Course:   44 y.o. female history of CVA, GERD, bipolar who was brought in from nursing home  Due to suicide attempt. The patient lives in a single room at nursing home. She has been staying at a nursing home after she had a near fatal overdose attempted Wellbutrin and possible anoxic brain injury. She has been having suicidal ideation. Has history of attempted suicide. She recently lost her 35-year-old daughter from drug overdose. She has been mourning her daughter. Decided to take her life. She grabbed onto cord in her room and attempted to strangle herself. ER, lab work was unremarkable. Urine drug tox screen was negative. CTA neck with contrast was unremarkable. COVID-19 testing was positive. She was seen by psychiatry consult but cannot be admitted due to COVID-19 positive testing. 5/19/20: vital signs stable, maintaining oxygen saturation without supplemental oxygen. Will admit to psych when covid negative. 5/20/20: vital signs stable. Labs stable. Maintaining oxygen saturation on no supplemental oxygen. 5/21/20: patient continues to maintain oxygen saturations on no supplemental oxygen. She is remains afebrile. Started on gabapentin for neuropathic pain in her feet. covid to be repeated today    Subjective:   Patient sitting up in bed. She is coloring on her pages provided from [employer]. She appears much more relaxed today and did smile twice in our conversation.      Medications:  Reviewed    Infusion Medications   Scheduled Medications    gabapentin  100 mg Oral TID    mirtazapine  7.5 mg Oral Nightly    divalproex  500 mg Oral Nightly    melatonin  5 mg Oral Nightly    amLODIPine  10 mg Oral Daily    atenolol  25 mg Oral Daily    cetirizine  10 mg Oral Daily    conjugated estrogens  0.5 g Vaginal Q72H    famotidine  20 mg Oral BID    ferrous sulfate  325 mg Oral Daily with breakfast    folic acid  1 mg Oral Daily    lactulose  20 g Oral TID    nicotine  1 patch Transdermal Q24H    nitrofurantoin (macrocrystal-monohydrate)  100 mg Oral Daily    rivastigmine  1 patch Transdermal Daily    sucralfate  1 g Oral BID    sodium chloride flush  10 mL Intravenous 2 times per day    enoxaparin  30 mg Subcutaneous BID     PRN Meds: mineral oil-hydrophilic petrolatum, acetaminophen, bisacodyl, LORazepam, magnesium hydroxide, melatonin, sodium chloride flush, promethazine **OR** ondansetron      Intake/Output Summary (Last 24 hours) at 5/21/2020 0836  Last data filed at 5/21/2020 0545  Gross per 24 hour   Intake --   Output 300 ml   Net -300 ml       Exam:    /70   Pulse 54   Temp 98.1 °F (36.7 °C)   Resp 18   Ht 5' 8\" (1.727 m)   Wt 151 lb (68.5 kg)   SpO2 97%   BMI 22.96 kg/m²     General appearance: No apparent distress, appears stated age and cooperative. HEENT: Pupils equal, round, and reactive to light. Conjunctivae/corneas clear. Neck: Supple, with full range of motion. No jugular venous distention. Trachea midline. Respiratory:  Normal respiratory effort. Clear to auscultation, bilaterally without Rales/Wheezes/Rhonchi. Cardiovascular: Regular rate and rhythm with normal S1/S2 without murmurs, rubs or gallops. Abdomen: Soft, non-tender, non-distended with normal bowel sounds. Musculoskeletal: No clubbing, cyanosis or edema bilaterally. Full range of motion without deformity. Skin: Skin color, texture, turgor normal.  No rashes or lesions. Neurologic:  Neurovascularly intact without any focal sensory/motor deficits. Psychiatric: Alert and oriented  Capillary Refill: Brisk,< 3 seconds   Peripheral Pulses: +2 palpable, equal bilaterally       Labs:   No results for input(s): WBC, HGB, HCT, PLT in the last 72 hours.   Recent Labs     05/18/20  1720 05/19/20  0545 05/20/20  0615    136 139   K 4.5 4.8 4.2   CL 99 102 102   CO2 25 19* 22

## 2020-05-21 NOTE — PROGRESS NOTES
BEHAVIORAL HEALTH FOLLOW-UP NOTE     5/21/2020     Patient was seen and examined in person, Chart reviewed   Patient's case discussed with staff/team    Chief Complaint: I am still sad    Interim History: I met with the patient today in her room. Patient still endorses significant amount of depression stating that it is an 8 out of 10. The patient does however deny suicidal ideation and per nursing record it appears that the patient has been denying suicidal ideation for some time now. Discussed with the patient that we will continue to see her every day and make medication adjustments and provide whatever she needs for her psychological wellbeing while she is on the unit. The patient was in understanding of this. Social work and counseling services will be contacting this patient to also help facilitate any needs that she may require. Appetite:  [x] Normal/Unchanged  [] Increased  [] Decreased      Sleep:       [] Normal/Unchanged  [x] Fair       [] Poor              Energy:    [x] Normal/Unchanged  [] Increased  [] Decreased        SI [] Present  [x] Absent    HI  []Present  [x] Absent     Aggression:  [] yes  [x] no    Patient is [x] able  [] unable to CONTRACT FOR SAFETY     PAST MEDICAL/PSYCHIATRIC HISTORY:   Past Medical History:   Diagnosis Date    Acute kidney failure with tubular necrosis (HCC)     Anoxic brain damage (HCC)     [facility]     [facility]ipolar disorder [facility]     Cardiac arrest ([facility]     Cerebral artery occlusion with cerebral infarction ([facility]     Cocaine abuse ([facility]     Dementia ([facility]     Depression     GERD (gastroesophageal reflux disease)     Hyperlipidemia     Hyperlipidemia     Hypertension     Iron deficiency anemia     Pseudobulbar affect     Schizoaffective disorder (HCC)     Suicidal ideation     Tachycardia     Tachycardia        FAMILY/SOCIAL HISTORY:  History reviewed. No pertinent family history.   Social History     Socioeconomic History    Marital status:      Spouse name: Not on file    Number of children: Not on file    Years of education: Not on file    Highest education level: Not on file   Occupational History    Not on file   Social Needs    Financial resource strain: Not on file    Food insecurity     Worry: Not on file     Inability: Not on file    Transportation needs     Medical: Not on file     Non-medical: Not on file   Tobacco Use    Smoking status: Former Smoker     Packs/day: 1.00    Smokeless tobacco: Never Used   Substance and Sexual Activity    Alcohol use: Not Currently     Frequency: Monthly or less    Drug use: No     Comment: History of drug use     Sexual activity: Not Currently     Partners: Male   Lifestyle    Physical activity     Days per week: Not on file     Minutes per session: Not on file    Stress: Not on file   Relationships    Social connections     Talks on phone: Not on file     Gets together: Not on file     Attends Rastafari service: Not on file     Active member of club or organization: Not on file     Attends meetings of clubs or organizations: Not on file     Relationship status: Not on file    Intimate partner violence     Fear of current or ex partner: Not on file     Emotionally abused: Not on file     Physically abused: Not on file     Forced sexual activity: Not on file   Other Topics Concern    Not on file   Social History Narrative    Not on file           ROS:  [x] All negative/unchanged except if checked.  Explain positive(checked items) below:  [] Constitutional  [] Eyes  [] Ear/Nose/Mouth/Throat  [] Respiratory  [] CV  [] GI  []   [] Musculoskeletal  [] Skin/Breast  [] Neurological  [] Endocrine  [] Heme/Lymph  [] Allergic/Immunologic    Explanation:     MEDICATIONS:    Current Facility-Administered Medications:     gabapentin (NEURONTIN) capsule 100 mg, 100 mg, Oral, TID, RON Garcia CNS, 100 mg at 05/21/20 0823    mirtazapine (REMERON) tablet 7.5 mg, 7.5 (macrocrystal-monohydrate) (MACROBID) capsule 100 mg, 100 mg, Oral, Daily, Germania Brown MD, 100 mg at 05/21/20 0826    rivastigmine (EXELON) 9.5 MG/24HR 1 patch, 1 patch, Transdermal, Daily, Germania Brown MD, 1 patch at 05/21/20 0940    sucralfate (CARAFATE) tablet 1 g, 1 g, Oral, BID, Albino Patterson MD, 1 g at 05/21/20 0830    sodium chloride flush 0.9 % injection 10 mL, 10 mL, Intravenous, 2 times per day, Germania Brown MD, 10 mL at 05/21/20 0830    sodium chloride flush 0.9 % injection 10 mL, 10 mL, Intravenous, PRN, Germania Brown MD    promethazine (PHENERGAN) tablet 12.5 mg, 12.5 mg, Oral, Q6H PRN **OR** ondansetron (ZOFRAN) injection 4 mg, 4 mg, Intravenous, Q6H PRN, Germania Brown MD    enoxaparin (LOVENOX) injection 30 mg, 30 mg, Subcutaneous, BID, Germania Brown MD, 30 mg at 05/21/20 5459      Examination:  /70   Pulse 54   Temp 98.1 °F (36.7 °C)   Resp 18   Ht 5' 8\" (1.727 m)   Wt 151 lb (68.5 kg)   SpO2 97%   BMI 22.96 kg/m²   Gait - steady  Medication side effects(SE):  No medication side effects to be noted, patient was educated on signs and symptoms of medication side effects instructed to notify medical staff if any signs and symptoms occur. Patient has the capacity to understand this information and what I instructed her to do if medication side effects arise.     Mental Status Examination:    Level of consciousness:  within normal limits   Appearance:  fair grooming and fair hygiene  Behavior/Motor: No psychomotor agitation or retardation noted  Attitude toward examiner: Calm cooperative  Speech: Normal rate rhythm volume and tone  Mood: sad  Affect:  blunted and flat  Thought processes:  linear   Thought content: Without hallucinations delusions or paranoia  Cognition:  oriented to person, place, and time   Concentration intact  Insight fair   Judgement fair     ASSESSMENT:   Patient symptoms are:  [] Well controlled  [] Improving  [] Worsening  [x] No by RON Le - CNP on 5/21/2020 at 12:26 PM

## 2020-05-21 NOTE — PLAN OF CARE
Problem: Suicide risk  Goal: Provide patient with safe environment  Description: Provide patient with safe environment  5/21/2020 1854 by Marvin Fu RN  Outcome: Met This Shift  5/21/2020 1109 by Sukumar Shrestha RN  Outcome: Ongoing     Problem: Falls - Risk of:  Goal: Will remain free from falls  Description: Will remain free from falls  5/21/2020 1854 by Marvin Fu RN  Outcome: Met This Shift  5/21/2020 1109 by Sukumar Shrestha RN  Outcome: Ongoing  Goal: Absence of physical injury  Description: Absence of physical injury  Outcome: Met This Shift     Problem: Pain:  Goal: Pain level will decrease  Description: Pain level will decrease  Outcome: Met This Shift  Goal: Control of acute pain  Description: Control of acute pain  Outcome: Met This Shift  Goal: Control of chronic pain  Description: Control of chronic pain  Outcome: Met This Shift

## 2020-05-21 NOTE — PROGRESS NOTES
Patient tearful and anxious about needing to use bathroom so frequently this shift and needing to clean her hands; Ambulated to the toilet with standby assist x2 thus far; Washed hands afterwards; Continue to cry and state her hands would never be clean; Also complained about pain in her feet; Explained to her the Gabapentin would help with that; Patient appeared satisfied with that information; Returned to bed without incident; Has not expressed any suicidal thoughts; Resting with eyes closed; No apparent distress or discomfort.

## 2020-05-22 ENCOUNTER — HOSPITAL ENCOUNTER (INPATIENT)
Age: 50
LOS: 4 days | Discharge: NURSING, REHAB OR LONG-TERM CARE | DRG: 817 | End: 2020-05-26
Attending: PSYCHIATRY & NEUROLOGY | Admitting: PSYCHIATRY & NEUROLOGY
Payer: MEDICAID

## 2020-05-22 VITALS
BODY MASS INDEX: 22.88 KG/M2 | OXYGEN SATURATION: 96 % | RESPIRATION RATE: 20 BRPM | HEIGHT: 68 IN | DIASTOLIC BLOOD PRESSURE: 62 MMHG | SYSTOLIC BLOOD PRESSURE: 100 MMHG | HEART RATE: 58 BPM | TEMPERATURE: 97 F | WEIGHT: 151 LBS

## 2020-05-22 LAB
ALBUMIN SERPL-MCNC: 3.7 G/DL (ref 3.5–5.2)
ALP BLD-CCNC: 49 U/L (ref 35–104)
ALT SERPL-CCNC: 16 U/L (ref 0–32)
ANION GAP SERPL CALCULATED.3IONS-SCNC: 14 MMOL/L (ref 7–16)
APTT: 37.9 SEC (ref 24.5–35.1)
AST SERPL-CCNC: 22 U/L (ref 0–31)
BACTERIA: ABNORMAL /HPF
BILIRUB SERPL-MCNC: <0.2 MG/DL (ref 0–1.2)
BILIRUBIN URINE: NEGATIVE
BILIRUBIN URINE: NEGATIVE
BLOOD, URINE: NEGATIVE
BLOOD, URINE: NEGATIVE
BUN BLDV-MCNC: 22 MG/DL (ref 6–20)
CALCIUM SERPL-MCNC: 8.5 MG/DL (ref 8.6–10.2)
CHLORIDE BLD-SCNC: 101 MMOL/L (ref 98–107)
CLARITY: CLEAR
CLARITY: CLEAR
CO2: 21 MMOL/L (ref 22–29)
COLOR: YELLOW
COLOR: YELLOW
CREAT SERPL-MCNC: 0.8 MG/DL (ref 0.5–1)
D DIMER: <200 NG/ML DDU
FIBRINOGEN: 249 MG/DL (ref 225–540)
GFR AFRICAN AMERICAN: >60
GFR NON-AFRICAN AMERICAN: >60 ML/MIN/1.73
GLUCOSE BLD-MCNC: 93 MG/DL (ref 74–99)
GLUCOSE URINE: NEGATIVE MG/DL
GLUCOSE URINE: NEGATIVE MG/DL
INR BLD: 1
KETONES, URINE: NEGATIVE MG/DL
KETONES, URINE: NEGATIVE MG/DL
LEUKOCYTE ESTERASE, URINE: ABNORMAL
LEUKOCYTE ESTERASE, URINE: NEGATIVE
NITRITE, URINE: NEGATIVE
NITRITE, URINE: NEGATIVE
PH UA: 7 (ref 5–9)
PH UA: 7.5 (ref 5–9)
POTASSIUM REFLEX MAGNESIUM: 4 MMOL/L (ref 3.5–5)
PROTEIN UA: NEGATIVE MG/DL
PROTEIN UA: NEGATIVE MG/DL
PROTHROMBIN TIME: 10.8 SEC (ref 9.3–12.4)
RBC UA: ABNORMAL /HPF (ref 0–2)
SARS-COV-2, NAAT: NOT DETECTED
SODIUM BLD-SCNC: 136 MMOL/L (ref 132–146)
SPECIFIC GRAVITY UA: 1.02 (ref 1–1.03)
SPECIFIC GRAVITY UA: 1.02 (ref 1–1.03)
TOTAL PROTEIN: 6.2 G/DL (ref 6.4–8.3)
UROBILINOGEN, URINE: 0.2 E.U./DL
UROBILINOGEN, URINE: 0.2 E.U./DL
WBC UA: ABNORMAL /HPF (ref 0–5)

## 2020-05-22 PROCEDURE — 2580000003 HC RX 258: Performed by: INTERNAL MEDICINE

## 2020-05-22 PROCEDURE — 85730 THROMBOPLASTIN TIME PARTIAL: CPT

## 2020-05-22 PROCEDURE — 81001 URINALYSIS AUTO W/SCOPE: CPT

## 2020-05-22 PROCEDURE — 6370000000 HC RX 637 (ALT 250 FOR IP): Performed by: CLINICAL NURSE SPECIALIST

## 2020-05-22 PROCEDURE — 85384 FIBRINOGEN ACTIVITY: CPT

## 2020-05-22 PROCEDURE — 80053 COMPREHEN METABOLIC PANEL: CPT

## 2020-05-22 PROCEDURE — 85610 PROTHROMBIN TIME: CPT

## 2020-05-22 PROCEDURE — 6370000000 HC RX 637 (ALT 250 FOR IP): Performed by: INTERNAL MEDICINE

## 2020-05-22 PROCEDURE — 85378 FIBRIN DEGRADE SEMIQUANT: CPT

## 2020-05-22 PROCEDURE — 81003 URINALYSIS AUTO W/O SCOPE: CPT

## 2020-05-22 PROCEDURE — 6360000002 HC RX W HCPCS: Performed by: INTERNAL MEDICINE

## 2020-05-22 PROCEDURE — 99232 SBSQ HOSP IP/OBS MODERATE 35: CPT | Performed by: NURSE PRACTITIONER

## 2020-05-22 PROCEDURE — 6370000000 HC RX 637 (ALT 250 FOR IP): Performed by: NURSE PRACTITIONER

## 2020-05-22 PROCEDURE — U0002 COVID-19 LAB TEST NON-CDC: HCPCS

## 2020-05-22 PROCEDURE — 1240000000 HC EMOTIONAL WELLNESS R&B

## 2020-05-22 PROCEDURE — 36415 COLL VENOUS BLD VENIPUNCTURE: CPT

## 2020-05-22 RX ORDER — NICOTINE 21 MG/24HR
1 PATCH, TRANSDERMAL 24 HOURS TRANSDERMAL EVERY 24 HOURS
Status: DISCONTINUED | OUTPATIENT
Start: 2020-05-22 | End: 2020-05-26 | Stop reason: HOSPADM

## 2020-05-22 RX ORDER — NICOTINE 21 MG/24HR
1 PATCH, TRANSDERMAL 24 HOURS TRANSDERMAL EVERY 24 HOURS
Status: CANCELLED | OUTPATIENT
Start: 2020-05-22

## 2020-05-22 RX ORDER — ACETAMINOPHEN 325 MG/1
650 TABLET ORAL EVERY 6 HOURS PRN
Status: CANCELLED | OUTPATIENT
Start: 2020-05-22

## 2020-05-22 RX ORDER — LACTULOSE 10 G/15ML
20 SOLUTION ORAL 3 TIMES DAILY
Status: CANCELLED | OUTPATIENT
Start: 2020-05-22

## 2020-05-22 RX ORDER — CETIRIZINE HYDROCHLORIDE 10 MG/1
10 TABLET ORAL DAILY
Status: DISCONTINUED | OUTPATIENT
Start: 2020-05-23 | End: 2020-05-26 | Stop reason: HOSPADM

## 2020-05-22 RX ORDER — CETIRIZINE HYDROCHLORIDE 10 MG/1
10 TABLET ORAL DAILY
Status: CANCELLED | OUTPATIENT
Start: 2020-05-23

## 2020-05-22 RX ORDER — LORAZEPAM 1 MG/1
1 TABLET ORAL 4 TIMES DAILY PRN
Status: CANCELLED | OUTPATIENT
Start: 2020-05-22

## 2020-05-22 RX ORDER — PETROLATUM 42 G/100G
OINTMENT TOPICAL 2 TIMES DAILY PRN
Status: CANCELLED | OUTPATIENT
Start: 2020-05-22

## 2020-05-22 RX ORDER — RIVASTIGMINE 9.5 MG/24H
1 PATCH, EXTENDED RELEASE TRANSDERMAL DAILY
Status: CANCELLED | OUTPATIENT
Start: 2020-05-23

## 2020-05-22 RX ORDER — ATENOLOL 25 MG/1
25 TABLET ORAL DAILY
Status: DISCONTINUED | OUTPATIENT
Start: 2020-05-23 | End: 2020-05-26 | Stop reason: HOSPADM

## 2020-05-22 RX ORDER — FERROUS SULFATE 325(65) MG
325 TABLET ORAL
Status: CANCELLED | OUTPATIENT
Start: 2020-05-23

## 2020-05-22 RX ORDER — MIRTAZAPINE 15 MG/1
7.5 TABLET, FILM COATED ORAL NIGHTLY
Status: CANCELLED | OUTPATIENT
Start: 2020-05-22

## 2020-05-22 RX ORDER — AMLODIPINE BESYLATE 10 MG/1
10 TABLET ORAL DAILY
Status: CANCELLED | OUTPATIENT
Start: 2020-05-23

## 2020-05-22 RX ORDER — AMLODIPINE BESYLATE 10 MG/1
10 TABLET ORAL DAILY
Status: DISCONTINUED | OUTPATIENT
Start: 2020-05-23 | End: 2020-05-26 | Stop reason: HOSPADM

## 2020-05-22 RX ORDER — FAMOTIDINE 20 MG/1
20 TABLET, FILM COATED ORAL 2 TIMES DAILY
Status: DISCONTINUED | OUTPATIENT
Start: 2020-05-22 | End: 2020-05-26 | Stop reason: HOSPADM

## 2020-05-22 RX ORDER — PETROLATUM 42 G/100G
OINTMENT TOPICAL 2 TIMES DAILY PRN
Status: DISCONTINUED | OUTPATIENT
Start: 2020-05-22 | End: 2020-05-26 | Stop reason: HOSPADM

## 2020-05-22 RX ORDER — GABAPENTIN 100 MG/1
100 CAPSULE ORAL 3 TIMES DAILY
Status: DISCONTINUED | OUTPATIENT
Start: 2020-05-22 | End: 2020-05-26 | Stop reason: HOSPADM

## 2020-05-22 RX ORDER — CHOLECALCIFEROL (VITAMIN D3) 125 MCG
5 CAPSULE ORAL NIGHTLY
Status: CANCELLED | OUTPATIENT
Start: 2020-05-22

## 2020-05-22 RX ORDER — NITROFURANTOIN 25; 75 MG/1; MG/1
100 CAPSULE ORAL DAILY
Status: CANCELLED | OUTPATIENT
Start: 2020-05-23 | End: 2020-05-28

## 2020-05-22 RX ORDER — ACETAMINOPHEN 325 MG/1
650 TABLET ORAL EVERY 6 HOURS PRN
Status: DISCONTINUED | OUTPATIENT
Start: 2020-05-22 | End: 2020-05-26 | Stop reason: HOSPADM

## 2020-05-22 RX ORDER — SUCRALFATE 1 G/1
1 TABLET ORAL 2 TIMES DAILY
Status: DISCONTINUED | OUTPATIENT
Start: 2020-05-22 | End: 2020-05-26 | Stop reason: HOSPADM

## 2020-05-22 RX ORDER — NITROFURANTOIN 25; 75 MG/1; MG/1
100 CAPSULE ORAL DAILY
Status: DISCONTINUED | OUTPATIENT
Start: 2020-05-23 | End: 2020-05-26 | Stop reason: HOSPADM

## 2020-05-22 RX ORDER — LORAZEPAM 1 MG/1
1 TABLET ORAL 4 TIMES DAILY PRN
Status: DISCONTINUED | OUTPATIENT
Start: 2020-05-22 | End: 2020-05-26 | Stop reason: HOSPADM

## 2020-05-22 RX ORDER — DIVALPROEX SODIUM 500 MG/1
500 TABLET, EXTENDED RELEASE ORAL NIGHTLY
Status: DISCONTINUED | OUTPATIENT
Start: 2020-05-22 | End: 2020-05-26 | Stop reason: HOSPADM

## 2020-05-22 RX ORDER — GABAPENTIN 100 MG/1
100 CAPSULE ORAL 3 TIMES DAILY
Status: CANCELLED | OUTPATIENT
Start: 2020-05-22

## 2020-05-22 RX ORDER — LACTULOSE 10 G/15ML
20 SOLUTION ORAL 3 TIMES DAILY
Status: DISCONTINUED | OUTPATIENT
Start: 2020-05-22 | End: 2020-05-26 | Stop reason: HOSPADM

## 2020-05-22 RX ORDER — ATENOLOL 25 MG/1
25 TABLET ORAL DAILY
Status: CANCELLED | OUTPATIENT
Start: 2020-05-23

## 2020-05-22 RX ORDER — FERROUS SULFATE 325(65) MG
325 TABLET ORAL
Status: DISCONTINUED | OUTPATIENT
Start: 2020-05-23 | End: 2020-05-26 | Stop reason: HOSPADM

## 2020-05-22 RX ORDER — CHOLECALCIFEROL (VITAMIN D3) 125 MCG
5 CAPSULE ORAL NIGHTLY
Status: DISCONTINUED | OUTPATIENT
Start: 2020-05-22 | End: 2020-05-26 | Stop reason: HOSPADM

## 2020-05-22 RX ORDER — RIVASTIGMINE 9.5 MG/24H
1 PATCH, EXTENDED RELEASE TRANSDERMAL DAILY
Status: DISCONTINUED | OUTPATIENT
Start: 2020-05-23 | End: 2020-05-26 | Stop reason: HOSPADM

## 2020-05-22 RX ORDER — FOLIC ACID 1 MG/1
1 TABLET ORAL DAILY
Status: CANCELLED | OUTPATIENT
Start: 2020-05-23

## 2020-05-22 RX ORDER — DIVALPROEX SODIUM 500 MG/1
500 TABLET, EXTENDED RELEASE ORAL NIGHTLY
Status: CANCELLED | OUTPATIENT
Start: 2020-05-22

## 2020-05-22 RX ORDER — MIRTAZAPINE 15 MG/1
7.5 TABLET, FILM COATED ORAL NIGHTLY
Status: DISCONTINUED | OUTPATIENT
Start: 2020-05-22 | End: 2020-05-26 | Stop reason: HOSPADM

## 2020-05-22 RX ORDER — SUCRALFATE 1 G/1
1 TABLET ORAL 2 TIMES DAILY
Status: CANCELLED | OUTPATIENT
Start: 2020-05-22

## 2020-05-22 RX ORDER — FAMOTIDINE 20 MG/1
20 TABLET, FILM COATED ORAL 2 TIMES DAILY
Status: CANCELLED | OUTPATIENT
Start: 2020-05-22

## 2020-05-22 RX ORDER — FOLIC ACID 1 MG/1
1 TABLET ORAL DAILY
Status: DISCONTINUED | OUTPATIENT
Start: 2020-05-23 | End: 2020-05-26 | Stop reason: HOSPADM

## 2020-05-22 RX ADMIN — LORAZEPAM 1 MG: 1 TABLET ORAL at 20:46

## 2020-05-22 RX ADMIN — GABAPENTIN 100 MG: 100 CAPSULE ORAL at 13:53

## 2020-05-22 RX ADMIN — FOLIC ACID 1 MG: 1 TABLET ORAL at 09:27

## 2020-05-22 RX ADMIN — FERROUS SULFATE TAB 325 MG (65 MG ELEMENTAL FE) 325 MG: 325 (65 FE) TAB at 09:27

## 2020-05-22 RX ADMIN — MIRTAZAPINE 7.5 MG: 15 TABLET, FILM COATED ORAL at 20:46

## 2020-05-22 RX ADMIN — ACETAMINOPHEN 650 MG: 325 TABLET ORAL at 06:49

## 2020-05-22 RX ADMIN — Medication 5 MG: at 20:51

## 2020-05-22 RX ADMIN — ENOXAPARIN SODIUM 30 MG: 30 INJECTION, SOLUTION INTRAVENOUS; SUBCUTANEOUS at 09:26

## 2020-05-22 RX ADMIN — Medication 10 ML: at 09:28

## 2020-05-22 RX ADMIN — SERTRALINE 25 MG: 50 TABLET, FILM COATED ORAL at 09:26

## 2020-05-22 RX ADMIN — CETIRIZINE HYDROCHLORIDE 10 MG: 10 TABLET, FILM COATED ORAL at 09:27

## 2020-05-22 RX ADMIN — GABAPENTIN 100 MG: 100 CAPSULE ORAL at 20:46

## 2020-05-22 RX ADMIN — FAMOTIDINE 20 MG: 20 TABLET, FILM COATED ORAL at 20:46

## 2020-05-22 RX ADMIN — ACETAMINOPHEN 650 MG: 325 TABLET ORAL at 20:52

## 2020-05-22 RX ADMIN — GABAPENTIN 100 MG: 100 CAPSULE ORAL at 09:28

## 2020-05-22 RX ADMIN — DIVALPROEX SODIUM 500 MG: 500 TABLET, EXTENDED RELEASE ORAL at 20:46

## 2020-05-22 RX ADMIN — SUCRALFATE 1 G: 1 TABLET ORAL at 09:27

## 2020-05-22 RX ADMIN — NITROFURANTOIN MONOHYDRATE/MACROCRYSTALLINE 100 MG: 25; 75 CAPSULE ORAL at 09:28

## 2020-05-22 RX ADMIN — SUCRALFATE 1 G: 1 TABLET ORAL at 20:46

## 2020-05-22 ASSESSMENT — PAIN DESCRIPTION - ORIENTATION
ORIENTATION: LEFT
ORIENTATION: RIGHT;LEFT
ORIENTATION: RIGHT;LEFT

## 2020-05-22 ASSESSMENT — PAIN DESCRIPTION - DESCRIPTORS
DESCRIPTORS: CONSTANT;DISCOMFORT
DESCRIPTORS: DISCOMFORT
DESCRIPTORS: ACHING;DISCOMFORT

## 2020-05-22 ASSESSMENT — PAIN DESCRIPTION - ONSET
ONSET: ON-GOING

## 2020-05-22 ASSESSMENT — PAIN DESCRIPTION - LOCATION
LOCATION: FOOT

## 2020-05-22 ASSESSMENT — SLEEP AND FATIGUE QUESTIONNAIRES
DIFFICULTY FALLING ASLEEP: YES
DO YOU USE A SLEEP AID: YES
DO YOU HAVE DIFFICULTY SLEEPING: YES
DIFFICULTY STAYING ASLEEP: YES
DIFFICULTY ARISING: NO
AVERAGE NUMBER OF SLEEP HOURS: 2
SLEEP PATTERN: DIFFICULTY FALLING ASLEEP;INSOMNIA;RESTLESSNESS
RESTFUL SLEEP: NO

## 2020-05-22 ASSESSMENT — PAIN - FUNCTIONAL ASSESSMENT
PAIN_FUNCTIONAL_ASSESSMENT: PREVENTS OR INTERFERES SOME ACTIVE ACTIVITIES AND ADLS
PAIN_FUNCTIONAL_ASSESSMENT: ACTIVITIES ARE NOT PREVENTED

## 2020-05-22 ASSESSMENT — LIFESTYLE VARIABLES: HISTORY_ALCOHOL_USE: NO

## 2020-05-22 ASSESSMENT — PAIN DESCRIPTION - FREQUENCY
FREQUENCY: CONTINUOUS

## 2020-05-22 ASSESSMENT — PATIENT HEALTH QUESTIONNAIRE - PHQ9: SUM OF ALL RESPONSES TO PHQ QUESTIONS 1-9: 22

## 2020-05-22 ASSESSMENT — PAIN SCALES - GENERAL
PAINLEVEL_OUTOF10: 10
PAINLEVEL_OUTOF10: 0
PAINLEVEL_OUTOF10: 10
PAINLEVEL_OUTOF10: 10
PAINLEVEL_OUTOF10: 0
PAINLEVEL_OUTOF10: 0
PAINLEVEL_OUTOF10: 10

## 2020-05-22 ASSESSMENT — PAIN DESCRIPTION - PROGRESSION
CLINICAL_PROGRESSION: NOT CHANGED
CLINICAL_PROGRESSION: NOT CHANGED

## 2020-05-22 ASSESSMENT — PAIN DESCRIPTION - PAIN TYPE
TYPE: CHRONIC PAIN

## 2020-05-22 NOTE — PLAN OF CARE
Problem: Suicide risk  Goal: Provide patient with safe environment  5/22/2020 0811 by Giselle Elias RN  Outcome: Met This Shift  5/21/2020 1854 by Delroy Tejeda RN  Outcome: Met This Shift     Problem: Falls - Risk of:  Goal: Will remain free from falls  5/22/2020 0811 by Giselle Elias RN  Outcome: Met This Shift  5/21/2020 1854 by Delroy Tejeda RN  Outcome: Met This Shift  Goal: Absence of physical injury  5/22/2020 0811 by Giselle Elias RN  Outcome: Met This Shift  5/21/2020 1854 by Delroy Tejeda RN  Outcome: Met This Shift     Problem: Pain:  Goal: Pain level will decrease  5/22/2020 0811 by Giselle Elias RN  Outcome: Met This Shift  5/21/2020 1854 by Delroy Tejeda RN  Outcome: Met This Shift  Goal: Control of acute pain  5/22/2020 0811 by Giselle Elias RN  Outcome: Met This Shift  5/21/2020 1854 by Delroy Tejeda RN  Outcome: Met This Shift  Goal: Control of chronic pain  5/22/2020 0811 by Giselle Elias RN  Outcome: Met This Shift  5/21/2020 1854 by Delroy Tejeda RN  Outcome: Met This Shift

## 2020-05-22 NOTE — PROGRESS NOTES
Notified Clayton Leonardo NP and Psych that patient has two negative COVIDS and is medically cleared for psych.

## 2020-05-22 NOTE — ED NOTES
Pt has been accepted to 7[facility] by Dr. France Webster admitting and spoke to Abner Reddy to assign bed [id number]    Faxed voluntary to 7s    Updated nurse [facility] [address] [place], [place]  05/22/20 9409

## 2020-05-22 NOTE — PROGRESS NOTES
Pt denies a plan with suicidal ideation. Patient reports she feels like she doesn't want to live anymore. Ligature risks removed from the room. Patient cooperative and tearful when the situation with her daughter has been brought up.

## 2020-05-22 NOTE — PROGRESS NOTES
BEHAVIORAL HEALTH FOLLOW-UP NOTE     5/22/2020     Patient was seen and examined in person, Chart reviewed   Patient's case discussed with staff/team    Chief Complaint: I am still sad    Interim History: Met with the patient in her room today. The patient still endorses depression but she denies suicidal ideation or intent to harm. Per review of the chart the patient has denied suicidal ideation during every assessment by nursing staff during her entire stay. We initiated the patient on antidepressant medication due to her depressive symptoms. At this time the patient vehemently denies suicidal homicidal ideation or intent to harm. Appetite:  [x] Normal/Unchanged  [] Increased  [] Decreased      Sleep:       [] Normal/Unchanged  [x] Fair       [] Poor              Energy:    [x] Normal/Unchanged  [] Increased  [] Decreased        SI [] Present  [x] Absent    HI  []Present  [x] Absent     Aggression:  [] yes  [x] no    Patient is [x] able  [] unable to CONTRACT FOR SAFETY     PAST MEDICAL/PSYCHIATRIC HISTORY:   Past Medical History:   Diagnosis Date    Acute kidney failure with tubular necrosis (HCC)     Anoxic brain damage (HCC)     [facility]     Bipolar disorder ([facility]     Cardiac arrest ([facility]     Cerebral artery occlusion with cerebral infarction ([facility]     Cocaine abuse ([facility] [facility]     Dementia ([facility] [facility]     Depression     GERD (gastroesophageal reflux disease)     Hyperlipidemia     Hyperlipidemia     Hypertension     Iron deficiency anemia     Pseudobulbar affect     Schizoaffective disorder (HCC)     Suicidal ideation     Tachycardia     Tachycardia        FAMILY/SOCIAL HISTORY:  History reviewed. No pertinent family history.   Social History     Socioeconomic History    Marital status:      Spouse name: Not on file    Number of children: Not on file    Years of education: Not on file    Highest education level: Not on file   Occupational History    Not on file Social Needs    Financial resource strain: Not on file    Food insecurity     Worry: Not on file     Inability: Not on file    Transportation needs     Medical: Not on file     Non-medical: Not on file   Tobacco Use    Smoking status: Former Smoker     Packs/day: 1.00    Smokeless tobacco: Never Used   Substance and Sexual Activity    Alcohol use: Not Currently     Frequency: Monthly or less    Drug use: No     Comment: History of drug use     Sexual activity: Not Currently     Partners: Male   Lifestyle    Physical activity     Days per week: Not on file     Minutes per session: Not on file    Stress: Not on file   Relationships    Social connections     Talks on phone: Not on file     Gets together: Not on file     Attends Jain service: Not on file     Active member of club or organization: Not on file     Attends meetings of clubs or organizations: Not on file     Relationship status: Not on file    Intimate partner violence     Fear of current or ex partner: Not on file     Emotionally abused: Not on file     Physically abused: Not on file     Forced sexual activity: Not on file   Other Topics Concern    Not on file   Social History Narrative    Not on file           ROS:  [x] All negative/unchanged except if checked.  Explain positive(checked items) below:  [] Constitutional  [] Eyes  [] Ear/Nose/Mouth/Throat  [] Respiratory  [] CV  [] GI  []   [] Musculoskeletal  [] Skin/Breast  [] Neurological  [] Endocrine  [] Heme/Lymph  [] Allergic/Immunologic    Explanation:     MEDICATIONS:    Current Facility-Administered Medications:     sertraline (ZOLOFT) tablet 25 mg, 25 mg, Oral, Daily, Alfshivam Peña, APRN - CNP, 25 mg at 05/22/20 8439    gabapentin (NEURONTIN) capsule 100 mg, 100 mg, Oral, TID, Miladis Pipe, APRN - CNS, 100 mg at 05/22/20 0373    mirtazapine (REMERON) tablet 7.5 mg, 7.5 mg, Oral, Nightly, Alfshivam Peña, APRN - CNP, 7.5 mg at 05/21/20 1622    divalproex (DEPAKOTE ER) extended release tablet 500 mg, 500 mg, Oral, Nightly, RON Leblanc - CNP, 500 mg at 05/21/20 2117    melatonin tablet 5 mg, 5 mg, Oral, Nightly, RON Leblanc - CNP, 5 mg at 05/21/20 2222    mineral oil-hydrophilic petrolatum (HYDROPHOR) ointment, , Topical, BID PRN, RON Leslie - CNS    acetaminophen (TYLENOL) tablet 650 mg, 650 mg, Oral, Q6H PRN, Bernadette Solitario MD, 650 mg at 05/22/20 0649    amLODIPine (NORVASC) tablet 10 mg, 10 mg, Oral, Daily, Albino Patterson MD, 10 mg at 05/21/20 0825    atenolol (TENORMIN) tablet 25 mg, 25 mg, Oral, Daily, Albino Patterson MD, 25 mg at 05/21/20 0826    bisacodyl (DULCOLAX) EC tablet 10 mg, 10 mg, Oral, Daily PRN, Bernadette Solitario MD    cetirizine (ZYRTEC) tablet 10 mg, 10 mg, Oral, Daily, Albino Patterson MD, 10 mg at 05/22/20 0904    conjugated estrogens (PREMARIN) vaginal cream 0.5 g, 0.5 g, Vaginal, Q72H, Albino Patterson MD, 0.5 g at 05/21/20 2222    famotidine (PEPCID) tablet 20 mg, 20 mg, Oral, BID, Albino Patterson MD, 20 mg at 05/21/20 2117    ferrous sulfate (IRON 325) tablet 325 mg, 325 mg, Oral, Daily with breakfast, Bernadette Solitario MD, 325 mg at 80/40/20 0447    folic acid (FOLVITE) tablet 1 mg, 1 mg, Oral, Daily, Albino Patterson MD, 1 mg at 05/22/20 0927    lactulose (CHRONULAC) 10 GM/15ML solution 20 g, 20 g, Oral, TID, Albino Patterson MD, 20 g at 05/21/20 1301    LORazepam (ATIVAN) tablet 1 mg, 1 mg, Oral, 4x Daily PRN, Bernadette Solitario MD, 1 mg at 05/21/20 2117    magnesium hydroxide (MILK OF MAGNESIA) 400 MG/5ML suspension 30 mL, 30 mL, Oral, Daily PRN, Bernadette Solitario MD    melatonin tablet 5 mg, 5 mg, Oral, Nightly PRN, Bernadette Solitario MD, 5 mg at 05/21/20 0147    nicotine (NICODERM CQ) 14 MG/24HR 1 patch, 1 patch, Transdermal, Q24H, Albino Patterson MD, 1 patch at 05/21/20 1709    nitrofurantoin (macrocrystal-monohydrate) (MACROBID) capsule 100 mg, 100 mg, Oral, Daily, Albino Patterson MD, 100 mg at 05/22/20 0928    rivastigmine (EXELON) 9.5 MG/24HR 1 patch, 1 patch, Transdermal, Daily, Tramaine Sanabria MD, 1 patch at 05/22/20 0928    sucralfate (CARAFATE) tablet 1 g, 1 g, Oral, BID, Tramaine Sanabria MD, 1 g at 05/22/20 0353    sodium chloride flush 0.9 % injection 10 mL, 10 mL, Intravenous, 2 times per day, Tramaine Sanabria MD, 10 mL at 05/22/20 0928    sodium chloride flush 0.9 % injection 10 mL, 10 mL, Intravenous, PRN, Tramaine Sanabria MD    promethazine (PHENERGAN) tablet 12.5 mg, 12.5 mg, Oral, Q6H PRN **OR** ondansetron (ZOFRAN) injection 4 mg, 4 mg, Intravenous, Q6H PRN, Tramaine Sanabria MD    enoxaparin (LOVENOX) injection 30 mg, 30 mg, Subcutaneous, BID, Albino Patterson MD, 30 mg at 05/22/20 8848      Examination:  /62   Pulse 58   Temp 97 °F (36.1 °C)   Resp 20   Ht 5' 8\" (1.727 m)   Wt 151 lb (68.5 kg)   SpO2 96%   BMI 22.96 kg/m²   Gait - steady  Medication side effects(SE):  No medication side effects to be noted, patient was educated on signs and symptoms of medication side effects instructed to notify medical staff if any signs and symptoms occur. Patient has the capacity to understand this information and what I instructed her to do if medication side effects arise.     Mental Status Examination:    Level of consciousness:  within normal limits   Appearance:  fair grooming and fair hygiene  Behavior/Motor: No psychomotor agitation or retardation noted  Attitude toward examiner: Calm cooperative  Speech: Normal rate rhythm volume and tone  Mood: sad  Affect:  blunted and flat  Thought processes:  linear   Thought content: Without hallucinations delusions or paranoia  Cognition:  oriented to person, place, and time   Concentration intact  Insight fair   Judgement fair     ASSESSMENT:   Patient symptoms are:  [] Well controlled  [x] Improving  [] Worsening  [] No change      Diagnosis:  Principal Problem:    Adjustment reaction with mixed disturbance of emotions and conduct  Active Problems:    COVID-19 virus infection    Suicide attempt [facility] [address]    Dementia with behavioral disturbance ([facility] [address]  Resolved Problems:    * No resolved hospital problems. *      LABS:    Recent Labs     05/21/20  1155   WBC 7.4   HGB 13.9        Recent Labs     05/20/20  0615 05/21/20  1155 05/22/20  0340    144 136   K 4.2 4.9 4.0    106 101   CO2 22 23 21*   BUN 25* 19 22*   CREATININE 0.8 0.8 0.8   GLUCOSE 87 74 93     Recent Labs     05/20/20  0615 05/21/20  1155 05/22/20  0340   BILITOT 0.2 <0.2 <0.2   ALKPHOS 50 50 49   AST 15 18 22   ALT 9 12 16     Lab Results   Component Value Date    LABAMPH NOT DETECTED 05/17/2020    BARBSCNU NOT DETECTED 05/17/2020    LABBENZ NOT DETECTED 05/17/2020    LABMETH NOT DETECTED 05/17/2020    OPIATESCREENURINE NOT DETECTED 05/17/2020    PHENCYCLIDINESCREENURINE NOT DETECTED 05/17/2020    ETOH <10 05/17/2020     Lab Results   Component Value Date    TSH 1.960 08/27/2017     No results found for: LITHIUM  No results found for: VALPROATE, CBMZ    Treatment Plan:  Reviewed current Medications with the patient. Depakote  mg nightly  Remeron 7.5 mg nightly  Zoloft 25 mg daily    Risks, benefits, side effects, drug-to-drug interactions and alternatives to treatment were discussed. Collateral information: To be obtained by  to insure patient safety on discharge  CD evaluation  Encourage patient to attend group and other milieu activities.   Discharge planning discussed with the patient and treatment team.    PSYCHOTHERAPY/COUNSELING:  [x] Therapeutic interview  [x] Supportive  [] CBT  [] Ongoing  [] Other    [x] Patient continues to need, on a daily basis, active treatment furnished directly by or requiring the supervision of inpatient psychiatric personnel      Anticipated Length of stay: 5 to 7 days            Electronically signed by RON Layton CNP on 5/22/2020 at 12:53 PM

## 2020-05-22 NOTE — ED NOTES
[facility] was alerted that pt is medically cleared with 2 negative COVIDS    [facility] nurse will review chart for admission    Received voluntary      EMERALD Black  05/22/20 7509

## 2020-05-22 NOTE — DISCHARGE SUMMARY
Hospital Medicine Discharge Summary    Patient ID: Armand Adan      Patient's PCP: Janna Salter MD    Admit Date: 5/17/2020     Discharge Date:   05/22/20     Admitting Physician: Bin Contreras MD     Discharge Physician: RON Trejo - CNS, CNP    Discharge Diagnoses: Active Hospital Problems    Diagnosis Date Noted    Suicide attempt [facility]) Suzy Dickson 05/19/2020    Adjustment reaction with mixed disturbance of emotions and conduct [F43.25] 05/19/2020    Dementia with behavioral disturbance ([address] [F03.91] 05/19/2020    COVID-19 virus infection [U07.1] 05/18/2020       The patient was seen and examined on day of discharge and this discharge summary is in conjunction with any daily progress note from day of discharge. Hospital Course:   44 y.o. female history of CVA, GERD, bipolar who was brought in from nursing home  Due to suicide attempt.  The patient lives in a single room at nursing home. Ken Ybarra has been staying at a nursing home after she had a near fatal overdose attempted Wellbutrin and possible anoxic brain injury.  She has been having suicidal ideation.  Has history of attempted suicide. Ken Ybarra recently lost her 35-year-old daughter from drug overdose. Ken Ybarra has been mourning her daughter.  Decided to take her life. Ken Ybarra grabbed onto cord in her room and attempted to strangle herself. ER, lab work was unremarkable.  Urine drug tox screen was negative.  CTA neck with contrast was unremarkable.  COVID-19 testing was positive.  She was seen by psychiatry consult but cannot be admitted due to COVID-19 positive testing. 5/19/20: vital signs stable, maintaining oxygen saturation without supplemental oxygen. Will admit to psych when covid negative. 5/20/20: vital signs stable. Labs stable. Maintaining oxygen saturation on no supplemental oxygen. 5/21/20: patient continues to maintain oxygen saturations on no supplemental oxygen. She is remains afebrile.  Started on gabapentin for neuropathic pain in her feet. covid to be repeated today  5/22/20: patient transferred to [facility] unit yesterday. Remains off supplemental oxygen and afebrile. Covid negative yesterday, will repeat today for potential disposition to inpatient psych. Second Covid is negative. Patient can be transferred inpatient psych. Patient's symptoms improved. Patient was deemed stable for transfer. Exam:     /62   Pulse 58   Temp 97 °F (36.1 °C)   Resp 20   Ht 5' 8\" (1.727 m)   Wt 151 lb (68.5 kg)   SpO2 96%   BMI 22.96 kg/m²     General appearance: No apparent distress, appears stated age and cooperative. HEENT: Pupils equal, round, and reactive to light. Conjunctivae/corneas clear. Neck: Supple, with full range of motion. No jugular venous distention. Trachea midline. Respiratory:  Normal respiratory effort. Clear to auscultation, bilaterally without Rales/Wheezes/Rhonchi. Cardiovascular: Regular rate and rhythm with normal S1/S2 without murmurs, rubs or gallops. Abdomen: Soft, non-tender, non-distended with normal bowel sounds. Musculoskeletal: No clubbing, cyanosis or edema bilaterally. Full range of motion without deformity. Skin: Skin color, texture, turgor normal.  No rashes or lesions. Neurologic:  Neurovascularly intact without any focal sensory/motor deficits. Psychiatric: Alert and oriented, thought content appropriate, normal insight      Consults:     IP CONSULT TO SOCIAL WORK  IP CONSULT TO INTERNAL MEDICINE  IP CONSULT TO PSYCHIATRY  IP CONSULT TO PSYCHIATRY    Significant Diagnostic Studies:     XR CHEST PORTABLE   Final Result      NO ACUTE CARDIOPULMONARY PROCESS         CTA NECK W CONTRAST   Final Result      Essentially negative CTA of the neck. CT CERVICAL SPINE WO CONTRAST   Final Result      No acute fracture or spondylolisthesis is identified on CT of cervical   spine.       Diffuse degenerative disc and facet disease result in multilevel   central and foraminal stenosis. Cervical kyphosis which can be due to muscle spasm or positional.      Atherosclerotic vascular disease. Disposition:  Inpatient psych    Discharge Instructions/Follow-up:  Medications as ordered    Code Status:  Full Code     Activity: activity as tolerated    Diet: regular diet    Labs: For convenience and continuity at follow-up the following most recent labs are provided:      CBC:    Lab Results   Component Value Date    WBC 7.4 05/21/2020    HGB 13.9 05/21/2020    HCT 41.6 05/21/2020     05/21/2020       Renal:    Lab Results   Component Value Date     05/22/2020    K 4.0 05/22/2020     05/22/2020    CO2 21 05/22/2020    BUN 22 05/22/2020    CREATININE 0.8 05/22/2020    CALCIUM 8.5 05/22/2020    PHOS 3.7 08/27/2017       Discharge Medications:     Current Discharge Medication List           Details   LORazepam (ATIVAN) 1 MG tablet Take 1 mg by mouth 4 times daily as needed for Anxiety.       bisacodyl (DULCOLAX) 5 MG EC tablet Take 10 mg by mouth daily as needed for Constipation      divalproex (DEPAKOTE) 500 MG DR tablet Take 1,000 mg by mouth nightly      bisacodyl (DULCOLAX) 10 MG suppository Place 10 mg rectally daily as needed for Constipation      Sodium Phosphates (FLEET) 7-19 GM/118ML Place 1 enema rectally once as needed (constipation)      paliperidone (INVEGA) 9 MG extended release tablet Take 9 mg by mouth every morning      lactulose (CHRONULAC) 10 GM/15ML solution Take 20 g by mouth 3 times daily      nitrofurantoin, macrocrystal-monohydrate, (MACROBID) 100 MG capsule Take 100 mg by mouth daily      magnesium hydroxide (MILK OF MAGNESIA) 400 MG/5ML suspension Take 30 mLs by mouth daily as needed for Constipation      melatonin 5 MG TABS tablet Take 5 mg by mouth nightly as needed (sleep)      nicotine (NICODERM CQ) 14 MG/24HR Place 1 patch onto the skin every 24 hours      conjugated estrogens (PREMARIN) 0.625 MG/GM vaginal cream Place 0.5 g vaginally every 72 hours      Meth-Hyo-M Bl-Na Phos-Ph Sal (URIBEL) 118 MG CAPS Take 118 mg by mouth 4 times daily as needed (dysuria)      acetaminophen (TYLENOL) 325 MG tablet Take 650 mg by mouth every 4 hours as needed for Pain or Fever       atenolol (TENORMIN) 25 MG tablet Take 25 mg by mouth daily      sucralfate (CARAFATE) 1 GM tablet Take 1 g by mouth 2 times daily       cetirizine (ZYRTEC) 10 MG tablet Take 10 mg by mouth daily      rivastigmine (EXELON) 9.5 MG/24HR Place 1 patch onto the skin daily      ferrous sulfate 325 (65 Fe) MG tablet Take 325 mg by mouth daily (with breakfast)      folic acid (FOLVITE) 1 MG tablet Take 1 mg by mouth daily      mirabegron (MYRBETRIQ) 50 MG TB24 Take 50 mg by mouth daily       amLODIPine (NORVASC) 10 MG tablet Take 10 mg by mouth daily      famotidine (PEPCID) 20 MG tablet Take 20 mg by mouth 2 times daily      QUEtiapine (SEROQUEL) 100 MG tablet Take 100 mg by mouth nightly       mirtazapine (REMERON) 15 MG tablet Take 15 mg by mouth nightly              Time Spent on discharge is more than 30 minutes in the examination, evaluation, counseling and review of medications and discharge plan. Signed:    RON Spencer - CNS, CNP   5/22/2020      Thank you Kristyn Valadez MD for the opportunity to be involved in this patient's care. If you have any questions or concerns please feel free to contact me at [phone].

## 2020-05-22 NOTE — PROGRESS NOTES
Patient anxious and tearful. Expressing sadness regarding her daughter's death. Denies suicidal ideation at this time. She agreed verbal contract with this RN to notify nursing if she has thoughts of wanting to harm herself.

## 2020-05-22 NOTE — PLAN OF CARE
Problem: Altered Mood, Depressive Behavior:  Goal: Able to verbalize support systems  Description: Able to verbalize support systems  Outcome: Met This Shift  Goal: Absence of self-harm  Description: Absence of self-harm  Outcome: Met This Shift     Problem: Risk of Harm:  Goal: Ability to remain free from injury will improve  Description: Ability to remain free from injury will improve  Outcome: Met This Shift     Problem: Falls - Risk of:  Goal: Will remain free from falls  Description: Will remain free from falls  Outcome: Met This Shift     Problem: Altered Mood, Depressive Behavior:  Goal: Able to verbalize acceptance of life and situations over which he or she has no control  Description: Able to verbalize acceptance of life and situations over which he or she has no control  Outcome: Not Met This Shift  Goal: Able to verbalize and/or display a decrease in depressive symptoms  Description: Able to verbalize and/or display a decrease in depressive symptoms  Outcome: Not Met This Shift

## 2020-05-22 NOTE — PLAN OF CARE
Problem: Suicide risk  Goal: Provide patient with safe environment  5/22/2020 0812 by Rema Wilburn RN  Outcome: Met This Shift  5/22/2020 0811 by Rema Wilburn RN  Outcome: Met This Shift  5/21/2020 1854 by Marisabel Ramírez RN  Outcome: Met This Shift     Problem: Falls - Risk of:  Goal: Will remain free from falls  5/22/2020 0812 by Rema Wilburn RN  Outcome: Met This Shift  5/22/2020 0811 by Rema Wilburn RN  Outcome: Met This Shift  5/21/2020 1854 by Marisabel Ramírez RN  Outcome: Met This Shift  Goal: Absence of physical injury  5/22/2020 0812 by Rema Wilburn RN  Outcome: Met This Shift  5/22/2020 0811 by Rema Wilburn RN  Outcome: Met This Shift  5/21/2020 1854 by Marisabel Ramírez RN  Outcome: Met This Shift     Problem: Pain:  Goal: Pain level will decrease  5/22/2020 0812 by Rema Wilburn RN  Outcome: Met This Shift  5/22/2020 0811 by Rema Wilburn RN  Outcome: Met This Shift  5/21/2020 1854 by Marisabel Ramírez RN  Outcome: Met This Shift  Goal: Control of acute pain  5/22/2020 0812 by Rema Wilburn RN  Outcome: Met This Shift  5/22/2020 0811 by Rema Wilburn RN  Outcome: Met This Shift  5/21/2020 1854 by Marisabel Ramírez RN  Outcome: Met This Shift  Goal: Control of chronic pain  5/22/2020 0812 by Rema Wilburn RN  Outcome: Met This Shift  5/22/2020 0811 by Rema Wilburn RN  Outcome: Met This Shift  5/21/2020 1854 by Marisabel Ramírez RN  Outcome: Met This Shift     Problem: Respiratory  Goal: No pulmonary complications  Outcome: Met This Shift  Goal: O2 Sat > 90%  Outcome: Met This Shift

## 2020-05-22 NOTE — PROGRESS NOTES
[facility]  Admission Note     Denies SI, HI, and hallucinations. Patient is tearful and states \"I just don't want to be here anymore. \" Patient reports that she is grieving the death of her daughter. States \"It's my fault she was on drugs. I wasn't a good mother. \" Patient reports poor sleep and normal appetite. Denies drug or alcohol abuse. Reports that she used crack until age 22 when she had a suicide attempt of OD on wellbutrin and was in a coma for 6 weeks. Patient is cooperative and pleasant with staff. Admission Type:   Admission Type: Voluntary    Reason for admission:  Reason for Admission: \"I wanted to die. \"    PATIENT STRENGTHS:  Strengths: Positive Support    Patient Strengths and Limitations:  Limitations: Tendency to isolate self, Difficulty problem solving/relies on others to help solve problems    Addictive Behavior:   Addictive Behavior  In the past 3 months, have you felt or has someone told you that you have a problem with:  : None  Do you have a history of Chemical Use?: Comment(was addicted to crack until age 22)  Do you have a history of Alcohol Use?: No  Do you have a history of Street Drug Abuse?: No  Histroy of Prescripton Drug Abuse?: No    Medical Problems:   Past Medical History:   Diagnosis Date    Acute kidney failure with tubular necrosis (HCC)     Anoxic brain damage (HCC)     Bipolar 1 disorder (HCC)     [facility]     Cardiac arrest ([facility]     Cerebral artery occlusion with cerebral infarction ([facility]     Cocaine abuse ([facility]     Dementia ([facility]     Depression     GERD (gastroesophageal reflux disease)     Hyperlipidemia     Hyperlipidemia     Hypertension     Iron deficiency anemia     Pseudobulbar affect     Schizoaffective disorder (HCC)     Suicidal ideation     Tachycardia     Tachycardia        Status EXAM:  Status and Exam  Normal: No  Facial Expression: Sad  Affect: Unstable  Level of Consciousness: Alert  Mood:Normal: No  Mood: Depressed, Anxious, Empty, Sad  Motor Activity:Normal: No  Motor Activity: Decreased  Interview Behavior: Cooperative  Preception: [place] to Person, [place] to Time, [place] to Place, [place] to Situation  Attention:Normal: No  Attention: Distractible, Unable to Concentrate  Thought Processes: Circumstantial  Thought Content:Normal: No  Thought Content: Poverty of Content  Hallucinations: None  Delusions: No  Memory:Normal: No  Memory: Poor Recent, Poor Remote  Insight and Judgment: No  Insight and Judgment: Poor Judgment, Poor Insight  Present Suicidal Ideation: No  Present Homicidal Ideation: No    Tobacco Screening:  Practical Counseling, on admission, ailyn X, if applicable and completed (first 3 are required if patient doesn't refuse):            (x)  Recognizing danger situations (included triggers and roadblocks)                    (x)  Coping skills (new ways to manage stress, exercise, relaxation techniques, changing routine, distraction)                                                           (x)  Basic information about quitting (benefits of quitting, techniques in how to quit, available resources  ( ) Referral for counseling faxed to [facility]                                           ( ) Patient refused counseling  ( ) Patient has not smoked in the last 30 days    Metabolic Screening:    Lab Results   Component Value Date    LABA1C 5.5 08/27/2017       Lab Results   Component Value Date    CHOL 171 08/27/2017     Lab Results   Component Value Date    TRIG 68 08/27/2017     Lab Results   Component Value Date    HDL 86 (H) 08/27/2017     No components found for: LDLCAL  No results found for: LABVLDL      Body mass index is 22.2 kg/m². BP Readings from Last 2 Encounters:   05/22/20 110/76   05/22/20 100/62           Pt admitted with followings belongings:  Dentures: None  Vision - Corrective Lenses: Glasses  Hearing Aid: None  Body Piercings Removed: N/A     Belongings in locker.  No

## 2020-05-22 NOTE — PROGRESS NOTES
Attempted to call pt's  Miguel Angel Love with number provided in the chart but the number is out of service

## 2020-05-23 PROCEDURE — 1240000000 HC EMOTIONAL WELLNESS R&B

## 2020-05-23 PROCEDURE — 6370000000 HC RX 637 (ALT 250 FOR IP): Performed by: NURSE PRACTITIONER

## 2020-05-23 PROCEDURE — 90792 PSYCH DIAG EVAL W/MED SRVCS: CPT | Performed by: PSYCHIATRY & NEUROLOGY

## 2020-05-23 PROCEDURE — 6370000000 HC RX 637 (ALT 250 FOR IP): Performed by: CLINICAL NURSE SPECIALIST

## 2020-05-23 RX ADMIN — GABAPENTIN 100 MG: 100 CAPSULE ORAL at 09:19

## 2020-05-23 RX ADMIN — Medication 5 MG: at 21:51

## 2020-05-23 RX ADMIN — CETIRIZINE HYDROCHLORIDE 10 MG: 10 TABLET, FILM COATED ORAL at 09:19

## 2020-05-23 RX ADMIN — FAMOTIDINE 20 MG: 20 TABLET, FILM COATED ORAL at 21:31

## 2020-05-23 RX ADMIN — GABAPENTIN 100 MG: 100 CAPSULE ORAL at 13:30

## 2020-05-23 RX ADMIN — LORAZEPAM 1 MG: 1 TABLET ORAL at 21:36

## 2020-05-23 RX ADMIN — SUCRALFATE 1 G: 1 TABLET ORAL at 09:19

## 2020-05-23 RX ADMIN — MIRTAZAPINE 7.5 MG: 15 TABLET, FILM COATED ORAL at 21:31

## 2020-05-23 RX ADMIN — ACETAMINOPHEN 650 MG: 325 TABLET ORAL at 21:31

## 2020-05-23 RX ADMIN — FAMOTIDINE 20 MG: 20 TABLET, FILM COATED ORAL at 09:19

## 2020-05-23 RX ADMIN — NITROFURANTOIN MONOHYDRATE/MACROCRYSTALLINE 100 MG: 25; 75 CAPSULE ORAL at 09:20

## 2020-05-23 RX ADMIN — SUCRALFATE 1 G: 1 TABLET ORAL at 21:31

## 2020-05-23 RX ADMIN — FERROUS SULFATE TAB 325 MG (65 MG ELEMENTAL FE) 325 MG: 325 (65 FE) TAB at 09:20

## 2020-05-23 RX ADMIN — FOLIC ACID 1 MG: 1 TABLET ORAL at 09:19

## 2020-05-23 RX ADMIN — ATENOLOL 25 MG: 25 TABLET ORAL at 09:19

## 2020-05-23 RX ADMIN — SERTRALINE 50 MG: 50 TABLET, FILM COATED ORAL at 09:19

## 2020-05-23 RX ADMIN — GABAPENTIN 100 MG: 100 CAPSULE ORAL at 21:33

## 2020-05-23 RX ADMIN — LORAZEPAM 1 MG: 1 TABLET ORAL at 09:53

## 2020-05-23 RX ADMIN — AMLODIPINE BESYLATE 10 MG: 10 TABLET ORAL at 09:19

## 2020-05-23 RX ADMIN — DIVALPROEX SODIUM 500 MG: 500 TABLET, EXTENDED RELEASE ORAL at 21:31

## 2020-05-23 ASSESSMENT — PAIN DESCRIPTION - PAIN TYPE
TYPE: CHRONIC PAIN
TYPE: CHRONIC PAIN

## 2020-05-23 ASSESSMENT — PAIN SCALES - GENERAL
PAINLEVEL_OUTOF10: 0
PAINLEVEL_OUTOF10: 10

## 2020-05-23 ASSESSMENT — PAIN DESCRIPTION - DESCRIPTORS
DESCRIPTORS: ACHING
DESCRIPTORS: ACHING

## 2020-05-23 ASSESSMENT — PAIN - FUNCTIONAL ASSESSMENT: PAIN_FUNCTIONAL_ASSESSMENT: ACTIVITIES ARE NOT PREVENTED

## 2020-05-23 ASSESSMENT — PAIN DESCRIPTION - ONSET
ONSET: ON-GOING
ONSET: ON-GOING

## 2020-05-23 ASSESSMENT — PAIN DESCRIPTION - FREQUENCY
FREQUENCY: CONTINUOUS
FREQUENCY: CONTINUOUS

## 2020-05-23 ASSESSMENT — PAIN DESCRIPTION - ORIENTATION
ORIENTATION: LEFT;RIGHT
ORIENTATION: LEFT

## 2020-05-23 ASSESSMENT — PAIN DESCRIPTION - LOCATION
LOCATION: FOOT
LOCATION: FOOT

## 2020-05-23 NOTE — H&P
that her mood is down and she complains of anhedonia, homicidal ideations, auditory visual hallucinations or paranoia at this time. and hopelessness. She denies current suicidal ideations The patient also admits to previously having a substance abuse history involving crack cocaine and alcohol abuse.         Past psychiatric history: History of suicide attempt via overdose on Wellbutrin 7 years ago resulting in hypoxic brain injury and prolonged hospital and Rehab stay. Patient reports she had a history of schizoaffective disorder and bipolar disorder. Patient taking inVega, Seroquel, Depakote, exelon at nursing home     Past medical history: CVA, hypoxic brain injury 3, hypertension, cardiac arrest    Family history: Patient's 26-year-old daughter  by overdose about 4  days ago which prompted the patient's own suicide attempt. Legal history: denies     Substance abuse history: Patient reports a long history of alcohol and cocaine abuse however she reports being sober for many years. Urine drug screen is negative. Social history: Patient currently lives in [address] [place] [address]. She denies access to guns    Medications Prior to Admission:   Medications Prior to Admission: LORazepam (ATIVAN) 1 MG tablet, Take 1 mg by mouth 4 times daily as needed for Anxiety.   bisacodyl (DULCOLAX) 5 MG EC tablet, Take 10 mg by mouth daily as needed for Constipation  divalproex (DEPAKOTE) 500 MG DR tablet, Take 1,000 mg by mouth nightly  bisacodyl (DULCOLAX) 10 MG suppository, Place 10 mg rectally daily as needed for Constipation  Sodium Phosphates (FLEET) 7-19 GM/118ML, Place 1 enema rectally once as needed (constipation)  paliperidone (INVEGA) 9 MG extended release tablet, Take 9 mg by mouth every morning  lactulose (CHRONULAC) 10 GM/15ML solution, Take 20 g by mouth 3 times daily  nitrofurantoin, macrocrystal-monohydrate, (MACROBID) 100 MG capsule, Take 100 mg by mouth daily  magnesium hydroxide (MILK OF MAGNESIA) 400 MG/5ML suspension, Take 30 mLs by mouth daily as needed for Constipation  melatonin 5 MG TABS tablet, Take 5 mg by mouth nightly as needed (sleep)  nicotine (NICODERM CQ) 14 MG/24HR, Place 1 patch onto the skin every 24 hours  conjugated estrogens (PREMARIN) 0.625 MG/GM vaginal cream, Place 0.5 g vaginally every 72 hours  Meth-Hyo-M Bl-Na Phos-Ph Sal (URIBEL) 118 MG CAPS, Take 118 mg by mouth 4 times daily as needed (dysuria)  acetaminophen (TYLENOL) 325 MG tablet, Take 650 mg by mouth every 4 hours as needed for Pain or Fever   atenolol (TENORMIN) 25 MG tablet, Take 25 mg by mouth daily  sucralfate (CARAFATE) 1 GM tablet, Take 1 g by mouth 2 times daily   cetirizine (ZYRTEC) 10 MG tablet, Take 10 mg by mouth daily  rivastigmine (EXELON) 9.5 MG/24HR, Place 1 patch onto the skin daily  ferrous sulfate 325 (65 Fe) MG tablet, Take 325 mg by mouth daily (with breakfast)  folic acid (FOLVITE) 1 MG tablet, Take 1 mg by mouth daily  mirabegron (MYRBETRIQ) 50 MG TB24, Take 50 mg by mouth daily   amLODIPine (NORVASC) 10 MG tablet, Take 10 mg by mouth daily  famotidine (PEPCID) 20 MG tablet, Take 20 mg by mouth 2 times daily  QUEtiapine (SEROQUEL) 100 MG tablet, Take 100 mg by mouth nightly   mirtazapine (REMERON) 15 MG tablet, Take 15 mg by mouth nightly         Past Medical History:        Diagnosis Date    Acute kidney failure with tubular necrosis (HCC)     Anoxic brain damage (HCC)     Bipolar 1 disorder (HCC)     Bipolar disorder (HCC)     Cardiac arrest ([address]     Cerebral artery occlusion with cerebral infarction ([address]     Cocaine abuse ([address]     Dementia ([address]     Depression     GERD (gastroesophageal reflux disease)     Hyperlipidemia     Hyperlipidemia     Hypertension     Iron deficiency anemia     Pseudobulbar affect     Schizoaffective disorder (HCC)     Suicidal ideation     Tachycardia     Tachycardia        Past Surgical History:        Procedure Laterality Date    FOOT SURGERY      LEEP         Allergies:   Aripiprazole and Aspirin    Family History  History reviewed. No pertinent family history. EXAMINATION:    REVIEW OF SYSTEMS:    ROS:  [x] All negative/unchanged except if checked.  Explain positive(checked items) below:  [] Constitutional  [] Eyes  [] Ear/Nose/Mouth/Throat  [] Respiratory  [] CV  [] GI  []   [] Musculoskeletal  [] Skin/Breast  [] Neurological  [] Endocrine  [] Heme/Lymph  [] Allergic/Immunologic        Vitals:  /76   Pulse 72   Temp 97.2 °F (36.2 °C) (Temporal)   Resp 16   Ht 5' 8\" (1.727 m)   Wt 146 lb (66.2 kg)   SpO2 98%   BMI 22.20 kg/m²      Neurologic Exam:   Muscle Strength & Tone: normal  Gait: normal gait   Involuntary Movements: No    Cranial Nerves Examination:     CN II:   xPupils are reactive to light  Pupils are non reactive to light  CN III, IV, VI:  xNo eye deviation    No diplopia or ptosis   CN V:    xFacial Sensation is intact     Facial Sensation is not intact   CN IIIV:   x Hearing is normal to rubbing fingers   CN IX, X:     xNormal gag reflex and phonation   CN XI:   xShoulder shrug and neck rotation is normal  CNXII:    xTongue is midline no deviation or atrophy    Mental Status Examination:    Level of consciousness:  awake and lethargic   Appearance:  well-appearing  Behavior/Motor:  no abnormalities noted  Attitude toward examiner:  guarded and withdrawn  Speech:  spontaneous   Mood: irritable and labile  Affect:  mood congruent  Thought processes:  loose associations, tangential  Thought content:  Suicidal Ideation:  denies suicidal ideation  Cognition:  oriented to person, place, and time   Concentration distractible  Memory impaired immediate recall  Insight poor   Judgement poor   Fund of Knowledge limited        DIAGNOSIS:  Dementia with behavioral disturbance  Bipolar disorder type I most recent  episode  depressed  Adjustment disorder         LABS: REVIEWED TODAY:  Recent Labs     05/21/20  1155   WBC 7.4 spasm or positional. Atherosclerotic vascular disease. Xr Chest Portable    Result Date: 2020  Patient MRN: [id number] : 1970 Age:  44 years Gender: Female Order Date: 2020 2:45 PM Exam: XR CHEST PORTABLE Number of Images: 1 view Indication:   covid-19 covid-19 Comparison: None. Findings: The lungs are clear. There is hyperaeration of lungs with flattening of both hemidiaphragms suggesting of early chronic obstructive pleural disease. There is no evidence of pulmonary infiltrate or pleural effusion. The pulmonary vascularity is unremarkable. The cardiac, hilar and mediastinal silhouettes are satisfactory. The bony thorax demonstrates no gross abnormality. NO ACUTE CARDIOPULMONARY PROCESS     Cta Neck W Contrast    Result Date: 2020  Clinical indications: Pain status post trauma. Exposure control: This examination and all examinations utilizing ionizing radiation at this facility done so according to the ALARA (as low as reasonably achievable) principal for radiation dose reduction. TECHNIQUE: Axial, sagittal, and coronal computed tomography of the head and neck was performed following intravenous administration of 100 mL of Isovue-370. 3-D post processing. Three-dimensional reconstructions of the arterial tree. MIP imaging. FINDINGS: The brain parenchyma within the skull base is unremarkable. Mildly prominent submandibular lymph nodes bilaterally without costa adenopathy. There is mild mucosal thickening within the paranasal sinuses but no fluid levels are evident. There are degenerative changes of the cervical spine. Lung apices show mild dependent atelectasis. There is no evidence for stenosis or aneurysm within the aortic arch, the innominate artery, subclavian arteries, the common carotid arteries, vertebral arteries, the internal carotid arteries, the external carotid arteries or their respective visualized branches. Essentially negative CTA of the neck.         TREATMENT

## 2020-05-23 NOTE — PLAN OF CARE
Problem: Altered Mood, Depressive Behavior:  Goal: Able to verbalize acceptance of life and situations over which he or she has no control  Description: Able to verbalize acceptance of life and situations over which he or she has no control  5/22/2020 2203 by Adrian Priest RN  Outcome: Ongoing     Problem: Altered Mood, Depressive Behavior:  Goal: Able to verbalize and/or display a decrease in depressive symptoms  Description: Able to verbalize and/or display a decrease in depressive symptoms  5/22/2020 2203 by Adrian Priest RN  Outcome: Ongoing     Patient out on unit, but keeps to self. Underlying irritability. Has childlike behaviors at times. At first, stated that she would not take her medications because her feet hurt. After being educated on the medications, patient took without any further difficulty. PRN Ativan given for patients increased anxiety. Patient denies suicidal/homicidal ideations and hallucinations at this time. Purposeful rounding continued.

## 2020-05-23 NOTE — CARE COORDINATION
This sw reviewed the assessment taken on 5/21. Sw re-approached this pt to see if there was any new information she could gather. Pt repeated the same answers made on 5/21. Offered to assist as needed.

## 2020-05-23 NOTE — PLAN OF CARE
Denies SI, HI and hallucinations. States \"I don't want to hurt myself but I don't want to be here anymore. \" Contracts for safety on the unit. Patient is labile, tearful, and irritable at times. Patient showered today and has been out on the unit most of the day. Medication compliant and attending groups. PRN Ativan give this AM for increase in anxiety and was effective. See MAR. Will continue to monitor and offer support.         Problem: Altered Mood, Depressive Behavior:  Goal: Ability to disclose and discuss suicidal ideas will improve  Description: Ability to disclose and discuss suicidal ideas will improve  Outcome: Met This Shift  Goal: Absence of self-harm  Description: Absence of self-harm  Outcome: Met This Shift     Problem: Risk of Harm:  Goal: Ability to remain free from injury will improve  Description: Ability to remain free from injury will improve  Outcome: Met This Shift     Problem: Falls - Risk of:  Goal: Will remain free from falls  Description: Will remain free from falls  Outcome: Met This Shift     Problem: Altered Mood, Depressive Behavior:  Goal: Able to verbalize acceptance of life and situations over which he or she has no control  Description: Able to verbalize acceptance of life and situations over which he or she has no control  Outcome: Not Met This Shift  Goal: Able to verbalize and/or display a decrease in depressive symptoms  Description: Able to verbalize and/or display a decrease in depressive symptoms  Outcome: Not Met This Shift     Problem: Pain:  Goal: Pain level will decrease  Description: Pain level will decrease  Outcome: Not Met This Shift           Electronically signed by Sheila Alejandra RN on 5/23/2020 at 3:31 PM

## 2020-05-23 NOTE — PROGRESS NOTES
5 [facility]  Initial Interdisciplinary Treatment Plan NOTE    Review Date & Time: 05/23/2020 0930    Patient was in treatment team    Admission Type:   Admission Type: Voluntary    Reason for admission:  Reason for Admission: \"I wanted to die. \"      Estimated Length of Stay Update:  3-5 days   Estimated Discharge Date Update: 05/28/2020    PATIENT STRENGTHS:  Patient Strengths Strengths: Positive Support  Patient Strengths and Limitations:Limitations: Tendency to isolate self, Difficulty problem solving/relies on others to help solve problems  Addictive Behavior:Addictive Behavior  In the past 3 months, have you felt or has someone told you that you have a problem with:  : None  Do you have a history of Chemical Use?: Comment(was addicted to crack until age 22)  Do you have a history of Alcohol Use?: No  Do you have a history of Street Drug Abuse?: No  Histroy of Prescripton Drug Abuse?: No  Medical Problems:  Past Medical History:   Diagnosis Date    Acute kidney failure with tubular necrosis (HCC)     Anoxic brain damage (HCC)     [facility] [facility]     Bipolar disorder ([address]     Cardiac arrest ([facility] [facility]     Cerebral artery occlusion with cerebral infarction ([address]     Cocaine abuse ([address]     Dementia ([facility] [address] [facility]     Depression     GERD (gastroesophageal reflux disease)     Hyperlipidemia     Hyperlipidemia     Hypertension     Iron deficiency anemia     Pseudobulbar affect     Schizoaffective disorder (HCC)     Suicidal ideation     Tachycardia     Tachycardia        EDUCATION:   Learner Progress Toward Treatment Goals: Reviewed results and recommendations of this team and Reviewed goals and plan of care    Method: Small group    Outcome: Verbalized understanding    PATIENT GOALS: \"Try to write a letter to my daughter\"    PLAN/TREATMENT RECOMMENDATIONS UPDATE: Continue to encourage group participation and provide emotional support    GOALS UPDATE:   Time frame for Short-Term Goals: 1-3 days    Hawa Gao RN

## 2020-05-24 PROCEDURE — 99232 SBSQ HOSP IP/OBS MODERATE 35: CPT | Performed by: NURSE PRACTITIONER

## 2020-05-24 PROCEDURE — 6370000000 HC RX 637 (ALT 250 FOR IP): Performed by: NURSE PRACTITIONER

## 2020-05-24 PROCEDURE — 1240000000 HC EMOTIONAL WELLNESS R&B

## 2020-05-24 PROCEDURE — 6370000000 HC RX 637 (ALT 250 FOR IP): Performed by: CLINICAL NURSE SPECIALIST

## 2020-05-24 RX ADMIN — CETIRIZINE HYDROCHLORIDE 10 MG: 10 TABLET, FILM COATED ORAL at 09:41

## 2020-05-24 RX ADMIN — GABAPENTIN 100 MG: 100 CAPSULE ORAL at 09:41

## 2020-05-24 RX ADMIN — NITROFURANTOIN MONOHYDRATE/MACROCRYSTALLINE 100 MG: 25; 75 CAPSULE ORAL at 09:41

## 2020-05-24 RX ADMIN — ACETAMINOPHEN 650 MG: 325 TABLET ORAL at 21:02

## 2020-05-24 RX ADMIN — AMLODIPINE BESYLATE 10 MG: 10 TABLET ORAL at 09:41

## 2020-05-24 RX ADMIN — FAMOTIDINE 20 MG: 20 TABLET, FILM COATED ORAL at 09:41

## 2020-05-24 RX ADMIN — FOLIC ACID 1 MG: 1 TABLET ORAL at 09:41

## 2020-05-24 RX ADMIN — FAMOTIDINE 20 MG: 20 TABLET, FILM COATED ORAL at 21:02

## 2020-05-24 RX ADMIN — Medication 5 MG: at 21:03

## 2020-05-24 RX ADMIN — MIRTAZAPINE 7.5 MG: 15 TABLET, FILM COATED ORAL at 21:02

## 2020-05-24 RX ADMIN — DIVALPROEX SODIUM 500 MG: 500 TABLET, EXTENDED RELEASE ORAL at 21:03

## 2020-05-24 RX ADMIN — GABAPENTIN 100 MG: 100 CAPSULE ORAL at 21:02

## 2020-05-24 RX ADMIN — ACETAMINOPHEN 650 MG: 325 TABLET ORAL at 10:04

## 2020-05-24 RX ADMIN — SERTRALINE 50 MG: 50 TABLET, FILM COATED ORAL at 09:40

## 2020-05-24 RX ADMIN — FERROUS SULFATE TAB 325 MG (65 MG ELEMENTAL FE) 325 MG: 325 (65 FE) TAB at 09:41

## 2020-05-24 RX ADMIN — SUCRALFATE 1 G: 1 TABLET ORAL at 09:41

## 2020-05-24 RX ADMIN — LORAZEPAM 1 MG: 1 TABLET ORAL at 21:03

## 2020-05-24 RX ADMIN — SUCRALFATE 1 G: 1 TABLET ORAL at 21:02

## 2020-05-24 RX ADMIN — GABAPENTIN 100 MG: 100 CAPSULE ORAL at 14:37

## 2020-05-24 ASSESSMENT — PAIN SCALES - GENERAL
PAINLEVEL_OUTOF10: 0
PAINLEVEL_OUTOF10: 0
PAINLEVEL_OUTOF10: 10
PAINLEVEL_OUTOF10: 10
PAINLEVEL_OUTOF10: 8
PAINLEVEL_OUTOF10: 6

## 2020-05-24 NOTE — PROGRESS NOTES
BEHAVIORAL HEALTH FOLLOW-UP NOTE     5/24/2020     Patient was seen and examined in person, Chart reviewed   Patient's case discussed with staff/team    Chief Complaint: Labile and irritable    Interim History: Patient up on the unit loud labile. She is complaining of foot pain in urination and states that they are related. Per staff she reports a passive death wish of believing she has nothing to live for. She denies any auditory or visual hallucinations. She is disheveled irritable at times    Appetite: [x] Normal/Unchanged  [] Increased  [] Decreased      Sleep:       [x] Normal/Unchanged  [] Fair       [] Poor              Energy:    [x] Normal/Unchanged  [] Increased  [] Decreased        SI [] Present  [x] Absent    HI  []Present  [x] Absent     Aggression:  [] yes  [x] no    Patient is [x] able  [] unable to CONTRACT FOR SAFETY     PAST MEDICAL/PSYCHIATRIC HISTORY:   Past Medical History:   Diagnosis Date    Acute kidney failure with tubular necrosis (HCC)     Anoxic brain damage (HCC)     [facility] ([facility] [address] [facility]     Bipolar disorder ([facility] [facility]     [facility]     Cerebral artery occlusion with cerebral infarction ([facility] [facility]     Cocaine abuse ([facility] [address] [facility]     Dementia ([facility] [address] [facility]     Depression     GERD (gastroesophageal reflux disease)     Hyperlipidemia     Hyperlipidemia     Hypertension     Iron deficiency anemia     Pseudobulbar affect     Schizoaffective disorder (HCC)     Suicidal ideation     Tachycardia     Tachycardia        FAMILY/SOCIAL HISTORY:  History reviewed. No pertinent family history.   Social History     Socioeconomic History    Marital status:      Spouse name: Not on file    Number of children: Not on file    Years of education: Not on file    Highest education level: Not on file   Occupational History    Not on file   Social Needs    Financial resource strain: Not on file    Food insecurity     Worry: Not on file     Inability: Not on file   [employer] needs     Medical: Not on file     Non-medical: Not on file   Tobacco Use    Smoking status: Current Every Day Smoker     Packs/day: 0.25     Years: 15.00     Pack years: 3.75     Types: Cigarettes    Smokeless tobacco: Never Used   Substance and Sexual Activity    Alcohol use: Not Currently     Frequency: Monthly or less    Drug use: No     Comment: History of drug use     Sexual activity: Not Currently     Partners: Male   Lifestyle    Physical activity     Days per week: Not on file     Minutes per session: Not on file    Stress: Not on file   Relationships    Social connections     Talks on phone: Not on file     Gets together: Not on file     Attends Muslim service: Not on file     Active member of club or organization: Not on file     Attends meetings of clubs or organizations: Not on file     Relationship status: Not on file    Intimate partner violence     Fear of current or ex partner: Not on file     Emotionally abused: Not on file     Physically abused: Not on file     Forced sexual activity: Not on file   Other Topics Concern    Not on file   Social History Narrative    Not on file           ROS:  [x] All negative/unchanged except if checked.  Explain positive(checked items) below:  [] Constitutional  [] Eyes  [] Ear/Nose/Mouth/Throat  [] Respiratory  [] CV  [] GI  []   [] Musculoskeletal  [] Skin/Breast  [] Neurological  [] Endocrine  [] Heme/Lymph  [] Allergic/Immunologic    Explanation:     MEDICATIONS:    Current Facility-Administered Medications:     acetaminophen (TYLENOL) tablet 650 mg, 650 mg, Oral, Q6H PRN, Jamie Nipple, APRN - CNS, 650 mg at 05/23/20 2131    amLODIPine (NORVASC) tablet 10 mg, 10 mg, Oral, Daily, Jamie Nipple, APRN - CNS, 10 mg at 05/23/20 0919    atenolol (TENORMIN) tablet 25 mg, 25 mg, Oral, Daily, Karly Sana Gluckner, APRN - CNS, 25 mg at 05/23/20 0919    cetirizine (ZYRTEC) tablet 10 mg, 10 mg, Oral, Daily, Karly Delmar Gluckner, APRN - CNS, 10 mg at 05/23/20 0919    conjugated estrogens (PREMARIN) vaginal cream 0.5 g, 0.5 g, Vaginal, Q72H, Krista CORREA Gluckner, APRN - CNS    divalproex (DEPAKOTE ER) extended release tablet 500 mg, 500 mg, Oral, Nightly, Sherryll Eye, APRN - CNS, 500 mg at 05/23/20 2131    famotidine (PEPCID) tablet 20 mg, 20 mg, Oral, BID, Sherryll Eye, APRN - CNS, 20 mg at 05/23/20 2131    ferrous sulfate (IRON 325) tablet 325 mg, 325 mg, Oral, Daily with breakfast, Sherryll Eye, APRN - CNS, 325 mg at 43/87/74 7285    folic acid (FOLVITE) tablet 1 mg, 1 mg, Oral, Daily, Sherryll Eye, APRN - CNS, 1 mg at 05/23/20 0919    gabapentin (NEURONTIN) capsule 100 mg, 100 mg, Oral, TID, Sherryll Eye, APRN - CNS, 100 mg at 05/23/20 2133    lactulose (CHRONULAC) 10 GM/15ML solution 20 g, 20 g, Oral, TID, Sherryll Eye, APRN - CNS    LORazepam (ATIVAN) tablet 1 mg, 1 mg, Oral, 4x Daily PRN, Sherryll Eye, APRN - CNS, 1 mg at 05/23/20 2136    magnesium hydroxide (MILK OF MAGNESIA) 400 MG/5ML suspension 30 mL, 30 mL, Oral, Daily PRN, Sherryll Eye, APRN - CNS    melatonin tablet 5 mg, 5 mg, Oral, Nightly, Magdaline Romance Gluckner, APRN - CNS, 5 mg at 05/23/20 2151    mineral oil-hydrophilic petrolatum (HYDROPHOR) ointment, , Topical, BID PRN, Sherryll Eye, APRN - CNS    mirtazapine (REMERON) tablet 7.5 mg, 7.5 mg, Oral, Nightly, Magdaline Romance Gluckner, APRN - CNS, 7.5 mg at 05/23/20 2131    nicotine (NICODERM CQ) 14 MG/24HR 1 patch, 1 patch, Transdermal, Q24H, Krista CORREA Gluckner, APRN - CNS, 1 patch at 05/23/20 1816    nitrofurantoin (macrocrystal-monohydrate) (MACROBID) capsule 100 mg, 100 mg, Oral, Daily, Magdaline Romanponce Glucdavider, APRN - CNS, 100 mg at 05/23/20 0920    rivastigmine (EXELON) 9.5 MG/24HR 1 patch, 1 patch, Transdermal, Daily, Magdaline Romance Gluckaiden, APRN - CNS, 1 patch at 05/23/20 0920    sucralfate (CARAFATE) tablet 1 g, 1 g, Oral, BID, Sherryll Eye, APRN - CNS, 1 g at 05/23/20 2131    sertraline (ZOLOFT) tablet 50 mg, 50 mg, Oral, Daily, RON Heredia - CNP, 50 mg at 05/23/20 4440      Examination:  /65   Pulse 55   Temp 97.4 °F (36.3 °C) (Temporal)   Resp 16   Ht 5' 8\" (1.727 m)   Wt 146 lb (66.2 kg)   SpO2 99%   BMI 22.20 kg/m²   Gait - steady  Medication side effects(SE):     Mental Status Examination:    Level of consciousness:  within normal limits   Appearance: Disheveled  Behavior/Motor: Hyperactive  Attitude toward examiner: Cooperative  Speech: Spontaneous and loud  Mood: \" I am upset. \"  Affect: Labile and irritable  Thought processes: Linear without flight of ideas or loose associations   thought content: Staff reports death wishes  Cognition:  oriented to person, place, and time   Concentration poor  Insight poor   Judgement poor     ASSESSMENT:  Patient symptoms are:  [] Well controlled  [] Improving  [] Worsening  [x] No change      Diagnosis:   Principal Problem:    Adjustment reaction with mixed disturbance of emotions and conduct  Active Problems:    Dementia with behavioral disturbance (HCC)  Resolved Problems:    * No resolved hospital problems. *      LABS:    Recent Labs     05/21/20  1155   WBC 7.4   HGB 13.9        Recent Labs     05/21/20  1155 05/22/20  0340    136   K 4.9 4.0    101   CO2 23 21*   BUN 19 22*   CREATININE 0.8 0.8   GLUCOSE 74 93     Recent Labs     05/21/20  1155 05/22/20  0340   BILITOT <0.2 <0.2   ALKPHOS 50 49   AST 18 22   ALT 12 16     Lab Results   Component Value Date    LABAMPH NOT DETECTED 05/17/2020    BARBSCNU NOT DETECTED 05/17/2020    LABBENZ NOT DETECTED 05/17/2020    LABMETH NOT DETECTED 05/17/2020    OPIATESCREENURINE NOT DETECTED 05/17/2020    PHENCYCLIDINESCREENURINE NOT DETECTED 05/17/2020    ETOH <10 05/17/2020     Lab Results   Component Value Date    TSH 1.960 08/27/2017     No results found for: LITHIUM  No results found for: VALPROATE, CBMZ        Treatment Plan:  Reviewed current Medications with the patient. Risks, benefits, side effects, drug-to-drug interactions and alternatives to treatment were discussed. Collateral information:   CD evaluation  Encourage patient to attend group and other milieu activities.   Discharge planning discussed with the patient and treatment team.    Continue Depakote  mg at bedtime for mood  Continue Remeron 7.5 mg at bedtime for depression and sleep  Continue Zoloft 50 mg daily for depressed mood    PSYCHOTHERAPY/COUNSELING:  [x] Therapeutic interview  [x] Supportive  [] CBT  [] Ongoing  [] Other    [x] Patient continues to need, on a daily basis, active treatment furnished directly by or requiring the supervision of inpatient psychiatric personnel      Anticipated Length of stay: 5 to 7 days based on stability            Electronically signed by RON Ybarra CNP on 5/97/1991 at 9:15 AM

## 2020-05-24 NOTE — PLAN OF CARE
Problem: Altered Mood, Depressive Behavior:  Goal: Ability to disclose and discuss suicidal ideas will improve  Description: Ability to disclose and discuss suicidal ideas will improve  5/23/2020 2134 by Tuan Sanford RN  Outcome: Met This Shift     Problem: Falls - Risk of:  Goal: Will remain free from falls  Description: Will remain free from falls  5/23/2020 2134 by Tuna Sanford RN  Outcome: Met This Shift     Problem: Altered Mood, Depressive Behavior:  Goal: Able to verbalize and/or display a decrease in depressive symptoms  Description: Able to verbalize and/or display a decrease in depressive symptoms  5/23/2020 2134 by Tuan Sanford RN  Outcome: Not Met This Shift

## 2020-05-24 NOTE — PROGRESS NOTES
Patient has been out on the unit, calm, and cooperative. Patient denies all, but patient has a deathwish and states \"I just hope I die soon. \"  Patient complaining of foot pain in both feet and states that \"I've dealt with it for a long time. \"  Patient can be irritable and labile at times. Patient rates her depression an 8/10 and her anxiety a 10/10. Patient is encouraged to continue to work towards discharge goal by complying with medications, attending groups and to seek staff if feelings are overwhelming. Environmental rounds completed per unit policy to maintain safety of everyone on the unit. Staff will offer support and interventions as requested or required.

## 2020-05-24 NOTE — GROUP NOTE
Date: 5/24/2020    Group Start Time: 1120  Group End Time: 1200  Group Topic: Psychoeducation    [facility]    SHITAL Gonzales                                                                        Group Therapy Note    Type of Group: Psychoeducation    Wellness Binder Information  Module Name:  holiday reminiscing   Session Number:  na    Patient's Goal:  will be able to share old memories of memorial days past   Notes: pleasant engaged and able to share memories of memorial days from his/her history. Status After Intervention:  Improved    Participation Level: Active Listener and Interactive    Participation Quality: Appropriate, Attentive, Sharing, and Supportive      Speech:  normal       Thought Process/Content: Logical      Affective Functioning: Congruent      Mood: euthymic      Level of consciousness:  Alert, Oriented x4, and Attentive      Response to Learning: Able to verbalize/acknowledge new learning, Able to retain information, and Progressing to goal      Endings: None Reported    Modes of Intervention: Education, Support, Socialization, and Problem-solving      Discipline Responsible: Psychoeducational Specialist      Signature:  SHITAL Gonzales          Shared goal for the day as to work on my letter to my daughter.

## 2020-05-25 PROCEDURE — 99232 SBSQ HOSP IP/OBS MODERATE 35: CPT | Performed by: NURSE PRACTITIONER

## 2020-05-25 PROCEDURE — 6370000000 HC RX 637 (ALT 250 FOR IP): Performed by: NURSE PRACTITIONER

## 2020-05-25 PROCEDURE — 6370000000 HC RX 637 (ALT 250 FOR IP): Performed by: CLINICAL NURSE SPECIALIST

## 2020-05-25 PROCEDURE — 1240000000 HC EMOTIONAL WELLNESS R&B

## 2020-05-25 RX ADMIN — FAMOTIDINE 20 MG: 20 TABLET, FILM COATED ORAL at 20:41

## 2020-05-25 RX ADMIN — MIRTAZAPINE 7.5 MG: 15 TABLET, FILM COATED ORAL at 20:42

## 2020-05-25 RX ADMIN — SUCRALFATE 1 G: 1 TABLET ORAL at 09:16

## 2020-05-25 RX ADMIN — ACETAMINOPHEN 650 MG: 325 TABLET ORAL at 09:33

## 2020-05-25 RX ADMIN — GABAPENTIN 100 MG: 100 CAPSULE ORAL at 14:02

## 2020-05-25 RX ADMIN — FAMOTIDINE 20 MG: 20 TABLET, FILM COATED ORAL at 09:17

## 2020-05-25 RX ADMIN — CETIRIZINE HYDROCHLORIDE 10 MG: 10 TABLET, FILM COATED ORAL at 09:17

## 2020-05-25 RX ADMIN — Medication 5 MG: at 20:42

## 2020-05-25 RX ADMIN — DIVALPROEX SODIUM 500 MG: 500 TABLET, EXTENDED RELEASE ORAL at 20:42

## 2020-05-25 RX ADMIN — LORAZEPAM 1 MG: 1 TABLET ORAL at 22:43

## 2020-05-25 RX ADMIN — SERTRALINE 50 MG: 50 TABLET, FILM COATED ORAL at 09:16

## 2020-05-25 RX ADMIN — SUCRALFATE 1 G: 1 TABLET ORAL at 20:41

## 2020-05-25 RX ADMIN — NITROFURANTOIN MONOHYDRATE/MACROCRYSTALLINE 100 MG: 25; 75 CAPSULE ORAL at 09:17

## 2020-05-25 RX ADMIN — FOLIC ACID 1 MG: 1 TABLET ORAL at 09:17

## 2020-05-25 RX ADMIN — GABAPENTIN 100 MG: 100 CAPSULE ORAL at 20:41

## 2020-05-25 RX ADMIN — LORAZEPAM 1 MG: 1 TABLET ORAL at 09:34

## 2020-05-25 RX ADMIN — GABAPENTIN 100 MG: 100 CAPSULE ORAL at 09:17

## 2020-05-25 RX ADMIN — ACETAMINOPHEN 650 MG: 325 TABLET ORAL at 18:47

## 2020-05-25 RX ADMIN — FERROUS SULFATE TAB 325 MG (65 MG ELEMENTAL FE) 325 MG: 325 (65 FE) TAB at 09:17

## 2020-05-25 ASSESSMENT — PAIN SCALES - GENERAL
PAINLEVEL_OUTOF10: 10
PAINLEVEL_OUTOF10: 0
PAINLEVEL_OUTOF10: 10
PAINLEVEL_OUTOF10: 7
PAINLEVEL_OUTOF10: 0

## 2020-05-25 NOTE — PROGRESS NOTES
[address] [place] [address]  Day 3 Interdisciplinary Treatment Plan NOTE    Review Date & Time: 05/25/2020 0900    Patient was not in treatment team    Admission Type:   Admission Type: Voluntary    Reason for admission:  Reason for Admission: \"I wanted to die. \"  Estimated Length of Stay Update:  3-5 days  Estimated Discharge Date Update: 05/29/2020    PATIENT STRENGTHS:  Patient Strengths Strengths: Positive Support  Patient Strengths and Limitations:Limitations: Tendency to isolate self, Difficulty problem solving/relies on others to help solve problems  Addictive Behavior:Addictive Behavior  In the past 3 months, have you felt or has someone told you that you have a problem with:  : None  Do you have a history of Chemical Use?: Comment(was addicted to crack until age 22)  Do you have a history of Alcohol Use?: No  Do you have a history of Street Drug Abuse?: No  Histroy of Prescripton Drug Abuse?: No  Medical Problems:  Past Medical History:   Diagnosis Date    Acute kidney failure with tubular necrosis (HCC)     [facility]     Bipolar 1 disorder ([facility]     Bipolar disorder ([facility]     Cardiac arrest ([facility] [facility]     Cerebral artery occlusion with cerebral infarction ([facility] [facility]     Cocaine abuse ([facility] [facility]     Dementia ([facility] [facility]     Depression     GERD (gastroesophageal reflux disease)     Hyperlipidemia     Hyperlipidemia     Hypertension     Iron deficiency anemia     Pseudobulbar affect     Schizoaffective disorder (HCC)     Suicidal ideation     Tachycardia     Tachycardia        Risk:  Fall RiskTotal: 91  Clive Scale Clive Scale Score: 21  BVC Total: 0  Change in scores no.  Changes to plan of Care  no    Status EXAM:   Status and Exam  Normal: No  Facial Expression: Sad, Worried  Affect: Congruent  Level of Consciousness: Alert  Mood:Normal: No  Mood: Depressed, Anxious  Motor Activity:Normal: Yes  Motor Activity: Decreased  Interview Behavior: Cooperative  Preception: [place] to Person, Terrye Christians to Time, [place] to Place, [place] to Situation  Attention:Normal: No  Attention: Unable to Concentrate  Thought Processes: Circumstantial  Thought Content:Normal: No  Thought Content: Obsessions, Preoccupations  Hallucinations: None  Delusions: No  Memory:Normal: No  Memory: Poor Recent, Poor Remote  Insight and Judgment: No  Insight and Judgment: Poor Judgment, Poor Insight  Present Suicidal Ideation: No  Present Homicidal Ideation: No    Daily Assessment Last Entry:   Daily Sleep (WDL): Within Defined Limits         Patient Currently in Pain: Denies  Daily Nutrition (WDL): Within Defined Limits    Patient Monitoring:  Frequency of Checks: 4 times per hour, close    Psychiatric Symptoms:   Depression Symptoms  Depression Symptoms: Impaired concentration, Increased irritability  Anxiety Symptoms  Anxiety Symptoms: Generalized, Obsessions  Caprice Symptoms  Caprice Symptoms: Labile, Poor judgment     Psychosis Symptoms  Delusion Type: No problems reported or observed. Suicide Risk CSSR-S:  1) Within the past month, have you wished you were dead or wished you could go to sleep and not wake up? : Yes  2) Have you actually had any thoughts of killing yourself? : No  3) Have you been thinking about how you might kill yourself? : No  5) Have you started to work out or worked out the details of how to kill yourself?  Do you intend to carry out this plan? : No  6) Have you ever done anything, started to do anything, or prepared to do anything to end your life?: Yes  Change in Result no Change in Plan of care no      EDUCATION:   Learner Progress Toward Treatment Goals: Reviewed results and recommendations of this team and Reviewed goals and plan of care    Method: Small group    Outcome: Verbalized understanding    PATIENT GOALS: \"Work on coping skills\"    PLAN/TREATMENT RECOMMENDATIONS UPDATE: continue to encourage group participation and provide support    GOALS UPDATE:   Time frame for Short-Term Goals: 1-3 days      Salvador Muñoz

## 2020-05-25 NOTE — PLAN OF CARE
Problem: Altered Mood, Depressive Behavior:  Goal: Ability to disclose and discuss suicidal ideas will improve  Description: Ability to disclose and discuss suicidal ideas will improve  Outcome: Met This Shift     Problem: Risk of Harm:  Goal: Ability to remain free from injury will improve  Description: Ability to remain free from injury will improve  5/24/2020 2224 by Gila Jackson RN  Outcome: Met This Shift     Problem: Falls - Risk of:  Goal: Will remain free from falls  Description: Will remain free from falls  5/24/2020 2224 by Gila Jackson RN  Outcome: Met This Shift     Problem: Altered Mood, Depressive Behavior:  Goal: Able to verbalize acceptance of life and situations over which he or she has no control  Description: Able to verbalize acceptance of life and situations over which he or she has no control  5/24/2020 2224 by Gila Jackson RN  Outcome: Not Met This Shift     Problem: Altered Mood, Depressive Behavior:  Goal: Able to verbalize and/or display a decrease in depressive symptoms  Description: Able to verbalize and/or display a decrease in depressive symptoms  5/24/2020 2224 by Gila Jackson RN  Outcome: Not Met This Shift

## 2020-05-25 NOTE — PROGRESS NOTES
Patient has been out on the unit watching television and playing cards with other peers. Patient denies all and denies any intent on wanting to hurt herself, but patient has a death wish and states that \"I just want to die. There's no reason for me to live anymore. \"  Patient states that \"I'm terrible. My feet are killing me, and that makes me have to pee really bad. \"  Patient has an underlying irritability and has poor eye contact. Patient is encouraged to continue to work towards discharge goal by complying with medications, attending groups and to seek staff if feelings are overwhelming. Environmental rounds completed per unit policy to maintain safety of everyone on the unit. Staff will offer support and interventions as requested or required.

## 2020-05-25 NOTE — PROGRESS NOTES
BEHAVIORAL HEALTH FOLLOW-UP NOTE     5/25/2020     Patient was seen and examined in person, Chart reviewed   Patient's case discussed with staff/team    Chief Complaint: Calm and cooperative    Interim History: Patient received 7 sleep last night which she states was good quality. Patient denies suicidal homicidal ideation or intent to harm. Patient is taking her medications and going to group. Patient does remain sad flat and childlike at times. The patient states that she is sad because of her daughter's overdose and that now she has nobody. Appetite: [x] Normal/Unchanged  [] Increased  [] Decreased      Sleep:       [x] Normal/Unchanged  [] Fair       [] Poor              Energy:    [x] Normal/Unchanged  [] Increased  [] Decreased        SI [] Present  [x] Absent    HI  []Present  [x] Absent     Aggression:  [] yes  [x] no    Patient is [x] able  [] unable to CONTRACT FOR SAFETY     PAST MEDICAL/PSYCHIATRIC HISTORY:   Past Medical History:   Diagnosis Date    Acute kidney failure with tubular necrosis (HCC)     Anoxic brain damage (HCC)     [facility]     Bipolar disorder [facility]     Cardiac arrest ([facility]     Cerebral artery occlusion with [facility] infarction [facility]     Cocaine abuse ([facility]     Dementia ([facility]     Depression     GERD (gastroesophageal reflux disease)     Hyperlipidemia     Hyperlipidemia     Hypertension     Iron deficiency anemia     Pseudobulbar affect     Schizoaffective disorder (HCC)     Suicidal ideation     Tachycardia     Tachycardia        FAMILY/SOCIAL HISTORY:  History reviewed. No pertinent family history.   Social History     Socioeconomic History    Marital status:      Spouse name: Not on file    Number of children: Not on file    Years of education: Not on file    Highest education level: Not on file   Occupational History    Not on file   Social Needs    Financial resource strain: Not on file    Food insecurity     Worry: Not on file     Inability: Not on file    Transportation needs     Medical: Not on file     Non-medical: Not on file   Tobacco Use    Smoking status: Current Every Day Smoker     Packs/day: 0.25     Years: 15.00     Pack years: 3.75     Types: Cigarettes    Smokeless tobacco: Never Used   Substance and Sexual Activity    Alcohol use: Not Currently     Frequency: Monthly or less    Drug use: No     Comment: History of drug use     Sexual activity: Not Currently     Partners: Male   Lifestyle    Physical activity     Days per week: Not on file     Minutes per session: Not on file    Stress: Not on file   Relationships    Social connections     Talks on phone: Not on file     Gets together: Not on file     Attends [employer] service: Not on file     Active member of club or organization: Not on file     Attends meetings of clubs or organizations: Not on file     Relationship status: Not on file    Intimate partner violence     Fear of current or ex partner: Not on file     Emotionally abused: Not on file     Physically abused: Not on file     Forced sexual activity: Not on file   Other Topics Concern    Not on file   Social History Narrative    Not on file           ROS:  [x] All negative/unchanged except if checked.  Explain positive(checked items) below:  [] Constitutional  [] Eyes  [] Ear/Nose/Mouth/Throat  [] Respiratory  [] CV  [] GI  []   [] Musculoskeletal  [] Skin/Breast  [] Neurological  [] Endocrine  [] Heme/Lymph  [] Allergic/Immunologic    Explanation:     MEDICATIONS:    Current Facility-Administered Medications:     acetaminophen (TYLENOL) tablet 650 mg, 650 mg, Oral, Q6H PRN, Frantz Sifuentes Gluckner, APRN - CNS, 650 mg at 05/25/20 0933    amLODIPine (NORVASC) tablet 10 mg, 10 mg, Oral, Daily, Mark Curet, APRN - CNS, Stopped at 05/25/20 2709    atenolol (TENORMIN) tablet 25 mg, 25 mg, Oral, Daily, Mark Curet, APRN - CNS, Stopped at 05/25/20 0909    cetirizine (ZYRTEC) tablet 10 mg, 10 mg, Oral, Daily, Kay Amen, APRN - CNS, 10 mg at 05/25/20 1228    conjugated estrogens (PREMARIN) vaginal cream 0.5 g, 0.5 g, Vaginal, Q72H, Jami Machado, APRN - CNS    divalproex (DEPAKOTE ER) extended release tablet 500 mg, 500 mg, Oral, Nightly, Kay Amen, APRN - CNS, 500 mg at 05/24/20 2103    famotidine (PEPCID) tablet 20 mg, 20 mg, Oral, BID, Kay Amen, APRN - CNS, 20 mg at 05/25/20 9977    ferrous sulfate (IRON 325) tablet 325 mg, 325 mg, Oral, Daily with breakfast, Kay Amen, APRN - CNS, 325 mg at 93/79/65 0226    folic acid (FOLVITE) tablet 1 mg, 1 mg, Oral, Daily, Kay Amen, APRN - CNS, 1 mg at 05/25/20 3962    gabapentin (NEURONTIN) capsule 100 mg, 100 mg, Oral, TID, Kay Amen, APRN - CNS, 100 mg at 05/25/20 1402    lactulose (CHRONULAC) 10 GM/15ML solution 20 g, 20 g, Oral, TID, Kay Amen, APRN - CNS    LORazepam (ATIVAN) tablet 1 mg, 1 mg, Oral, 4x Daily PRN, Kay Amen, APRN - CNS, 1 mg at 05/25/20 0934    magnesium hydroxide (MILK OF MAGNESIA) 400 MG/5ML suspension 30 mL, 30 mL, Oral, Daily PRN, Kay Amen, APRN - CNS    melatonin tablet 5 mg, 5 mg, Oral, Nightly, Jami Gipsoner, APRN - CNS, 5 mg at 05/24/20 2103    mineral oil-hydrophilic petrolatum (HYDROPHOR) ointment, , Topical, BID PRN, Kay Amen, APRN - CNS    mirtazapine (REMERON) tablet 7.5 mg, 7.5 mg, Oral, Nightly, Florimtiazine Isauro Gluckner, APRN - CNS, 7.5 mg at 05/24/20 2102    nicotine (NICODERM CQ) 14 MG/24HR 1 patch, 1 patch, Transdermal, Q24H, Krista Machado, APRN - CNS, 1 patch at 05/24/20 1439    nitrofurantoin (macrocrystal-monohydrate) (MACROBID) capsule 100 mg, 100 mg, Oral, Daily, Kay Lizarraga APRN - CNS, 100 mg at 05/25/20 6270    rivastigmine (EXELON) 9.5 MG/24HR 1 patch, 1 patch, Transdermal, Daily, Kay Lizarraga APRN - CNS, 1 patch at 05/25/20 0918    sucralfate (CARAFATE) tablet 1 g, 1 g, Oral, BID, Kay Lizarraga, APRN - CNS, 1 g at 05/25/20 0916    sertraline (ZOLOFT) tablet 50 mg, 50 mg, Oral, Daily, Deja Carballo APRN - CNP, 50 mg at 05/25/20 0034      Examination:  /80   Pulse 74   Temp 96.9 °F (36.1 °C) (Temporal)   Resp 16   Ht 5' 8\" (1.727 m)   Wt 146 lb (66.2 kg)   SpO2 96%   BMI 22.20 kg/m²   Gait - steady  Medication side effects(SE):     Mental Status Examination:    Level of consciousness:  within normal limits   Appearance: Disheveled  Behavior/Motor: Hyperactive  Attitude toward examiner: Cooperative  Speech: Spontaneous and loud  Mood: \" I am upset. \"  Affect: Labile and irritable  Thought processes: Linear without flight of ideas or loose associations   thought content: Staff reports death wishes  Cognition:  oriented to person, place, and time   Concentration poor  Insight poor   Judgement poor     ASSESSMENT:  Patient symptoms are:  [] Well controlled  [x] Improving  [] Worsening  [] No change      Diagnosis:   Principal Problem:    Adjustment reaction with mixed disturbance of emotions and conduct  Active Problems:    Dementia with behavioral disturbance (HCC)  Resolved Problems:    * No resolved hospital problems. *      LABS:    No results for input(s): WBC, HGB, PLT in the last 72 hours. No results for input(s): NA, K, CL, CO2, BUN, CREATININE, GLUCOSE in the last 72 hours. No results for input(s): BILITOT, ALKPHOS, AST, ALT in the last 72 hours. Lab Results   Component Value Date    LABAMPH NOT DETECTED 05/17/2020    BARBSCNU NOT DETECTED 05/17/2020    LABBENZ NOT DETECTED 05/17/2020    LABMETH NOT DETECTED 05/17/2020    OPIATESCREENURINE NOT DETECTED 05/17/2020    PHENCYCLIDINESCREENURINE NOT DETECTED 05/17/2020    ETOH <10 05/17/2020     Lab Results   Component Value Date    TSH 1.960 08/27/2017     No results found for: LITHIUM  No results found for: VALPROATE, CBMZ        Treatment Plan:  Reviewed current Medications with the patient.    Risks, benefits, side effects, drug-to-drug interactions and alternatives to treatment were discussed. Collateral information:   CD evaluation  Encourage patient to attend group and other milieu activities.   Discharge planning discussed with the patient and treatment team.    Continue Depakote  mg at bedtime for mood  Continue Remeron 7.5 mg at bedtime for depression and sleep  Continue Zoloft 50 mg daily for depressed mood    PSYCHOTHERAPY/COUNSELING:  [x] Therapeutic interview  [x] Supportive  [] CBT  [] Ongoing  [] Other    [x] Patient continues to need, on a daily basis, active treatment furnished directly by or requiring the supervision of inpatient psychiatric personnel      Anticipated Length of stay: 5 to 7 days based on stability            Electronically signed by RON Dennis CNP on 5/25/2020 at 2:37 PM

## 2020-05-25 NOTE — PLAN OF CARE
Denies SI, HI, and hallucinations but continues to voice that she doesn't feel like there is any point in living. Tearful at times and states \"I don't want to be here. \" Patient was out on the unit for breakfast and showered. Patient had an outburst in her room and was yelling, \"Someone stole my toothbrush\" and \"My foot hurts because I can't stop peeing. \" Patient given PRN tylenol and ativan. See MAR. All other medications taken without issue. No other behavioral issues. Will continue to monitor and offer support.         Problem: Altered Mood, Depressive Behavior:  Goal: Ability to disclose and discuss suicidal ideas will improve  Description: Ability to disclose and discuss suicidal ideas will improve  5/24/2020 2224 by Ge Michael RN  Outcome: Met This Shift  Goal: Absence of self-harm  Description: Absence of self-harm  Outcome: Met This Shift     Problem: Risk of Harm:  Goal: Ability to remain free from injury will improve  Description: Ability to remain free from injury will improve  5/25/2020 1126 by Tiffany Hong RN  Outcome: Met This Shift    Problem: Falls - Risk of:  Goal: Will remain free from falls  Description: Will remain free from falls  5/25/2020 1126 by Tiffany Hong RN  Outcome: Met This Shift     Problem: Pain:  Goal: Control of chronic pain  Description: Control of chronic pain  Outcome: Met This Shift     Problem: Altered Mood, Depressive Behavior:  Goal: Able to verbalize acceptance of life and situations over which he or she has no control  Description: Able to verbalize acceptance of life and situations over which he or she has no control  5/25/2020 1126 by Tiffany Hong RN  Outcome: Not Met This Shift    Goal: Able to verbalize and/or display a decrease in depressive symptoms  Description: Able to verbalize and/or display a decrease in depressive symptoms  5/25/2020 1126 by Tiffany Hong RN  Outcome: Not Met This Shift         Electronically signed by Tiffany Hong RN on 5/25/2020 at 11:35 AM

## 2020-05-26 ENCOUNTER — HOSPITAL ENCOUNTER (OUTPATIENT)
Age: 50
Setting detail: OBSERVATION
Discharge: NURSING, REHAB OR LONG-TERM CARE | DRG: 817 | End: 2020-05-27
Attending: INTERNAL MEDICINE | Admitting: INTERNAL MEDICINE
Payer: MEDICAID

## 2020-05-26 VITALS
WEIGHT: 146 LBS | TEMPERATURE: 97.2 F | HEIGHT: 68 IN | DIASTOLIC BLOOD PRESSURE: 78 MMHG | RESPIRATION RATE: 16 BRPM | BODY MASS INDEX: 22.13 KG/M2 | SYSTOLIC BLOOD PRESSURE: 107 MMHG | OXYGEN SATURATION: 96 % | HEART RATE: 83 BPM

## 2020-05-26 PROBLEM — U07.1 COVID-19 VIRUS INFECTION: Status: RESOLVED | Noted: 2020-05-18 | Resolved: 2020-05-26

## 2020-05-26 PROBLEM — U07.1 COVID-19 VIRUS INFECTION: Status: ACTIVE | Noted: 2020-05-26

## 2020-05-26 PROBLEM — U07.1 COVID-19 VIRUS INFECTION: Status: RESOLVED | Noted: 2020-05-26 | Resolved: 2020-05-26

## 2020-05-26 PROBLEM — F43.25 ADJUSTMENT REACTION WITH MIXED DISTURBANCE OF EMOTIONS AND CONDUCT: Status: RESOLVED | Noted: 2020-05-19 | Resolved: 2020-05-26

## 2020-05-26 PROBLEM — T14.91XA SUICIDE ATTEMPT (HCC): Status: RESOLVED | Noted: 2020-05-19 | Resolved: 2020-05-26

## 2020-05-26 LAB
BILIRUBIN URINE: NEGATIVE
BLOOD, URINE: NEGATIVE
CLARITY: CLEAR
COLOR: YELLOW
GLUCOSE URINE: NEGATIVE MG/DL
KETONES, URINE: NEGATIVE MG/DL
LEUKOCYTE ESTERASE, URINE: NEGATIVE
NITRITE, URINE: NEGATIVE
PH UA: 8.5 (ref 5–9)
PROTEIN UA: NEGATIVE MG/DL
SARS-COV-2, NAAT: DETECTED
SPECIFIC GRAVITY UA: 1.01 (ref 1–1.03)
UROBILINOGEN, URINE: 0.2 E.U./DL
VALPROIC ACID LEVEL: 50 MCG/ML (ref 50–100)

## 2020-05-26 PROCEDURE — 81003 URINALYSIS AUTO W/O SCOPE: CPT

## 2020-05-26 PROCEDURE — G0379 DIRECT REFER HOSPITAL OBSERV: HCPCS

## 2020-05-26 PROCEDURE — 99239 HOSP IP/OBS DSCHRG MGMT >30: CPT | Performed by: NURSE PRACTITIONER

## 2020-05-26 PROCEDURE — U0002 COVID-19 LAB TEST NON-CDC: HCPCS

## 2020-05-26 PROCEDURE — 6370000000 HC RX 637 (ALT 250 FOR IP): Performed by: CLINICAL NURSE SPECIALIST

## 2020-05-26 PROCEDURE — 36415 COLL VENOUS BLD VENIPUNCTURE: CPT

## 2020-05-26 PROCEDURE — G0378 HOSPITAL OBSERVATION PER HR: HCPCS

## 2020-05-26 PROCEDURE — 80164 ASSAY DIPROPYLACETIC ACD TOT: CPT

## 2020-05-26 PROCEDURE — 6370000000 HC RX 637 (ALT 250 FOR IP): Performed by: NURSE PRACTITIONER

## 2020-05-26 RX ORDER — LACTULOSE 10 G/15ML
20 SOLUTION ORAL 3 TIMES DAILY
Status: CANCELLED | OUTPATIENT
Start: 2020-05-26

## 2020-05-26 RX ORDER — NICOTINE 21 MG/24HR
1 PATCH, TRANSDERMAL 24 HOURS TRANSDERMAL EVERY 24 HOURS
Status: CANCELLED | OUTPATIENT
Start: 2020-05-27

## 2020-05-26 RX ORDER — FAMOTIDINE 20 MG/1
20 TABLET, FILM COATED ORAL 2 TIMES DAILY
Status: DISCONTINUED | OUTPATIENT
Start: 2020-05-26 | End: 2020-05-27 | Stop reason: HOSPADM

## 2020-05-26 RX ORDER — CHOLECALCIFEROL (VITAMIN D3) 125 MCG
5 CAPSULE ORAL NIGHTLY
Status: CANCELLED | OUTPATIENT
Start: 2020-05-26

## 2020-05-26 RX ORDER — CETIRIZINE HYDROCHLORIDE 10 MG/1
10 TABLET ORAL DAILY
Status: CANCELLED | OUTPATIENT
Start: 2020-05-27

## 2020-05-26 RX ORDER — MIRTAZAPINE 15 MG/1
7.5 TABLET, FILM COATED ORAL NIGHTLY
Status: DISCONTINUED | OUTPATIENT
Start: 2020-05-26 | End: 2020-05-27 | Stop reason: HOSPADM

## 2020-05-26 RX ORDER — ATENOLOL 25 MG/1
25 TABLET ORAL DAILY
Status: CANCELLED | OUTPATIENT
Start: 2020-05-27

## 2020-05-26 RX ORDER — FAMOTIDINE 20 MG/1
20 TABLET, FILM COATED ORAL 2 TIMES DAILY
Status: CANCELLED | OUTPATIENT
Start: 2020-05-26

## 2020-05-26 RX ORDER — GABAPENTIN 100 MG/1
100 CAPSULE ORAL 3 TIMES DAILY
Status: CANCELLED | OUTPATIENT
Start: 2020-05-26

## 2020-05-26 RX ORDER — MIRTAZAPINE 15 MG/1
7.5 TABLET, FILM COATED ORAL NIGHTLY
Status: CANCELLED | OUTPATIENT
Start: 2020-05-26

## 2020-05-26 RX ORDER — GABAPENTIN 100 MG/1
100 CAPSULE ORAL 3 TIMES DAILY
Status: DISCONTINUED | OUTPATIENT
Start: 2020-05-26 | End: 2020-05-27 | Stop reason: HOSPADM

## 2020-05-26 RX ORDER — RIVASTIGMINE 9.5 MG/24H
1 PATCH, EXTENDED RELEASE TRANSDERMAL DAILY
Status: CANCELLED | OUTPATIENT
Start: 2020-05-27

## 2020-05-26 RX ORDER — FERROUS SULFATE 325(65) MG
325 TABLET ORAL
Status: CANCELLED | OUTPATIENT
Start: 2020-05-27

## 2020-05-26 RX ORDER — DIVALPROEX SODIUM 500 MG/1
500 TABLET, EXTENDED RELEASE ORAL NIGHTLY
Qty: 30 TABLET | Refills: 0 | Status: SHIPPED | OUTPATIENT
Start: 2020-05-26 | End: 2020-06-25

## 2020-05-26 RX ORDER — NICOTINE 21 MG/24HR
1 PATCH, TRANSDERMAL 24 HOURS TRANSDERMAL EVERY 24 HOURS
Status: DISCONTINUED | OUTPATIENT
Start: 2020-05-27 | End: 2020-05-27 | Stop reason: HOSPADM

## 2020-05-26 RX ORDER — LORAZEPAM 1 MG/1
1 TABLET ORAL 4 TIMES DAILY PRN
Status: CANCELLED | OUTPATIENT
Start: 2020-05-26

## 2020-05-26 RX ORDER — SUCRALFATE 1 G/1
1 TABLET ORAL 2 TIMES DAILY
Status: DISCONTINUED | OUTPATIENT
Start: 2020-05-26 | End: 2020-05-27 | Stop reason: HOSPADM

## 2020-05-26 RX ORDER — GABAPENTIN 100 MG/1
100 CAPSULE ORAL 3 TIMES DAILY
Qty: 45 CAPSULE | Refills: 0 | Status: SHIPPED | OUTPATIENT
Start: 2020-05-26 | End: 2020-06-10

## 2020-05-26 RX ORDER — SUCRALFATE 1 G/1
1 TABLET ORAL 2 TIMES DAILY
Status: CANCELLED | OUTPATIENT
Start: 2020-05-26

## 2020-05-26 RX ORDER — LORAZEPAM 1 MG/1
1 TABLET ORAL 4 TIMES DAILY PRN
Status: DISCONTINUED | OUTPATIENT
Start: 2020-05-26 | End: 2020-05-27 | Stop reason: HOSPADM

## 2020-05-26 RX ORDER — NITROFURANTOIN 25; 75 MG/1; MG/1
100 CAPSULE ORAL DAILY
Status: CANCELLED | OUTPATIENT
Start: 2020-05-27 | End: 2020-05-28

## 2020-05-26 RX ORDER — DIVALPROEX SODIUM 500 MG/1
500 TABLET, EXTENDED RELEASE ORAL NIGHTLY
Status: DISCONTINUED | OUTPATIENT
Start: 2020-05-26 | End: 2020-05-27 | Stop reason: HOSPADM

## 2020-05-26 RX ORDER — CHOLECALCIFEROL (VITAMIN D3) 125 MCG
5 CAPSULE ORAL NIGHTLY
Status: DISCONTINUED | OUTPATIENT
Start: 2020-05-26 | End: 2020-05-27 | Stop reason: HOSPADM

## 2020-05-26 RX ORDER — AMLODIPINE BESYLATE 10 MG/1
10 TABLET ORAL DAILY
Status: CANCELLED | OUTPATIENT
Start: 2020-05-27

## 2020-05-26 RX ORDER — LACTULOSE 10 G/15ML
20 SOLUTION ORAL 3 TIMES DAILY
Status: DISCONTINUED | OUTPATIENT
Start: 2020-05-26 | End: 2020-05-27 | Stop reason: HOSPADM

## 2020-05-26 RX ORDER — PETROLATUM 42 G/100G
OINTMENT TOPICAL 2 TIMES DAILY PRN
Status: CANCELLED | OUTPATIENT
Start: 2020-05-26

## 2020-05-26 RX ORDER — PETROLATUM 42 G/100G
OINTMENT TOPICAL 2 TIMES DAILY PRN
Status: DISCONTINUED | OUTPATIENT
Start: 2020-05-26 | End: 2020-05-27 | Stop reason: HOSPADM

## 2020-05-26 RX ORDER — FERROUS SULFATE 325(65) MG
325 TABLET ORAL
Status: DISCONTINUED | OUTPATIENT
Start: 2020-05-27 | End: 2020-05-27 | Stop reason: HOSPADM

## 2020-05-26 RX ORDER — CETIRIZINE HYDROCHLORIDE 10 MG/1
10 TABLET ORAL DAILY
Status: DISCONTINUED | OUTPATIENT
Start: 2020-05-27 | End: 2020-05-27 | Stop reason: HOSPADM

## 2020-05-26 RX ORDER — ACETAMINOPHEN 325 MG/1
650 TABLET ORAL EVERY 6 HOURS PRN
Status: CANCELLED | OUTPATIENT
Start: 2020-05-26

## 2020-05-26 RX ORDER — ATENOLOL 25 MG/1
25 TABLET ORAL DAILY
Status: DISCONTINUED | OUTPATIENT
Start: 2020-05-27 | End: 2020-05-27 | Stop reason: HOSPADM

## 2020-05-26 RX ORDER — NITROFURANTOIN 25; 75 MG/1; MG/1
100 CAPSULE ORAL DAILY
Status: COMPLETED | OUTPATIENT
Start: 2020-05-27 | End: 2020-05-27

## 2020-05-26 RX ORDER — ACETAMINOPHEN 325 MG/1
650 TABLET ORAL EVERY 6 HOURS PRN
Status: DISCONTINUED | OUTPATIENT
Start: 2020-05-26 | End: 2020-05-27 | Stop reason: HOSPADM

## 2020-05-26 RX ORDER — FOLIC ACID 1 MG/1
1 TABLET ORAL DAILY
Status: CANCELLED | OUTPATIENT
Start: 2020-05-27

## 2020-05-26 RX ORDER — MIRTAZAPINE 7.5 MG/1
7.5 TABLET, FILM COATED ORAL NIGHTLY
Qty: 30 TABLET | Refills: 0 | Status: SHIPPED | OUTPATIENT
Start: 2020-05-26 | End: 2022-02-06

## 2020-05-26 RX ORDER — AMLODIPINE BESYLATE 10 MG/1
10 TABLET ORAL DAILY
Status: DISCONTINUED | OUTPATIENT
Start: 2020-05-27 | End: 2020-05-27 | Stop reason: HOSPADM

## 2020-05-26 RX ORDER — DIVALPROEX SODIUM 500 MG/1
500 TABLET, EXTENDED RELEASE ORAL NIGHTLY
Status: CANCELLED | OUTPATIENT
Start: 2020-05-26

## 2020-05-26 RX ORDER — FOLIC ACID 1 MG/1
1 TABLET ORAL DAILY
Status: DISCONTINUED | OUTPATIENT
Start: 2020-05-27 | End: 2020-05-27 | Stop reason: HOSPADM

## 2020-05-26 RX ORDER — RIVASTIGMINE 9.5 MG/24H
1 PATCH, EXTENDED RELEASE TRANSDERMAL DAILY
Status: DISCONTINUED | OUTPATIENT
Start: 2020-05-27 | End: 2020-05-27 | Stop reason: HOSPADM

## 2020-05-26 RX ADMIN — GABAPENTIN 100 MG: 100 CAPSULE ORAL at 20:57

## 2020-05-26 RX ADMIN — SERTRALINE 50 MG: 50 TABLET, FILM COATED ORAL at 11:11

## 2020-05-26 RX ADMIN — FERROUS SULFATE TAB 325 MG (65 MG ELEMENTAL FE) 325 MG: 325 (65 FE) TAB at 11:10

## 2020-05-26 RX ADMIN — FAMOTIDINE 20 MG: 20 TABLET, FILM COATED ORAL at 11:11

## 2020-05-26 RX ADMIN — ACETAMINOPHEN 650 MG: 325 TABLET, FILM COATED ORAL at 20:57

## 2020-05-26 RX ADMIN — LACTULOSE 20 G: 20 SOLUTION ORAL at 20:56

## 2020-05-26 RX ADMIN — SUCRALFATE 1 G: 1 TABLET ORAL at 20:57

## 2020-05-26 RX ADMIN — ACETAMINOPHEN 650 MG: 325 TABLET ORAL at 11:10

## 2020-05-26 RX ADMIN — LORAZEPAM 1 MG: 1 TABLET ORAL at 11:10

## 2020-05-26 RX ADMIN — CETIRIZINE HYDROCHLORIDE 10 MG: 10 TABLET, FILM COATED ORAL at 11:11

## 2020-05-26 RX ADMIN — DIVALPROEX SODIUM 500 MG: 500 TABLET, EXTENDED RELEASE ORAL at 20:57

## 2020-05-26 RX ADMIN — GABAPENTIN 100 MG: 100 CAPSULE ORAL at 11:10

## 2020-05-26 RX ADMIN — Medication 5 MG: at 20:57

## 2020-05-26 RX ADMIN — MIRTAZAPINE 7.5 MG: 15 TABLET, FILM COATED ORAL at 20:57

## 2020-05-26 RX ADMIN — GABAPENTIN 100 MG: 100 CAPSULE ORAL at 16:16

## 2020-05-26 RX ADMIN — FAMOTIDINE 20 MG: 20 TABLET, FILM COATED ORAL at 20:57

## 2020-05-26 RX ADMIN — FOLIC ACID 1 MG: 1 TABLET ORAL at 11:10

## 2020-05-26 RX ADMIN — LORAZEPAM 1 MG: 1 TABLET ORAL at 23:46

## 2020-05-26 RX ADMIN — SUCRALFATE 1 G: 1 TABLET ORAL at 11:10

## 2020-05-26 RX ADMIN — NITROFURANTOIN MONOHYDRATE/MACROCRYSTALLINE 100 MG: 25; 75 CAPSULE ORAL at 11:09

## 2020-05-26 ASSESSMENT — PAIN DESCRIPTION - ORIENTATION
ORIENTATION: LEFT
ORIENTATION: LEFT;RIGHT

## 2020-05-26 ASSESSMENT — PAIN DESCRIPTION - FREQUENCY: FREQUENCY: CONTINUOUS

## 2020-05-26 ASSESSMENT — PAIN SCALES - GENERAL
PAINLEVEL_OUTOF10: 0
PAINLEVEL_OUTOF10: 0
PAINLEVEL_OUTOF10: 10
PAINLEVEL_OUTOF10: 10
PAINLEVEL_OUTOF10: 0

## 2020-05-26 ASSESSMENT — PAIN DESCRIPTION - LOCATION
LOCATION: FOOT
LOCATION: FOOT

## 2020-05-26 ASSESSMENT — PAIN DESCRIPTION - DESCRIPTORS
DESCRIPTORS: ACHING
DESCRIPTORS: CONSTANT

## 2020-05-26 ASSESSMENT — PAIN - FUNCTIONAL ASSESSMENT: PAIN_FUNCTIONAL_ASSESSMENT: PREVENTS OR INTERFERES SOME ACTIVE ACTIVITIES AND ADLS

## 2020-05-26 ASSESSMENT — PAIN DESCRIPTION - PAIN TYPE
TYPE: CHRONIC PAIN
TYPE: CHRONIC PAIN

## 2020-05-26 NOTE — H&P
Hospital Medicine History & Physical      PCP: Miracle Mims MD    Date of Admission: 5/26/2020    Date of Service: Pt seen/examined on 5/26/20 and Placed in Observation. Chief Complaint:  Positive COVID test      History Of Present Illness:    44 y.o. female who was admitted to the inpatient behavioral unit on 5/22/20 after a suicide attempt and was being cleared for discharge but tested positive for COVID-19 on screening test today. Patient has a recent history of covid-19 as she tested positive on 5/18/20. She was admitted at that time and then cleared with two negative tests before being admitted to inpatient psyche unit on 5/21 and 5/22/20. Patient is to discharge to [facility] in am. Per nursing called and discussed results of testing with [facility] and they are willing to take the patient at 9 am tomorrow. Patient denies any fevers, chills, cough, chest pains, shortness of breath, nausea, vomiting or diarrhea. She does admit to burning on urination as well as frequency. She was cleared by psyche for discharge so she will not need a 1:1 sitter. Past Medical History:          Diagnosis Date    Acute kidney failure with tubular necrosis (HCC)     Anoxic brain damage (HCC)     Bipolar 1 disorder (HCC)     Bipolar disorder (HCC)     [facility]     Cerebral artery occlusion with cerebral infarction [facility]     Cocaine abuse ([facility]     Dementia ([facility]     Depression     GERD (gastroesophageal reflux disease)     Hyperlipidemia     Hyperlipidemia     Hypertension     Iron deficiency anemia     Pseudobulbar affect     Schizoaffective disorder (HCC)     Suicidal ideation     Tachycardia     Tachycardia        Past Surgical History:          Procedure Laterality Date    FOOT SURGERY      LEEP         Medications Prior to Admission:      Prior to Admission medications    Medication Sig Start Date End Date Taking?  Authorizing Provider   divalproex (DEPAKOTE ER) 500 MG extended release tablet Take 1 tablet by mouth nightly 5/26/20 6/25/20  RON Jackson CNP   mirtazapine (REMERON) 7.5 MG tablet Take 1 tablet by mouth nightly 5/26/20 6/25/20  RON Jackson CNP   sertraline (ZOLOFT) 50 MG tablet Take 1 tablet by mouth daily 5/27/20 6/26/20  RON Jackson CNP   gabapentin (NEURONTIN) 100 MG capsule Take 1 capsule by mouth 3 times daily for 15 days. 5/26/20 6/10/20  Albino Patterson MD   LORazepam (ATIVAN) 1 MG tablet Take 1 mg by mouth 4 times daily as needed for Anxiety.     Historical Provider, MD   bisacodyl (DULCOLAX) 5 MG EC tablet Take 10 mg by mouth daily as needed for Constipation    Historical Provider, MD   bisacodyl (DULCOLAX) 10 MG suppository Place 10 mg rectally daily as needed for Constipation    Historical Provider, MD   Sodium Phosphates (FLEET) 7-19 GM/118ML Place 1 enema rectally once as needed (constipation)    Historical Provider, MD   lactulose (CHRONULAC) 10 GM/15ML solution Take 20 g by mouth 3 times daily    Historical Provider, MD   nitrofurantoin, macrocrystal-monohydrate, (MACROBID) 100 MG capsule Take 100 mg by mouth daily 2/27/20 5/27/20  Historical Provider, MD   magnesium hydroxide (MILK OF MAGNESIA) 400 MG/5ML suspension Take 30 mLs by mouth daily as needed for Constipation    Historical Provider, MD   melatonin 5 MG TABS tablet Take 5 mg by mouth nightly as needed (sleep)    Historical Provider, MD   nicotine (NICODERM CQ) 14 MG/24HR Place 1 patch onto the skin every 24 hours    Historical Provider, MD   conjugated estrogens (PREMARIN) 0.625 MG/GM vaginal cream Place 0.5 g vaginally every 72 hours    Historical Provider, MD   acetaminophen (TYLENOL) 325 MG tablet Take 650 mg by mouth every 4 hours as needed for Pain or Fever     Historical Provider, MD   atenolol (TENORMIN) 25 MG tablet Take 25 mg by mouth daily    Historical Provider, MD   sucralfate (CARAFATE) 1 GM tablet Take 1 g by mouth 2 times daily     Historical Provider, MD normal.  No rashes or lesions. Neurologic:  Neurovascularly intact without any focal sensory/motor deficits. Cranial nerves: II-XII intact, grossly non-focal.  Psychiatric:  Alert and oriented, thought content appropriate, normal insight    Labs:     No results for input(s): WBC, HGB, HCT, PLT in the last 72 hours. No results for input(s): NA, K, CL, CO2, BUN, CREATININE, CALCIUM, PHOS in the last 72 hours. Invalid input(s): MAGNES  No results for input(s): AST, ALT, BILIDIR, BILITOT, ALKPHOS in the last 72 hours. No results for input(s): INR in the last 72 hours. No results for input(s): Burnadette Lundborg in the last 72 hours. Urinalysis:      Lab Results   Component Value Date    NITRU Negative 05/22/2020    WBCUA 1-3 05/22/2020    WBCUA 11-15 08/26/2017    BACTERIA RARE 05/22/2020    RBCUA 1-3 05/22/2020    BLOODU Negative 05/22/2020    SPECGRAV 1.020 05/22/2020    GLUCOSEU Negative 05/22/2020         ASSESSMENT:  1. COVID-19 positive  2. Dysuria and frequency  3. Major Depression  4. Hypertension    There are no active hospital problems to display for this patient. PLAN:  Place in observation. Droplet plus isolation. No treatment indicated at this time as completely asymptomatic. Obtain a UA with current symptoms. Ok to discharge to [facility] in the am.       DVT Prophylaxis: SCDs. Diet: DIET GENERAL; Safety Tray; Safety Tray (Disposables)  Code Status: Full Code    PT/OT Eval Status: N/A    Dispo - Obs        Nori Albert DO    Thank you Kristyn Valadez MD for the opportunity to be involved in this patient's care. If you have any questions or concerns please feel free to contact me at [phone].

## 2020-05-26 NOTE — DISCHARGE INSTR - COC
Continuity of Care Form    Patient Name: Sai Benedict   :  1970  MRN:  [id number]    Admit date:  2020  Discharge date:  2020    Code Status Order: Full Code   Advance Directives:   [facility] Documentation     Date/Time Healthcare Directive Type of Healthcare Directive Copy in [address] Agent's Name Healthcare Agent's Phone Number    20 1636  No, patient does not have an advance directive for healthcare treatment -- -- -- -- --          Admitting Physician:  Jo Ann Hansen MD  PCP: Rhiannon Hall MD    Discharging Nurse: [facility] Unit/Room#: 8827/9803-Y  Discharging Unit Phone Number: [phone]    Emergency Contact:   Extended Emergency Contact Information  Primary Emergency Contact: [address] [place] [address] Phone: [phone]  Relation: Spouse  Secondary Emergency Contact: Linn evans Viola Cherry Phone: [phone]  Relation: Child  Preferred language: English   needed? No    Past Surgical History:  Past Surgical History:   Procedure Laterality Date    FOOT SURGERY      LEEP         Immunization History: There is no immunization history on file for this patient.     Active Problems:  Patient Active Problem List   Diagnosis Code    COVID-19 virus infection U07.1    Suicide attempt ([address] T14.91XA    Adjustment reaction with mixed disturbance of emotions and conduct F43.25    Dementia with behavioral disturbance ([facility] F03.91       Isolation/Infection:   Isolation          No Isolation        Patient Infection Status     Infection Onset Added Last Indicated Last Indicated By Review Planned Expiration Resolved Resolved By    COVID-19 20 COVID-19  20      Resolved    COVID-19 Rule Out 20 COVID-19 (Ordered)   20 Rule-Out Test Resulted          Nurse Assessment:  Last Vital Signs: /78   Pulse 83   Temp 97.2 °F (36.2 °C) (Temporal)   Resp 16   Ht 5' 8\" (1.727 m)   Wt 146 lb (66.2 kg)   SpO2 96%   BMI 22.20 kg/m²     Last documented pain score (0-10 scale): Pain Level: 10  Last Weight:   Wt Readings from Last 1 Encounters:   05/22/20 146 lb (66.2 kg)     Mental Status:  oriented and alert    IV Access:  - None    Nursing Mobility/ADLs:  Walking   Independent  Transfer  Independent  Bathing  Independent  Dressing  Independent  Toileting  Independent  Feeding  Independent  Med Admin  Assisted  Med Delivery   whole    Wound Care Documentation and Therapy:        Elimination:  Continence:   · Bowel: Yes  · Bladder: Yes  Urinary Catheter: None   Colostomy/Ileostomy/Ileal Conduit: No       Date of Last BM: 05/26/2020    Intake/Output Summary (Last 24 hours) at 5/26/2020 1136  Last data filed at 5/26/2020 0938  Gross per 24 hour   Intake 480 ml   Output --   Net 480 ml     No intake/output data recorded. Safety Concerns:     None    Impairments/Disabilities:      None    Nutrition Therapy:  Current Nutrition Therapy:   - Oral Diet:  General    Routes of Feeding: Oral  Liquids: No Restrictions  Daily Fluid Restriction: no  Last Modified Barium Swallow with Video (Video Swallowing Test): not done    Treatments at the Time of Hospital Discharge:   Respiratory Treatments: ***  Oxygen Therapy:  is not on home oxygen therapy.   Ventilator:    - No ventilator support    Rehab Therapies: {THERAPEUTIC INTERVENTION:[id number]}  Weight Bearing Status/Restrictions: No weight bearing restirctions  Other Medical Equipment (for information only, NOT a DME order):  independent  Other Treatments: ***    Patient's personal belongings (please select all that are sent with patient):  Yamile ALBA SIGNATURE:  Jm Irene  05/26/20  1230    CASE MANAGEMENT/SOCIAL WORK SECTION    Inpatient Status Date: ***    Readmission Risk Assessment Score:  Readmission Risk              Risk of Unplanned Readmission:        15           Discharging to Facility/ Agency   · Name: [facility]  · Address: [address]  · Phone: [phone] · [phone]    Dialysis Facility (if applicable)   · Name:  · Address:  · Dialysis Schedule:  · Phone:  · Fax:    / signature: Electronically signed by Johnny Horn on 5/26/2020 at 1:36 PM    PHYSICIAN SECTION    Prognosis: Good    Condition at Discharge: Stable    Rehab Potential (if transferring to Rehab): Good    Recommended Labs or Other Treatments After Discharge: Depakote level 5 days    Physician Certification: I certify the above information and transfer of Sofya Meek  is necessary for the continuing treatment of the diagnosis listed and that she requires Ted Downinguel for greater 30 days.      Update Admission H&P: No change in H&P    PHYSICIAN SIGNATURE:  Electronically signed by RON Rico CNP on 5/26/20 at 11:36 AM EDT

## 2020-05-26 NOTE — CARE COORDINATION
Left message for Ailin Carrillo (Melvin Che[facility] Coordinator) [phone]    Left message for Yue (adm) notifying  them of pt discharged back to the facility for today  [phone]

## 2020-05-26 NOTE — GROUP NOTE
Group Therapy Note    Date: 5/25/2020    Group Start Time: 1930  Group End Time: 2030  Group Topic: Relaxation    [facility] [address]        Group Therapy Note    Attendees: 8/12         Signature:  Gaaml Hills

## 2020-05-26 NOTE — DISCHARGE SUMMARY
DISCHARGE SUMMARY      Patient ID:  Taras Contreras  [id number]  44 y.o.  1970    Admit date: 5/22/2020    Discharge date and time: 5/26/2020    Admitting Physician: Ivania Flores MD     Discharge Physician: Dr Noelle Ojeda MD    Admission Diagnoses: Adjustment reaction with mixed disturbance of emotions and conduct [F43.25]    Admission Condition: poor    Discharged Condition: stable    Admission Circumstance: Suicide attempt while at nursing home. PAST MEDICAL/PSYCHIATRIC HISTORY:   Past Medical History:   Diagnosis Date    Acute kidney failure with tubular necrosis (HCC)     Anoxic brain damage (HCC)     Bipolar 1 disorder (HCC)     Bipolar disorder (HCC)     [facility]     Cerebral artery occlusion with cerebral infarction ([facility]     Cocaine abuse ([facility]     Dementia (HCC)     Depression     GERD (gastroesophageal reflux disease)     Hyperlipidemia     Hyperlipidemia     Hypertension     Iron deficiency anemia     Pseudobulbar affect     Schizoaffective disorder (HCC)     Suicidal ideation     Tachycardia     Tachycardia        FAMILY/SOCIAL HISTORY:  History reviewed. No pertinent family history.   Social History     Socioeconomic History    Marital status:      Spouse name: Not on file    Number of children: Not on file    Years of education: Not on file    Highest education level: Not on file   Occupational History    Not on file   Social Needs    Financial resource strain: Not on file    Food insecurity     Worry: Not on file     Inability: Not on file    Transportation needs     Medical: Not on file     Non-medical: Not on file   Tobacco Use    Smoking status: Current Every Day Smoker     Packs/day: 0.25     Years: 15.00     Pack years: 3.75     Types: Cigarettes    Smokeless tobacco: Never Used   Substance and Sexual Activity    Alcohol use: Not Currently     Frequency: Monthly or less    Drug use: No     Comment: History of drug use     Sexual activity: Not Currently     Partners: Male   Lifestyle    Physical activity     Days per week: Not on file     Minutes per session: Not on file    Stress: Not on file   Relationships    Social connections     Talks on phone: Not on file     Gets together: Not on file     Attends [employer] service: Not on file     Active member of club or organization: Not on file     Attends meetings of clubs or organizations: Not on file     Relationship status: Not on file    Intimate partner violence     Fear of current or ex partner: Not on file     Emotionally abused: Not on file     Physically abused: Not on file     Forced sexual activity: Not on file   Other Topics Concern    Not on file   Social History Narrative    Not on file       MEDICATIONS:    Current Facility-Administered Medications:     acetaminophen (TYLENOL) tablet 650 mg, 650 mg, Oral, Q6H PRN, RON Perez - CNS, 650 mg at 05/26/20 1110    amLODIPine (NORVASC) tablet 10 mg, 10 mg, Oral, Daily, Angeles Le APRN - CNS, Stopped at 05/25/20 0909    atenolol (TENORMIN) tablet 25 mg, 25 mg, Oral, Daily, Angeles Le APRN - CNS, Stopped at 05/25/20 0909    cetirizine (ZYRTEC) tablet 10 mg, 10 mg, Oral, Daily, Angeles Le APRN - CNS, 10 mg at 05/26/20 1111    conjugated estrogens (PREMARIN) vaginal cream 0.5 g, 0.5 g, Vaginal, Q72H, Armaan Machado, APRN - CNS    divalproex (DEPAKOTE ER) extended release tablet 500 mg, 500 mg, Oral, Nightly, Angeles Le APRN - CNS, 500 mg at 05/25/20 2042    famotidine (PEPCID) tablet 20 mg, 20 mg, Oral, BID, Armaan Machado, APRN - CNS, 20 mg at 05/26/20 1111    ferrous sulfate (IRON 325) tablet 325 mg, 325 mg, Oral, Daily with breakfast, Angeles Le APRN - CNS, 325 mg at 66/74/44 9384    folic acid (FOLVITE) tablet 1 mg, 1 mg, Oral, Daily, Armaan Machado, APRN - CNS, 1 mg at 05/26/20 1110    gabapentin (NEURONTIN) capsule 100 mg, 100 mg, Oral, TID, Angeles Le, APRN - CNS, 100 mg at 05/26/20 1110    lactulose (CHRONULAC) 10 GM/15ML solution 20 g, 20 g, Oral, TID, Navneet Reyes APRN - CNS    LORazepam (ATIVAN) tablet 1 mg, 1 mg, Oral, 4x Daily PRN, Navneet Bautistaed, APRN - CNS, 1 mg at 05/26/20 1110    magnesium hydroxide (MILK OF MAGNESIA) 400 MG/5ML suspension 30 mL, 30 mL, Oral, Daily PRN, Navneet Reyes, APRN - CNS    melatonin tablet 5 mg, 5 mg, Oral, Nightly, Navneet Bautistaed, APRN - CNS, 5 mg at 05/25/20 2042    mineral oil-hydrophilic petrolatum (HYDROPHOR) ointment, , Topical, BID PRN, Navneet Reyes, APRN - CNS    mirtazapine (REMERON) tablet 7.5 mg, 7.5 mg, Oral, Nightly, Navneet Reyes, APRN - CNS, 7.5 mg at 05/25/20 2042    nicotine (NICODERM CQ) 14 MG/24HR 1 patch, 1 patch, Transdermal, Q24H, Navneet Reyes APRN - CNS, 1 patch at 05/25/20 1509    nitrofurantoin (macrocrystal-monohydrate) (MACROBID) capsule 100 mg, 100 mg, Oral, Daily, Navneet Reyes, APRN - CNS, 100 mg at 05/26/20 1109    rivastigmine (EXELON) 9.5 MG/24HR 1 patch, 1 patch, Transdermal, Daily, Navneet Reyes APRN - CNS, 1 patch at 05/26/20 1111    sucralfate (CARAFATE) tablet 1 g, 1 g, Oral, BID, Sandip Machado, APRN - CNS, 1 g at 05/26/20 1110    sertraline (ZOLOFT) tablet 50 mg, 50 mg, Oral, Daily, Sudhir Ibrahim, APRN - CNP, 50 mg at 05/26/20 1111    Examination:  /78   Pulse 83   Temp 97.2 °F (36.2 °C) (Temporal)   Resp 16   Ht 5' 8\" (1.727 m)   Wt 146 lb (66.2 kg)   SpO2 96%   BMI 22.20 kg/m²   Gait - steady    HOSPITAL COURSE[de-identified]  Following admission to the hospital, patient had a complete physical exam and blood work up. The patient is a 57-year-old female with significant past history of CVA with anoxic brain injury, dementia, bipolar disorder who presented to the emergency department after trying to strangle herself with a cord due to her extreme distress from her 59-year-old daughter recently dying.   The patient currently resides at [address] [facility] due to the inability for her to care for herself. The patient was seen by the psychiatry team while she was on the medical floor due to the patient testing COVID-19 positive. While the patient was on the medical floor psychiatry saw her on a daily basis for assessment and treatment. The patient also had medication adjustments and titrations while she was on the medical unit. Once the patient was COVID-19 negative she was transferred to the psychiatric department for further psychiatric assessment, treatment, stabilization. On the patient's initial days on the psychiatric unit the patient was on the unit being allowed and labile in her emotions. She was complaining of bizarre things correlating her foot pain with her frequent urination. The patient reported a passive death wish and was irritable at times. As the medication started to take effect and the patient started to socialize with peers her mood symptoms significantly decreased. The patient became significantly less labile in her emotional state and in fact became bright and pleasant. The patient was compliant with her medication therapy and going to groups on a regular basis. The patient was active participant in her mental health stabilization and health care. Patient was without emotional outburst or disturbance while in the unit and was in control of her behaviors. The patient showed no para-suicidal actions during her admission. The patient is however very childlike in nature due to her multiple brain injuries but, the patient vehemently denies suicidal homicidal ideation or intent to harm. The patient has not displayed any self-injurious behavior. The patient does not appear to be overtly or covertly psychotic. The patient does not exhibit any neurovegetative signs of depression. The patient vehemently denies any audible or visual hallucinations, depressive symptoms, anxiety, and paranoia.   The patient does not appear to be going through any acute psychiatric issue or process at this time nor does the patient endorse any. The patient was highly grateful for the help that she received on the unit from the medical staff. The patient stated that the medical staff was a great help to her and that we \" gave her a new lease on life and a chance of making her a better person for her family \". The patient stated that the medical staff helped her so much to better quality of her life and to help her achieve mental stabilization. The patient stated that she learned a significant amount of information from attending groups. The patient stated that she learned a lot of helpful coping skills from attending group. The patient stated that she will continue to use her coping skills to  help her handle any stressful situation that she comes across in his life. The patient was extremely grateful and stated that she learned so much and that she received the help she needed here. The patient has no more suicidal, homicidal, psychotic, or manic symptomology. The patient received the required treatment with medication, participated in group milieu, remained engaged in unit activities, and learned appropriate coping skills. The patient was seen watching TV, playing games, socializing with peers, and calling friends and family. There were no mention or gestures of self-harm or harm to others. The patient's mental status returned to baseline. Treatment team felt that the patient has obtained maximal benefit out of this hospitalization does not meet the criteria for inpatient hospitalization anymore. However the patient will continue to benefit from outpatient and follow-up treatment to maintain stability. Collateral information was obtained and reconciled, there were no concerns about the patient's safety. The patient has no access to guns or weapons at this time. The patient was discharged [address] [employer] home.     Patient was HGB, PLT in the last 72 hours. No results for input(s): NA, K, CL, CO2, BUN, CREATININE, GLUCOSE in the last 72 hours. No results for input(s): BILITOT, ALKPHOS, AST, ALT in the last 72 hours. Lab Results   Component Value Date    LABAMPH NOT DETECTED 05/17/2020    BARBSCNU NOT DETECTED 05/17/2020    LABBENZ NOT DETECTED 05/17/2020    LABMETH NOT DETECTED 05/17/2020    OPIATESCREENURINE NOT DETECTED 05/17/2020    PHENCYCLIDINESCREENURINE NOT DETECTED 05/17/2020    ETOH <10 05/17/2020     Lab Results   Component Value Date    TSH 1.960 08/27/2017     No results found for: LITHIUM  Lab Results   Component Value Date    VALPROATE 50 05/26/2020       RISK ASSESSMENT AT DISCHARGE: Low risk for suicide and homicide. Treatment Plan:  Reviewed current Medications with the patient. Education provided on the complaince with treatment. Risks, benefits, side effects, drug-to-drug interactions and alternatives to treatment were discussed. Encourage patient to attend outpatient follow up appointment and therapy. Patient was advised to call the outpatient provider, visit the nearest ED or call 911 if symptoms are not manageable. Patient's family member was contacted prior to the discharge. Medication List      START taking these medications    divalproex 500 MG extended release tablet  Commonly known as:  DEPAKOTE ER  Take 1 tablet by mouth nightly  Replaces:  divalproex 500 MG DR tablet     gabapentin 100 MG capsule  Commonly known as:  NEURONTIN  Take 1 capsule by mouth 3 times daily for 15 days.      sertraline 50 MG tablet  Commonly known as:  ZOLOFT  Take 1 tablet by mouth daily  Start taking on:  May 27, 2020        CHANGE how you take these medications    mirtazapine 7.5 MG tablet  Commonly known as:  REMERON  Take 1 tablet by mouth nightly  What changed:    · medication strength  · how much to take        CONTINUE taking these medications    acetaminophen 325 MG tablet  Commonly known as: TYLENOL     amLODIPine 10 MG tablet  Commonly known as:  NORVASC     atenolol 25 MG tablet  Commonly known as:  TENORMIN     * bisacodyl 5 MG EC tablet  Commonly known as:  DULCOLAX     * bisacodyl 10 MG suppository  Commonly known as:  DULCOLAX     cetirizine 10 MG tablet  Commonly known as:  ZYRTEC     conjugated estrogens 0.625 MG/GM vaginal cream  Commonly known as:  PREMARIN     famotidine 20 MG tablet  Commonly known as:  PEPCID     ferrous sulfate 325 (65 Fe) MG tablet  Commonly known as:  IRON 325     fleet 2-92 UU/357ST     folic acid 1 MG tablet  Commonly known as:  FOLVITE     lactulose 10 GM/15ML solution  Commonly known as:  CHRONULAC     LORazepam 1 MG tablet  Commonly known as:  ATIVAN     magnesium hydroxide 400 MG/5ML suspension  Commonly known as:  MILK OF MAGNESIA     melatonin 5 MG Tabs tablet     Myrbetriq 50 MG Tb24  Generic drug:  mirabegron     nicotine 14 MG/24HR  Commonly known as:  NICODERM CQ     nitrofurantoin (macrocrystal-monohydrate) 100 MG capsule  Commonly known as:  MACROBID     rivastigmine 9.5 MG/24HR  Commonly known as:  EXELON     sucralfate 1 GM tablet  Commonly known as:  CARAFATE         * This list has 2 medication(s) that are the same as other medications prescribed for you. Read the directions carefully, and ask your doctor or other care provider to review them with you.             STOP taking these medications    divalproex 500 MG DR tablet  Commonly known as:  DEPAKOTE  Replaced by:  divalproex 500 MG extended release tablet     Invega 9 MG extended release tablet  Generic drug:  paliperidone     QUEtiapine 100 MG tablet  Commonly known as:  SEROQUEL     uribel 118 MG Caps           Where to Get Your Medications      These medications were sent to [facility] [address] [address] [address]    Phone:  [phone]   · divalproex 500 MG extended release tablet  · gabapentin 100 MG capsule  · mirtazapine 7.5 MG tablet  · sertraline 50 MG tablet           TIME SPEND - 35 MINUTES TO COMPLETE THE EVALUATION, DISCHARGE SUMMARY, MEDICATION RECONCILIATION AND FOLLOW UP CARE     Signed:  Nicole Ronquilol  5/26/2020  3:04 PM

## 2020-05-26 NOTE — CARE COORDINATION
spoke with Corinda Claude informed pt being discharged back to [address] [place] is requesting nurse to nurse called to    [phone]  [address]     made arrangements for Physician ambulance to transfer pt to   [address] [place] [address] Road  4:30 pm

## 2020-05-26 NOTE — PROGRESS NOTES
Group Therapy Note    Patient attended goals group and stated daily goal as to try to be positive if I could get in the shower. Group Therapy Note    Date: 5/26/2020  Start Time: 10:00  End Time:  10:30  Number of Participants: 6    Type of Group: Psychoeducation    Wellness Binder Information  Module Name: Coping Skills  Session Number: NA    Patient's Goal: To id positive coping skills to use on a daily basis to improve wellness. Notes: Attended group and was able to participate in discussion as an active listener but was demanding at times stating that she wanted in the shower. Status After Intervention:  Unchanged    Participation Level:  Active Listener    Participation Quality: Inappropriate and Intrusive      Speech:  pressured      Thought Process/Content: Flight of ideas      Affective Functioning: Incongruent      Mood: angry, anxious, depressed and irritable      Level of consciousness:  Preoccupied      Response to Learning: Resistant      Endings: None Reported    Modes of Intervention: Education      Discipline Responsible: Psychoeducational Specialist      Signature:  EMERALD Perez

## 2020-05-26 NOTE — CARE COORDINATION
In order to ensure appropriate transition and discharge planning is in place, the following documents have been transmitted to  [facility] as the new outpatient provider:    Tiarra Flores The d/c diagnosis was transmitted to the next care provider   The reason for hospitalization was transmitted to the next care provider   The d/c medications (dosage and indication) were transmitted to the next care provider    The continuing care plan was transmitted to the next care provider

## 2020-05-26 NOTE — PLAN OF CARE
Problem: Altered Mood, Depressive Behavior:  Goal: Able to verbalize acceptance of life and situations over which he or she has no control  Description: Able to verbalize acceptance of life and situations over which he or she has no control  Outcome: Met This Shift     Problem: Altered Mood, Depressive Behavior:  Goal: Absence of self-harm  Description: Absence of self-harm  5/26/2020 1203 by Marietta Peterson RN  Outcome: Met This Shift     Pt denies SI/HI and AV hallucinations. Pt yelling out in room that she has to pee all the time and can't stop. Pt states she does not want to be here. Pt out on the unit for meals. Pt taking prescribed medications. Pt eating provided meals. Will continue to provide comfort and support for patient.

## 2020-05-27 VITALS
OXYGEN SATURATION: 93 % | DIASTOLIC BLOOD PRESSURE: 58 MMHG | RESPIRATION RATE: 16 BRPM | TEMPERATURE: 97.1 F | HEART RATE: 57 BPM | BODY MASS INDEX: 22.12 KG/M2 | WEIGHT: 145.94 LBS | SYSTOLIC BLOOD PRESSURE: 94 MMHG | HEIGHT: 68 IN

## 2020-05-27 PROCEDURE — 6370000000 HC RX 637 (ALT 250 FOR IP): Performed by: NURSE PRACTITIONER

## 2020-05-27 PROCEDURE — G0378 HOSPITAL OBSERVATION PER HR: HCPCS

## 2020-05-27 RX ADMIN — SUCRALFATE 1 G: 1 TABLET ORAL at 08:16

## 2020-05-27 RX ADMIN — FERROUS SULFATE TAB 325 MG (65 MG ELEMENTAL FE) 325 MG: 325 (65 FE) TAB at 08:12

## 2020-05-27 RX ADMIN — GABAPENTIN 100 MG: 100 CAPSULE ORAL at 08:12

## 2020-05-27 RX ADMIN — NITROFURANTOIN MONOHYDRATE/MACROCRYSTALLINE 100 MG: 25; 75 CAPSULE ORAL at 08:11

## 2020-05-27 RX ADMIN — SERTRALINE HYDROCHLORIDE 50 MG: 50 TABLET, FILM COATED ORAL at 08:11

## 2020-05-27 RX ADMIN — CETIRIZINE HYDROCHLORIDE 10 MG: 10 TABLET, FILM COATED ORAL at 08:11

## 2020-05-27 RX ADMIN — FOLIC ACID 1 MG: 1 TABLET ORAL at 08:12

## 2020-05-27 RX ADMIN — FAMOTIDINE 20 MG: 20 TABLET, FILM COATED ORAL at 08:11

## 2020-05-27 ASSESSMENT — PAIN SCALES - GENERAL: PAINLEVEL_OUTOF10: 0

## 2020-05-27 NOTE — DISCHARGE INSTR - COC
Continuity of Care Form    Patient Name: Adriana Ferro   :  1970  MRN:  [id number]    Admit date:  2020  Discharge date:  2020    Code Status Order: Full Code   Advance Directives:     Admitting Physician:  Mendel Mire, DO  PCP: Eusebia Lowe MD    Discharging Nurse: [facility] Unit/Room#: 6406/6406-A  Discharging Unit Phone Number: [phone]    Emergency Contact:   Extended Emergency Contact Information  Primary Emergency Contact: [address] [place] [address] Phone: [phone]  Relation: Spouse  Secondary Emergency Contact: Kvng Deal  [place] Phone: [phone]  Relation: Child  Preferred language: English   needed? No    Past Surgical History:  Past Surgical History:   Procedure Laterality Date    FOOT SURGERY      LEEP         Immunization History: There is no immunization history on file for this patient.     Active Problems:  Patient Active Problem List   Diagnosis Code    Dementia with behavioral disturbance (HCC) F03.91       Isolation/Infection:   Isolation          Droplet Plus        Patient Infection Status     Infection Onset Added Last Indicated Last Indicated By Review Planned Expiration Resolved Resolved By    COVID-19 20 COVID-19  20      Resolved    COVID-19 Rule Out 20 COVID-19 (Ordered)   20 Rule-Out Test Resulted          Nurse Assessment:  Last Vital Signs: /60   Pulse 62   Temp 97.4 °F (36.3 °C) (Oral)   Resp 16     Last documented pain score (0-10 scale): Pain Level: 0  Last Weight:   Wt Readings from Last 1 Encounters:   20 151 lb (68.5 kg)     Mental Status:  oriented and alert    IV Access:  - None    Nursing Mobility/ADLs:  Walking   Assisted  Transfer  Independent  Bathing  [address] [facility] Delivery   whole    Wound Care Documentation and Therapy:none and that she requires {Admit to Appropriate Level of Care:66227} for {GREATER/LESS:775746245} 30 days.      Update Admission H&P: {CHP DME Changes in EBDXK:[id number]}    PHYSICIAN SIGNATURE:  Electronically signed by Cathie Woo DO on 5/26/20 at 10:59 PM EDT

## 2020-05-27 NOTE — PROGRESS NOTES
Pt alert and oriented (x) 4 and informed that she will be returning to [facility], pt has all of her belongings, verified with security no belongings being held for patient, pt tolerating breakfast and meds, pt remains on room air and afebrile, no clinical s/s of any distress

## 2020-05-27 NOTE — DISCHARGE SUMMARY
Discharge Summary    Date:5/27/2020        Patient Suyapa Marrero     YOB: 1970     Age:49 y.o. Admit Date:5/26/2020   Admission Condition:fair   Discharged Condition:stable  Discharge Date: 05/27/20     Discharge Diagnoses   Active Problems:    * No active hospital problems. *  Resolved Problems:    COVID-19 virus infection      Hospital Stay   Narrative of Hospital Course:   59-year-old female who initially presented with suicide attempt at [facility]. She was tested positive for COVID-19 in the ER on 5/18/2020. Therefore she could not be admitted to inpatient psychiatry. She was thus admitted to [facility]. She was stable and asymptomatic. She did not require any medications or antivirals. Psychiatry was consulted and saw patient. She was retested for COVID-19 on 5/21 and 5/22 which was negative. She was then transferred to inpatient psychiatry. She had a Depakote, Remeron and Zoloft adjusted. She had improvement of her symptoms. She was scheduled for discharged yesterday to [facility] but she tested positive for COVID-19. She was thus readmitted for isolation to inpatient hospice service. She was accepted to [facility] with COVID-19 diagnosis. She will be discharged back to home facility. Consultants:   IP CONSULT TO HOSPITALIST    Time Spent on Discharge:   25minutes were spent in patient examination, evaluation, counseling as well as medication reconciliation, prescriptions for required medications, discharge plan and follow up.         Significant Diagnostic Studies:   Recent Labs:  CBC:   Lab Results   Component Value Date    WBC 7.4 05/21/2020    RBC 4.14 05/21/2020    HGB 13.9 05/21/2020    HCT 41.6 05/21/2020    .5 05/21/2020    MCH 33.6 05/21/2020    MCHC 33.4 05/21/2020    RDW 11.9 05/21/2020     05/21/2020     BMP:    Lab Results   Component Value Date    GLUCOSE 93 05/22/2020    GLUCOSE 96 08/29/2017     05/22/2020    K 4.0 05/22/2020     05/22/2020    CO2 21 05/22/2020    ANIONGAP 14 05/22/2020    BUN 22 05/22/2020    CREATININE 0.8 05/22/2020    CALCIUM 8.5 05/22/2020    LABGLOM >60 05/22/2020    LABGLOM >60 08/29/2017    GFRAA >60 05/22/2020     HFP:    Lab Results   Component Value Date    PROT 6.2 05/22/2020     PT/INR:    Lab Results   Component Value Date    PROTIME 10.8 05/22/2020    INR 1.0 05/22/2020     FLP:    Lab Results   Component Value Date    CHOL 171 08/27/2017    TRIG 68 08/27/2017    HDL 86 08/27/2017     TSH:    Lab Results   Component Value Date    TSH 1.960 08/27/2017       Radiology Last 7 Days:  No results found.     Discharge Plan   Disposition: [facility]    Provider Follow-Up:   Marisela Abdalla MD  [address] [place], [place] [address]in [address]  [phone]    In 1 week      Prabha Sharpe,  [facility] [id number] [phone]    Schedule an appointment as soon as possible for a visit         Hospital/Incidental Findings Requiring Follow-Up:  As per hospital course    Patient Instructions   Diet: regular diet    Activity: activity as tolerated      Discharge Medications         Medication List      CONTINUE taking these medications    acetaminophen 325 MG tablet  Commonly known as:  TYLENOL     amLODIPine 10 MG tablet  Commonly known as:  NORVASC     atenolol 25 MG tablet  Commonly known as:  TENORMIN     * bisacodyl 5 MG EC tablet  Commonly known as:  DULCOLAX     * bisacodyl 10 MG suppository  Commonly known as:  DULCOLAX     cetirizine 10 MG tablet  Commonly known as:  ZYRTEC     conjugated estrogens 0.625 MG/GM vaginal cream  Commonly known as:  PREMARIN     divalproex 500 MG extended release tablet  Commonly known as:  DEPAKOTE ER  Take 1 tablet by mouth nightly     famotidine 20 MG tablet  Commonly known as:  PEPCID     ferrous sulfate 325 (65 Fe) MG tablet  Commonly known as:  IRON 325     fleet 7-79 DA/466VV     folic acid 1 MG tablet  Commonly known as:  [employer]

## 2020-05-27 NOTE — CARE COORDINATION
Care Coordination:  I was called at home last night by charge nurse stating that pt was accepted for admission to medical unit  by sound physician from [facility]. We attempted to delay admission and check if behavioral health could arrange for transport to [facility] rather than readmit to hospital, but [facility] continued to transfer pt to [facility]. Apparently pt was initially set up for discharge by behavioral health to return to [facility]. They proceeded to test pt for covid, although this was not necessary because [facility] will take back their patients that are covid, then held discharge when test returned positive. She was set up to return to [facility] as this was clarified by Physician ambulance for 430 pm time. I spoke to   of [facility]. [facility] was not notified that the admission was held and was not called to inquire regarding return to [facility] with covid. Subsequently, Behavioral health obtained acceptance from [facility] physician for Medical readmission to the hospital and sent pt over for admission to [facility]  before we could clarify all the above. 15 Cecilia Carey, spoke to  and he agreed there was no reason why the pt should not have been sent from behavioral health to [facility] as arranged at 430 pm.  Because of the late hour of transfer to medical side as it was now close to 7 pm, he asked for staffing purposes if we could hold pt overnight now and send first thing in am.  I have set pt up with physician ambulance for 9 am ride with Abril Cheli. She already had pt in system from 430 cancellation and had all demographics . Pt will transport via stretcher as per protocol for covid positive.   I have notified charge nurse and she is aware      Lara Mock

## 2020-06-03 ENCOUNTER — APPOINTMENT (OUTPATIENT)
Dept: GENERAL RADIOLOGY | Age: 50
DRG: 201 | End: 2020-06-03
Payer: MEDICAID

## 2020-06-03 ENCOUNTER — APPOINTMENT (OUTPATIENT)
Dept: CT IMAGING | Age: 50
DRG: 201 | End: 2020-06-03
Payer: MEDICAID

## 2020-06-03 ENCOUNTER — HOSPITAL ENCOUNTER (INPATIENT)
Age: 50
LOS: 12 days | Discharge: NURSING, REHAB OR LONG-TERM CARE | DRG: 201 | End: 2020-06-15
Attending: EMERGENCY MEDICINE | Admitting: INTERNAL MEDICINE
Payer: MEDICAID

## 2020-06-03 PROBLEM — T14.90XA TRAUMA: Status: ACTIVE | Noted: 2020-06-03

## 2020-06-03 LAB
% INHIBITION AA: 79.2 %
% INHIBITION ADP: 0 %
ABO/RH: NORMAL
ACETAMINOPHEN LEVEL: <5 MCG/ML (ref 10–30)
ALBUMIN SERPL-MCNC: 4.1 G/DL (ref 3.5–5.2)
ALP BLD-CCNC: 44 U/L (ref 35–104)
ALT SERPL-CCNC: 20 U/L (ref 0–32)
ANGLE (CLOT STRENGTH): 66.7 DEGREE (ref 59–74)
ANION GAP SERPL CALCULATED.3IONS-SCNC: 13 MMOL/L (ref 7–16)
ANTIBODY SCREEN: NORMAL
APTT: 26.2 SEC (ref 24.5–35.1)
AST SERPL-CCNC: 28 U/L (ref 0–31)
B.E.: -2.6 MMOL/L (ref -3–3)
BILIRUB SERPL-MCNC: 0.2 MG/DL (ref 0–1.2)
BUN BLDV-MCNC: 21 MG/DL (ref 6–20)
CALCIUM SERPL-MCNC: 8.7 MG/DL (ref 8.6–10.2)
CHLORIDE BLD-SCNC: 101 MMOL/L (ref 98–107)
CO2: 23 MMOL/L (ref 22–29)
COHB: 0 % (ref 0–1.5)
CREAT SERPL-MCNC: 1 MG/DL (ref 0.5–1)
CRITICAL: ABNORMAL
DATE ANALYZED: ABNORMAL
DATE OF COLLECTION: ABNORMAL
EPL-TEG: 1.4 % (ref 0–15)
ETHANOL: <10 MG/DL (ref 0–0.08)
G-TEG: 6.9 K D/SC (ref 4.5–11)
GFR AFRICAN AMERICAN: >60
GFR AFRICAN AMERICAN: >60
GFR NON-AFRICAN AMERICAN: 52 ML/MIN/1.73
GFR NON-AFRICAN AMERICAN: 58 ML/MIN/1.73
GLUCOSE BLD-MCNC: 137 MG/DL (ref 74–99)
GLUCOSE BLD-MCNC: 139 MG/DL (ref 74–99)
HCO3: 21.5 MMOL/L (ref 22–26)
HCT VFR BLD CALC: 38.2 % (ref 34–48)
HEMOGLOBIN: 12.7 G/DL (ref 11.5–15.5)
HHB: 2.1 % (ref 0–5)
INR BLD: 1
K (CLOTTING TIME): 1.9 MIN (ref 1–3)
LAB: ABNORMAL
LACTIC ACID: 2.3 MMOL/L (ref 0.5–2.2)
LACTIC ACID: 2.4 MMOL/L (ref 0.5–2.2)
LY30 (FIBRINOLYSIS): 1.4 % (ref 0–8)
Lab: ABNORMAL
MA (MAX AMPLITUDE): 57.9 MM (ref 50–70)
MA-AA: 42.7 MM
MA-ACTIVATED: 38.7 MM
MA-ADP: 61.6 MM
MA-TEG BASELINE: 57.9 MM
MCH RBC QN AUTO: 33.5 PG (ref 26–35)
MCHC RBC AUTO-ENTMCNC: 33.2 % (ref 32–34.5)
MCV RBC AUTO: 100.8 FL (ref 80–99.9)
METHB: 0.3 % (ref 0–1.5)
MODE: ABNORMAL
O2 CONTENT: 18 ML/DL
O2 SATURATION: 97.9 % (ref 92–98.5)
O2HB: 97.6 % (ref 94–97)
OPERATOR ID: ABNORMAL
PATIENT TEMP: 37 C
PCO2: 35.2 MMHG (ref 35–45)
PDW BLD-RTO: 12.6 FL (ref 11.5–15)
PERFORMED ON: ABNORMAL
PH BLOOD GAS: 7.4 (ref 7.35–7.45)
PLATELET # BLD: 213 E9/L (ref 130–450)
PMV BLD AUTO: 11 FL (ref 7–12)
PO2: 103.5 MMHG (ref 75–100)
POC CHLORIDE: 105 MMOL/L (ref 100–108)
POC CREATININE: 1.1 MG/DL (ref 0.5–1)
POC POTASSIUM: 4.2 MMOL/L (ref 3.5–5)
POC SODIUM: 139 MMOL/L (ref 132–146)
POTASSIUM SERPL-SCNC: 4.11 MMOL/L (ref 3.5–5)
POTASSIUM SERPL-SCNC: 5 MMOL/L (ref 3.5–5)
PROTHROMBIN TIME: 11.2 SEC (ref 9.3–12.4)
R (REACTION TIME): 4.3 MIN (ref 5–10)
RBC # BLD: 3.79 E12/L (ref 3.5–5.5)
SALICYLATE, SERUM: <0.3 MG/DL (ref 0–30)
SODIUM BLD-SCNC: 137 MMOL/L (ref 132–146)
SOURCE, BLOOD GAS: ABNORMAL
THB: 13 G/DL (ref 11.5–16.5)
TIME ANALYZED: 1247
TOTAL PROTEIN: 6.7 G/DL (ref 6.4–8.3)
TRICYCLIC ANTIDEPRESSANTS SCREEN SERUM: NEGATIVE NG/ML
TROPONIN: <0.01 NG/ML (ref 0–0.03)
WBC # BLD: 8.5 E9/L (ref 4.5–11.5)

## 2020-06-03 PROCEDURE — 6360000002 HC RX W HCPCS: Performed by: EMERGENCY MEDICINE

## 2020-06-03 PROCEDURE — 80307 DRUG TEST PRSMV CHEM ANLYZR: CPT

## 2020-06-03 PROCEDURE — 80053 COMPREHEN METABOLIC PANEL: CPT

## 2020-06-03 PROCEDURE — 84132 ASSAY OF SERUM POTASSIUM: CPT

## 2020-06-03 PROCEDURE — 99254 IP/OBS CNSLTJ NEW/EST MOD 60: CPT | Performed by: INTERNAL MEDICINE

## 2020-06-03 PROCEDURE — 82805 BLOOD GASES W/O2 SATURATION: CPT

## 2020-06-03 PROCEDURE — 82947 ASSAY GLUCOSE BLOOD QUANT: CPT

## 2020-06-03 PROCEDURE — 6370000000 HC RX 637 (ALT 250 FOR IP): Performed by: STUDENT IN AN ORGANIZED HEALTH CARE EDUCATION/TRAINING PROGRAM

## 2020-06-03 PROCEDURE — 85610 PROTHROMBIN TIME: CPT

## 2020-06-03 PROCEDURE — 83605 ASSAY OF LACTIC ACID: CPT

## 2020-06-03 PROCEDURE — 85730 THROMBOPLASTIN TIME PARTIAL: CPT

## 2020-06-03 PROCEDURE — 2000000000 HC ICU R&B

## 2020-06-03 PROCEDURE — 6810039001 HC L1 TRAUMA PRIORITY

## 2020-06-03 PROCEDURE — 72131 CT LUMBAR SPINE W/O DYE: CPT

## 2020-06-03 PROCEDURE — 96374 THER/PROPH/DIAG INJ IV PUSH: CPT

## 2020-06-03 PROCEDURE — 85576 BLOOD PLATELET AGGREGATION: CPT

## 2020-06-03 PROCEDURE — G0480 DRUG TEST DEF 1-7 CLASSES: HCPCS

## 2020-06-03 PROCEDURE — 99284 EMERGENCY DEPT VISIT MOD MDM: CPT

## 2020-06-03 PROCEDURE — 84295 ASSAY OF SERUM SODIUM: CPT

## 2020-06-03 PROCEDURE — 99222 1ST HOSP IP/OBS MODERATE 55: CPT | Performed by: SURGERY

## 2020-06-03 PROCEDURE — 6370000000 HC RX 637 (ALT 250 FOR IP): Performed by: INTERNAL MEDICINE

## 2020-06-03 PROCEDURE — 84484 ASSAY OF TROPONIN QUANT: CPT

## 2020-06-03 PROCEDURE — 86900 BLOOD TYPING SEROLOGIC ABO: CPT

## 2020-06-03 PROCEDURE — 02HV33Z INSERTION OF INFUSION DEVICE INTO SUPERIOR VENA CAVA, PERCUTANEOUS APPROACH: ICD-10-PCS | Performed by: INTERNAL MEDICINE

## 2020-06-03 PROCEDURE — 36415 COLL VENOUS BLD VENIPUNCTURE: CPT

## 2020-06-03 PROCEDURE — 70450 CT HEAD/BRAIN W/O DYE: CPT

## 2020-06-03 PROCEDURE — 72170 X-RAY EXAM OF PELVIS: CPT

## 2020-06-03 PROCEDURE — 85384 FIBRINOGEN ACTIVITY: CPT

## 2020-06-03 PROCEDURE — 86901 BLOOD TYPING SEROLOGIC RH(D): CPT

## 2020-06-03 PROCEDURE — 86850 RBC ANTIBODY SCREEN: CPT

## 2020-06-03 PROCEDURE — 71045 X-RAY EXAM CHEST 1 VIEW: CPT

## 2020-06-03 PROCEDURE — 36556 INSERT NON-TUNNEL CV CATH: CPT

## 2020-06-03 PROCEDURE — 82435 ASSAY OF BLOOD CHLORIDE: CPT

## 2020-06-03 PROCEDURE — 2580000003 HC RX 258: Performed by: INTERNAL MEDICINE

## 2020-06-03 PROCEDURE — 72128 CT CHEST SPINE W/O DYE: CPT

## 2020-06-03 PROCEDURE — 72125 CT NECK SPINE W/O DYE: CPT

## 2020-06-03 PROCEDURE — 82565 ASSAY OF CREATININE: CPT

## 2020-06-03 PROCEDURE — 2580000003 HC RX 258: Performed by: STUDENT IN AN ORGANIZED HEALTH CARE EDUCATION/TRAINING PROGRAM

## 2020-06-03 PROCEDURE — 85347 COAGULATION TIME ACTIVATED: CPT

## 2020-06-03 PROCEDURE — 93005 ELECTROCARDIOGRAM TRACING: CPT | Performed by: EMERGENCY MEDICINE

## 2020-06-03 PROCEDURE — 85027 COMPLETE CBC AUTOMATED: CPT

## 2020-06-03 RX ORDER — ONDANSETRON 2 MG/ML
4 INJECTION INTRAMUSCULAR; INTRAVENOUS EVERY 6 HOURS PRN
Status: DISCONTINUED | OUTPATIENT
Start: 2020-06-03 | End: 2020-06-05

## 2020-06-03 RX ORDER — ACETAMINOPHEN 325 MG/1
650 TABLET ORAL EVERY 4 HOURS PRN
Status: DISCONTINUED | OUTPATIENT
Start: 2020-06-03 | End: 2020-06-16 | Stop reason: HOSPADM

## 2020-06-03 RX ORDER — PROMETHAZINE HYDROCHLORIDE 25 MG/1
12.5 TABLET ORAL EVERY 6 HOURS PRN
Status: DISCONTINUED | OUTPATIENT
Start: 2020-06-03 | End: 2020-06-05

## 2020-06-03 RX ORDER — DOPAMINE HYDROCHLORIDE 320 MG/100ML
INJECTION, SOLUTION INTRAVENOUS
Status: DISPENSED
Start: 2020-06-03 | End: 2020-06-04

## 2020-06-03 RX ORDER — MIRTAZAPINE 15 MG/1
7.5 TABLET, FILM COATED ORAL NIGHTLY
Status: DISCONTINUED | OUTPATIENT
Start: 2020-06-03 | End: 2020-06-16 | Stop reason: HOSPADM

## 2020-06-03 RX ORDER — FERROUS SULFATE 325(65) MG
325 TABLET ORAL
Status: DISCONTINUED | OUTPATIENT
Start: 2020-06-04 | End: 2020-06-16 | Stop reason: HOSPADM

## 2020-06-03 RX ORDER — FOLIC ACID 1 MG/1
1 TABLET ORAL DAILY
Status: DISCONTINUED | OUTPATIENT
Start: 2020-06-03 | End: 2020-06-16 | Stop reason: HOSPADM

## 2020-06-03 RX ORDER — GABAPENTIN 100 MG/1
100 CAPSULE ORAL 3 TIMES DAILY
Status: DISCONTINUED | OUTPATIENT
Start: 2020-06-03 | End: 2020-06-15

## 2020-06-03 RX ORDER — LORAZEPAM 1 MG/1
1 TABLET ORAL NIGHTLY PRN
Status: DISCONTINUED | OUTPATIENT
Start: 2020-06-03 | End: 2020-06-16 | Stop reason: HOSPADM

## 2020-06-03 RX ORDER — FERROUS SULFATE 325(65) MG
325 TABLET ORAL 2 TIMES DAILY WITH MEALS
Status: DISCONTINUED | OUTPATIENT
Start: 2020-06-03 | End: 2020-06-03

## 2020-06-03 RX ORDER — DOPAMINE HYDROCHLORIDE 320 MG/100ML
2.5 INJECTION, SOLUTION INTRAVENOUS CONTINUOUS
Status: DISCONTINUED | OUTPATIENT
Start: 2020-06-03 | End: 2020-06-05

## 2020-06-03 RX ORDER — ATROPINE SULFATE 0.1 MG/ML
INJECTION INTRAVENOUS
Status: DISPENSED
Start: 2020-06-03 | End: 2020-06-04

## 2020-06-03 RX ORDER — ATROPINE SULFATE 0.1 MG/ML
INJECTION INTRAVENOUS DAILY PRN
Status: COMPLETED | OUTPATIENT
Start: 2020-06-03 | End: 2020-06-03

## 2020-06-03 RX ORDER — PANTOPRAZOLE SODIUM 40 MG/1
40 TABLET, DELAYED RELEASE ORAL
Status: DISCONTINUED | OUTPATIENT
Start: 2020-06-03 | End: 2020-06-16 | Stop reason: HOSPADM

## 2020-06-03 RX ORDER — SODIUM CHLORIDE 0.9 % (FLUSH) 0.9 %
10 SYRINGE (ML) INJECTION PRN
Status: DISCONTINUED | OUTPATIENT
Start: 2020-06-03 | End: 2020-06-16 | Stop reason: HOSPADM

## 2020-06-03 RX ORDER — SODIUM CHLORIDE 9 MG/ML
INJECTION, SOLUTION INTRAVENOUS CONTINUOUS
Status: DISCONTINUED | OUTPATIENT
Start: 2020-06-03 | End: 2020-06-04

## 2020-06-03 RX ORDER — DIVALPROEX SODIUM 500 MG/1
500 TABLET, EXTENDED RELEASE ORAL DAILY
Status: DISCONTINUED | OUTPATIENT
Start: 2020-06-03 | End: 2020-06-16 | Stop reason: HOSPADM

## 2020-06-03 RX ORDER — SODIUM CHLORIDE 0.9 % (FLUSH) 0.9 %
10 SYRINGE (ML) INJECTION EVERY 12 HOURS SCHEDULED
Status: DISCONTINUED | OUTPATIENT
Start: 2020-06-03 | End: 2020-06-16 | Stop reason: HOSPADM

## 2020-06-03 RX ORDER — POLYETHYLENE GLYCOL 3350 17 G/17G
17 POWDER, FOR SOLUTION ORAL DAILY PRN
Status: DISCONTINUED | OUTPATIENT
Start: 2020-06-03 | End: 2020-06-16 | Stop reason: HOSPADM

## 2020-06-03 RX ADMIN — MIRTAZAPINE 7.5 MG: 15 TABLET, FILM COATED ORAL at 21:07

## 2020-06-03 RX ADMIN — PANTOPRAZOLE SODIUM 40 MG: 40 TABLET, DELAYED RELEASE ORAL at 21:06

## 2020-06-03 RX ADMIN — GABAPENTIN 100 MG: 100 CAPSULE ORAL at 21:06

## 2020-06-03 RX ADMIN — ATROPINE SULFATE 1 MG: 0.1 INJECTION, SOLUTION ENDOTRACHEAL; INTRAMUSCULAR; INTRAVENOUS; SUBCUTANEOUS at 12:54

## 2020-06-03 RX ADMIN — SODIUM CHLORIDE 1000 ML: 9 INJECTION, SOLUTION INTRAVENOUS at 21:05

## 2020-06-03 RX ADMIN — DOPAMINE HYDROCHLORIDE 7.5 MCG/KG/MIN: 320 INJECTION, SOLUTION INTRAVENOUS at 13:46

## 2020-06-03 RX ADMIN — ACETAMINOPHEN 650 MG: 325 TABLET, FILM COATED ORAL at 22:25

## 2020-06-03 RX ADMIN — FOLIC ACID 1 MG: 1 TABLET ORAL at 21:07

## 2020-06-03 RX ADMIN — DIVALPROEX SODIUM 500 MG: 500 TABLET, EXTENDED RELEASE ORAL at 21:06

## 2020-06-03 RX ADMIN — SERTRALINE 50 MG: 50 TABLET, FILM COATED ORAL at 21:06

## 2020-06-03 RX ADMIN — Medication 10 ML: at 21:08

## 2020-06-03 RX ADMIN — LORAZEPAM 1 MG: 1 TABLET ORAL at 22:25

## 2020-06-03 RX ADMIN — DOPAMINE HYDROCHLORIDE 5 MCG/KG/MIN: 320 INJECTION, SOLUTION INTRAVENOUS at 13:30

## 2020-06-03 ASSESSMENT — PAIN SCALES - GENERAL: PAINLEVEL_OUTOF10: 10

## 2020-06-03 NOTE — ED PROVIDER NOTES
encounters were reviewed. ------------------------------ ED COURSE/MEDICAL DECISION MAKING----------------------  Medications   DOPamine (INTROPIN) 800 mg in dextrose 5 % 250 mL infusion (10 mcg/kg/min × 76 kg Intravenous Restarted 6/3/20 1701)   atropine injection (1 mg Intravenous Given 6/3/20 1254)         ED COURSE:       Medical Decision Making:    Patient presents to the ED for above signs and symptoms. Based on patient's presentation, there was suspicion for secondary third-degree heart block. EKG showing sinus bradycardia, however EMS EKG showed concerning AV block. EP consulted and advised dopamine, as they will evaluate patient in the ED and recommend further monitoring in the hospital.  Patient was further evaluated by trauma with blood work and imaging per trauma team. Patient requires continued workup and management of their symptoms and will be admitted to the hospital for further evaluation and treatment. Patient agrees with the plan and all questions were answered. This patient's ED course included: a personal history and physicial examination, IV medications, cardiac monitoring, continuous pulse oximetry and complex medical decision making and emergency management    This patient has been closely monitored during their ED course. Re-Evaluations:             Re-evaluation. Patients symptoms are improving    Re-examination  6/3/20   12:57 PM EDT          Vital Signs:   Vitals:    06/03/20 1557 06/03/20 1602 06/03/20 1717 06/03/20 1722   BP: (!) 94/59 93/60 96/63 98/66   Pulse: 53 50 50 52   Resp: 16 16 16 17   SpO2: 95% 97% 96% 96%   Weight:         Card/Pulm:  Rhythm: slow rate. Heart Sounds: no murmurs, gallops, or rubs. clear to auscultation, no wheezes or rales and unlabored breathing. Capillary Refill: normal.  Radial Pulse:  present 2+. Skin:  Dry and Warm.         Consultations:             Trauma Team    Critical Care: Please note that the withdrawal or failure to

## 2020-06-03 NOTE — ED NOTES
Bed: 22  Expected date:   Expected time:   Means of arrival:   Comments:  trauma     Luis Tellez, ANJALI  06/03/20 8619

## 2020-06-03 NOTE — H&P
packs per day. Patient advised to quit smoking  Alcohol use:  history of alcohol abuse stopped 1-2 years ago  Illicit drug use: history of abuse, hasn't in years    Past Surgical History:  Tracheostomy    Anticoagulant use:  No   Antiplatelet use:    No     NSAID use in last 72 hours: unknown  Taken PCN in past:  unknown  Last food/drink: unknown  Last tetanus: unknwon    Family History:   Not pertinent to presenting problem. Complaints:   Head:  None  Neck:   Mild   Chest:   None  Back:   None  Abdomen:   None  Extremities:   None  Comments: I need to urinate    Review of systems:  All negative unless otherwise noted. SECONDARY SURVEY  Head/scalp: Atraumatic    Face: Atraumatic    Eyes/ears/nose: Atraumatic    Pharynx/mouth: Atraumatic    Neck: Atraumatic     Cervical spine tenderness:   Cervical collar in place at time of arrival  Pain:  moderate  ROM:  Not indicated as in C-collar    Chest wall:  Atraumatic    Heart:  Irregular rate & rhythm possible heart block on monitor    Abdomen: Atraumatic. Soft ND  Tenderness:  none    Pelvis: Atraumatic  Tenderness: none    Thoracolumbar spine: Atraumatic  Tenderness:  none    Genitourinary:  Atraumatic. No blood or urine noted    Rectum: Atraumatic. No blood noted. Perineum: Atraumatic. No blood or urine noted. Extremities:   Sensory normal  Motor normal    Distal Pulses  Left arm normal  Right arm normal  Left leg normal  Right leg normal    Capillary refill  Left arm normal  Right arm normal  Left leg normal  Right leg normal    Procedures in ED:  Femoral arterial puncture and Femoral venipuncture    In the event of Emergency Blood Transfusion:  Due to the critical condition of this patient, I request the immediate release of blood products for emergency transfusion secondary to shock. I understand the increased risks incurred by the lack of complete transfusion testing.       Radiology: Chest Xray , Pelvic Xray , CT Head , CT Cervical spine ,

## 2020-06-03 NOTE — CONSULTS
[facility] and [facility] Electrophysiology  Consultation Report  PATIENT: [address] RECORD NUMBER: [id number]  DATE OF SERVICE:  6/3/2020  ATTENDING ELECTROPHYSIOLOGIST: Dr. Kat Meeks  PRIMARY ELECTROPHYSIOLOGIST: Dr. Kat Meeks  REFERRING PHYSICIAN: Breanna Monk MD and Dottie Puckett MD  CHIEF COMPLAINT: Fall, syncope    HPI: This is a 44 y.o. female with a history of   Patient Active Problem List   Diagnosis    Trauma   who presents to the hospital complaining of Fall and syncope. Davi Jane is not known to [employer], she has a past medical history of dementia, COVID-19, suicidal ideation, anoxic brain injury, bipolar 1 disorder, multiple CVAs, cocaine abuse, depression, GERD, hyperlipidemia, hypertension, schizoaffective disorder, iron deficiency anemia and suicidal ideation. She was admitted from 5/18/2020 to 5/27/2020 due to suicidal ideations during that admission she initially tested positive for the novel coronavirus then  On the 21st 22nd she had 2- test was admitted to the psychiatric unit for further management, prior to her discharge back to the nursing facility where she resides on the 26th she again had one positive COVID test.    On 6/3/2020 the patient was at her facility when she experienced a fall, EMS was notified her EKGs in the field showed concern for AV block and the patient was hypotensive thus brought to the emergency department for further management once in the ER she was placed on dopamine with improvement in her bradycardia and heart rate. Review of her EKG strips and EKG did not reveal any high-grade AV blocks at this time. Patient was not examined due to her COVID status to save PPE, physical exam per ER staff. Cardiac electrophysiology service is consulted for Complete heart block.     Patient Active Problem List    Diagnosis Date Noted    Trauma 06/03/2020       No past medical history on file. No family history on file. Social History     Tobacco Use    Smoking status: Not on file   Substance Use Topics    Alcohol use: Not on file       Current Facility-Administered Medications   Medication Dose Route Frequency Provider Last Rate Last Dose    DOPamine (INTROPIN) 800 mg in dextrose 5 % 250 mL infusion  5 mcg/kg/min Intravenous Continuous Akanksha Reyes MD 14.3 mL/hr at 06/03/20 1431 10 mcg/kg/min at 06/03/20 1431     No current outpatient medications on file. No Known Allergies    ROS:   Unable to assess    PHYSICAL EXAM:   Vitals:    06/03/20 1440 06/03/20 1442 06/03/20 1445 06/03/20 1512   BP: (!) 89/58 (!) 96/59 (!) 91/59 107/68   Pulse: 55 52 52 51   Resp: 17 15 16 17   SpO2: 94% 94% 95% 97%   Weight:          Unable to assess due to current COVID status. I have personally reviewed the laboratory, cardiac diagnostic and radiographic testing as outlined below:    Data:    Recent Labs     06/03/20  1240   WBC 8.5   HGB 12.7   HCT 38.2        Recent Labs     06/03/20  1240 06/03/20  1246 06/03/20  1247     --   --    K 5.0  --  4.11     --   --    CO2 23  --   --    BUN 21*  --   --    CREATININE 1.0 1.1*  --    CALCIUM 8.7  --   --       No results found for: MG  No results for input(s): TSH in the last 72 hours. Recent Labs     06/03/20  1240   INR 1.0       CXR 6/3/20:   Hazy opacity in the upper right lung, which may represent atelectasis, consolidation or contusion. Radiographic follow-up is recommended. Telemetry 6/3/20: sinus bradycardia    EKG 6/3/2020: sinus bradycardia no CHB seen Please see scan in Cardiology. Echocardiogram 1/4/2018:         Stress Test 1/4/2018: I have independently reviewed all of the ECGs and rhythm strips per above     Assessment/Plan: This is a 44 y.o. female with a history of   Patient Active Problem List   Diagnosis    Trauma    who presents with syncope, fall and suspected CHB.     1. Sinus with the strips reviewed   : Discontinue baseline metoprolol since she has bradycardia and some hypotension   : Monitor HR on tele clifton   :  Wean dopamine   : she is on multiple psychiatric meds that may be contributing to her symptoms     Thank you for this consult

## 2020-06-03 NOTE — CONSULTS
06/03/20 1728 103/65 (!) 48 13 95 % -- --   06/03/20 1722 98/66 52 17 96 % -- --   06/03/20 1717 96/63 50 16 96 % -- --   06/03/20 1712 94/60 (!) 49 19 96 % -- --   06/03/20 1707 95/61 (!) 49 17 96 % -- --   06/03/20 1702 91/69 52 18 94 % -- --   06/03/20 1657 101/63 (!) 49 16 97 % -- --   06/03/20 1652 95/66 (!) 46 16 97 % -- --   06/03/20 1647 95/62 (!) 48 15 97 % -- --   06/03/20 1642 100/63 (!) 47 15 97 % -- --   06/03/20 1637 102/65 52 12 98 % -- --   06/03/20 1632 95/63 50 15 96 % -- --   06/03/20 1627 100/65 50 12 98 % -- --   06/03/20 1622 92/66 62 17 97 % -- --   06/03/20 1617 99/63 53 13 98 % -- --   06/03/20 1613 97/62 (!) 48 14 100 % -- --   06/03/20 1608 108/71 50 14 100 % -- --   06/03/20 1602 93/60 50 16 97 % -- --   06/03/20 1557 (!) 94/59 53 16 95 % -- --   06/03/20 1551 (!) 91/57 51 16 96 % -- --   06/03/20 1546 -- 59 18 96 % -- --   06/03/20 1541 -- 50 17 97 % -- --   06/03/20 1537 (!) 91/56 51 17 97 % -- --   06/03/20 1532 (!) 93/57 51 17 -- -- --   06/03/20 1527 87/70 62 16 98 % -- --   06/03/20 1522 (!) 86/52 50 18 96 % -- --   06/03/20 1517 (!) 98/59 53 15 96 % -- --   06/03/20 1512 107/68 51 17 97 % -- --   06/03/20 1445 (!) 91/59 52 16 95 % -- --   06/03/20 1442 (!) 96/59 52 15 94 % -- --   06/03/20 1440 (!) 89/58 55 17 94 % -- --   06/03/20 1437 (!) 86/57 54 14 95 % -- --   06/03/20 1435 92/60 56 17 96 % -- --   06/03/20 1432 (!) 87/55 (!) 49 14 96 % -- --   06/03/20 1429 85/60 (!) 48 15 97 % -- --   06/03/20 1427 (!) 89/55 52 11 97 % -- --   06/03/20 1424 (!) 88/59 52 16 97 % -- --   06/03/20 1422 (!) 87/57 51 15 98 % -- --   06/03/20 1419 (!) 83/58 (!) 47 14 96 % -- --   06/03/20 1417 (!) 86/58 51 12 95 % -- --   06/03/20 1415 (!) 90/55 53 14 96 % -- --   06/03/20 1413 (!) 86/54 57 14 97 % -- --   06/03/20 1409 (!) 85/53 (!) 49 16 96 % -- --   06/03/20 1408 (!) 81/48 53 16 95 % -- --   06/03/20 1405 (!) 82/56 50 14 95 % -- --   06/03/20 1359 (!) 81/55 50 17 94 % -- --   06/03/20 1345

## 2020-06-04 LAB
ANION GAP SERPL CALCULATED.3IONS-SCNC: 13 MMOL/L (ref 7–16)
BACTERIA: ABNORMAL /HPF
BASOPHILS ABSOLUTE: 0.05 E9/L (ref 0–0.2)
BASOPHILS RELATIVE PERCENT: 0.5 % (ref 0–2)
BILIRUBIN URINE: NEGATIVE
BLOOD, URINE: NEGATIVE
BUN BLDV-MCNC: 25 MG/DL (ref 6–20)
CALCIUM SERPL-MCNC: 8.6 MG/DL (ref 8.6–10.2)
CHLORIDE BLD-SCNC: 103 MMOL/L (ref 98–107)
CLARITY: CLEAR
CO2: 24 MMOL/L (ref 22–29)
COLOR: YELLOW
CREAT SERPL-MCNC: 0.8 MG/DL (ref 0.5–1)
EOSINOPHILS ABSOLUTE: 0.32 E9/L (ref 0.05–0.5)
EOSINOPHILS RELATIVE PERCENT: 3.4 % (ref 0–6)
EPITHELIAL CELLS, UA: ABNORMAL /HPF
GFR AFRICAN AMERICAN: >60
GFR NON-AFRICAN AMERICAN: >60 ML/MIN/1.73
GLUCOSE BLD-MCNC: 117 MG/DL (ref 74–99)
GLUCOSE URINE: NEGATIVE MG/DL
HCT VFR BLD CALC: 36.7 % (ref 34–48)
HEMOGLOBIN: 12.3 G/DL (ref 11.5–15.5)
IMMATURE GRANULOCYTES #: 0.03 E9/L
IMMATURE GRANULOCYTES %: 0.3 % (ref 0–5)
KETONES, URINE: NEGATIVE MG/DL
LACTIC ACID: 0.8 MMOL/L (ref 0.5–2.2)
LEUKOCYTE ESTERASE, URINE: ABNORMAL
LYMPHOCYTES ABSOLUTE: 1.85 E9/L (ref 1.5–4)
LYMPHOCYTES RELATIVE PERCENT: 19.8 % (ref 20–42)
MAGNESIUM: 1.7 MG/DL (ref 1.6–2.6)
MCH RBC QN AUTO: 33.5 PG (ref 26–35)
MCHC RBC AUTO-ENTMCNC: 33.5 % (ref 32–34.5)
MCV RBC AUTO: 100 FL (ref 80–99.9)
MONOCYTES ABSOLUTE: 0.98 E9/L (ref 0.1–0.95)
MONOCYTES RELATIVE PERCENT: 10.5 % (ref 2–12)
NEUTROPHILS ABSOLUTE: 6.11 E9/L (ref 1.8–7.3)
NEUTROPHILS RELATIVE PERCENT: 65.5 % (ref 43–80)
NITRITE, URINE: NEGATIVE
PDW BLD-RTO: 12.2 FL (ref 11.5–15)
PH UA: 6 (ref 5–9)
PLATELET # BLD: 193 E9/L (ref 130–450)
PMV BLD AUTO: 11 FL (ref 7–12)
POTASSIUM REFLEX MAGNESIUM: 4 MMOL/L (ref 3.5–5)
PROTEIN UA: NEGATIVE MG/DL
RBC # BLD: 3.67 E12/L (ref 3.5–5.5)
RBC UA: ABNORMAL /HPF (ref 0–2)
SODIUM BLD-SCNC: 140 MMOL/L (ref 132–146)
SPECIFIC GRAVITY UA: 1.01 (ref 1–1.03)
TSH SERPL DL<=0.05 MIU/L-ACNC: 0.49 UIU/ML (ref 0.27–4.2)
UROBILINOGEN, URINE: 0.2 E.U./DL
WBC # BLD: 9.3 E9/L (ref 4.5–11.5)
WBC UA: ABNORMAL /HPF (ref 0–5)

## 2020-06-04 PROCEDURE — 2000000000 HC ICU R&B

## 2020-06-04 PROCEDURE — 6370000000 HC RX 637 (ALT 250 FOR IP): Performed by: STUDENT IN AN ORGANIZED HEALTH CARE EDUCATION/TRAINING PROGRAM

## 2020-06-04 PROCEDURE — 6360000002 HC RX W HCPCS: Performed by: STUDENT IN AN ORGANIZED HEALTH CARE EDUCATION/TRAINING PROGRAM

## 2020-06-04 PROCEDURE — 80048 BASIC METABOLIC PNL TOTAL CA: CPT

## 2020-06-04 PROCEDURE — 97162 PT EVAL MOD COMPLEX 30 MIN: CPT

## 2020-06-04 PROCEDURE — 87150 DNA/RNA AMPLIFIED PROBE: CPT

## 2020-06-04 PROCEDURE — 2580000003 HC RX 258: Performed by: STUDENT IN AN ORGANIZED HEALTH CARE EDUCATION/TRAINING PROGRAM

## 2020-06-04 PROCEDURE — 36415 COLL VENOUS BLD VENIPUNCTURE: CPT

## 2020-06-04 PROCEDURE — 99221 1ST HOSP IP/OBS SF/LOW 40: CPT | Performed by: SURGERY

## 2020-06-04 PROCEDURE — 99233 SBSQ HOSP IP/OBS HIGH 50: CPT | Performed by: INTERNAL MEDICINE

## 2020-06-04 PROCEDURE — 2580000003 HC RX 258: Performed by: NURSE PRACTITIONER

## 2020-06-04 PROCEDURE — 6360000002 HC RX W HCPCS: Performed by: INTERNAL MEDICINE

## 2020-06-04 PROCEDURE — 97166 OT EVAL MOD COMPLEX 45 MIN: CPT

## 2020-06-04 PROCEDURE — 83735 ASSAY OF MAGNESIUM: CPT

## 2020-06-04 PROCEDURE — 83605 ASSAY OF LACTIC ACID: CPT

## 2020-06-04 PROCEDURE — 87088 URINE BACTERIA CULTURE: CPT

## 2020-06-04 PROCEDURE — 87040 BLOOD CULTURE FOR BACTERIA: CPT

## 2020-06-04 PROCEDURE — 81001 URINALYSIS AUTO W/SCOPE: CPT

## 2020-06-04 PROCEDURE — 87186 SC STD MICRODIL/AGAR DIL: CPT

## 2020-06-04 PROCEDURE — 97530 THERAPEUTIC ACTIVITIES: CPT

## 2020-06-04 PROCEDURE — 84443 ASSAY THYROID STIM HORMONE: CPT

## 2020-06-04 PROCEDURE — 93005 ELECTROCARDIOGRAM TRACING: CPT | Performed by: NURSE PRACTITIONER

## 2020-06-04 PROCEDURE — 6370000000 HC RX 637 (ALT 250 FOR IP): Performed by: INTERNAL MEDICINE

## 2020-06-04 PROCEDURE — 99223 1ST HOSP IP/OBS HIGH 75: CPT | Performed by: INTERNAL MEDICINE

## 2020-06-04 PROCEDURE — 85025 COMPLETE CBC W/AUTO DIFF WBC: CPT

## 2020-06-04 PROCEDURE — 97535 SELF CARE MNGMENT TRAINING: CPT

## 2020-06-04 PROCEDURE — 87077 CULTURE AEROBIC IDENTIFY: CPT

## 2020-06-04 RX ORDER — SENNA PLUS 8.6 MG/1
1 TABLET ORAL NIGHTLY
Status: DISCONTINUED | OUTPATIENT
Start: 2020-06-04 | End: 2020-06-16 | Stop reason: HOSPADM

## 2020-06-04 RX ORDER — 0.9 % SODIUM CHLORIDE 0.9 %
500 INTRAVENOUS SOLUTION INTRAVENOUS ONCE
Status: COMPLETED | OUTPATIENT
Start: 2020-06-04 | End: 2020-06-04

## 2020-06-04 RX ORDER — DOCUSATE SODIUM 100 MG/1
100 CAPSULE, LIQUID FILLED ORAL 2 TIMES DAILY
Status: DISCONTINUED | OUTPATIENT
Start: 2020-06-04 | End: 2020-06-16 | Stop reason: HOSPADM

## 2020-06-04 RX ADMIN — ENOXAPARIN SODIUM 40 MG: 40 INJECTION SUBCUTANEOUS at 09:30

## 2020-06-04 RX ADMIN — ENOXAPARIN SODIUM 40 MG: 40 INJECTION SUBCUTANEOUS at 20:28

## 2020-06-04 RX ADMIN — GABAPENTIN 100 MG: 100 CAPSULE ORAL at 09:30

## 2020-06-04 RX ADMIN — LORAZEPAM 1 MG: 1 TABLET ORAL at 21:50

## 2020-06-04 RX ADMIN — DOCUSATE SODIUM 100 MG: 100 CAPSULE, LIQUID FILLED ORAL at 09:53

## 2020-06-04 RX ADMIN — FERROUS SULFATE TAB 325 MG (65 MG ELEMENTAL FE) 325 MG: 325 (65 FE) TAB at 07:37

## 2020-06-04 RX ADMIN — Medication 10 ML: at 21:48

## 2020-06-04 RX ADMIN — SODIUM CHLORIDE 500 ML: 9 INJECTION, SOLUTION INTRAVENOUS at 16:01

## 2020-06-04 RX ADMIN — GABAPENTIN 100 MG: 100 CAPSULE ORAL at 14:30

## 2020-06-04 RX ADMIN — FOLIC ACID 1 MG: 1 TABLET ORAL at 09:53

## 2020-06-04 RX ADMIN — DOPAMINE HYDROCHLORIDE 10 MCG/KG/MIN: 320 INJECTION, SOLUTION INTRAVENOUS at 07:37

## 2020-06-04 RX ADMIN — DIVALPROEX SODIUM 500 MG: 500 TABLET, EXTENDED RELEASE ORAL at 09:30

## 2020-06-04 RX ADMIN — DOCUSATE SODIUM 100 MG: 100 CAPSULE, LIQUID FILLED ORAL at 20:28

## 2020-06-04 RX ADMIN — SERTRALINE 50 MG: 50 TABLET, FILM COATED ORAL at 20:28

## 2020-06-04 RX ADMIN — PROMETHAZINE HYDROCHLORIDE 12.5 MG: 25 TABLET ORAL at 16:57

## 2020-06-04 RX ADMIN — Medication 10 ML: at 09:00

## 2020-06-04 RX ADMIN — STANDARDIZED SENNA CONCENTRATE 8.6 MG: 8.6 TABLET ORAL at 20:28

## 2020-06-04 RX ADMIN — MIRTAZAPINE 7.5 MG: 15 TABLET, FILM COATED ORAL at 20:28

## 2020-06-04 RX ADMIN — GABAPENTIN 100 MG: 100 CAPSULE ORAL at 20:28

## 2020-06-04 RX ADMIN — PANTOPRAZOLE SODIUM 40 MG: 40 TABLET, DELAYED RELEASE ORAL at 07:37

## 2020-06-04 RX ADMIN — ACETAMINOPHEN 650 MG: 325 TABLET, FILM COATED ORAL at 07:52

## 2020-06-04 ASSESSMENT — PAIN DESCRIPTION - ONSET: ONSET: PROGRESSIVE

## 2020-06-04 ASSESSMENT — PAIN - FUNCTIONAL ASSESSMENT
PAIN_FUNCTIONAL_ASSESSMENT: PREVENTS OR INTERFERES SOME ACTIVE ACTIVITIES AND ADLS
PAIN_FUNCTIONAL_ASSESSMENT: PREVENTS OR INTERFERES SOME ACTIVE ACTIVITIES AND ADLS

## 2020-06-04 ASSESSMENT — PAIN SCALES - GENERAL
PAINLEVEL_OUTOF10: 0
PAINLEVEL_OUTOF10: 10
PAINLEVEL_OUTOF10: 0
PAINLEVEL_OUTOF10: 10
PAINLEVEL_OUTOF10: 0

## 2020-06-04 ASSESSMENT — PAIN DESCRIPTION - FREQUENCY
FREQUENCY: CONTINUOUS
FREQUENCY: CONTINUOUS

## 2020-06-04 ASSESSMENT — PAIN DESCRIPTION - PAIN TYPE
TYPE: ACUTE PAIN
TYPE: ACUTE PAIN

## 2020-06-04 ASSESSMENT — PAIN DESCRIPTION - ORIENTATION: ORIENTATION: OTHER (COMMENT)

## 2020-06-04 ASSESSMENT — PAIN DESCRIPTION - LOCATION
LOCATION: NECK
LOCATION: HEAD

## 2020-06-04 ASSESSMENT — PAIN DESCRIPTION - PROGRESSION: CLINICAL_PROGRESSION: GRADUALLY WORSENING

## 2020-06-04 ASSESSMENT — PAIN DESCRIPTION - DESCRIPTORS
DESCRIPTORS: HEADACHE;DISCOMFORT;DULL
DESCRIPTORS: ACHING

## 2020-06-04 NOTE — PROGRESS NOTES
[address] [facility]   Department of Internal Medicine   Internal Medicine Residency  MICU Progress Note    Patient:  Deidre Chaparro 44 y.o. female   MRN: [id number]       Date of Service: 2020    Allergy: Aripiprazole and Aspirin  Follow up bradycardia  Subjective       24 hour change: Stable overnight. No acute overnight issues reported. Heart rate 60-70 bpm. Dopamine at 10 mcg/kg/min. Objective     TEMPERATURE:  Current - Temp: 97.9 °F (36.6 °C); Max - Temp  Av °F (36.7 °C)  Min: 97.7 °F (36.5 °C)  Max: 98.6 °F (37 °C)  RESPIRATIONS RANGE: Resp  Avg: 15.8  Min: 11  Max: 23  PULSE RANGE: Pulse  Av.1  Min: 46  Max: 78  BLOOD PRESSURE RANGE:  Systolic (24GFK), NQQ:58 , Min:70 , PFK:114   ; Diastolic (92KKZ), ZJY:49, Min:45, Max:77    PULSE OXIMETRY RANGE: SpO2  Av.2 %  Min: 91 %  Max: 100 %    I & O - 24hr:    Intake/Output Summary (Last 24 hours) at 2020 0918  Last data filed at 2020 0700  Gross per 24 hour   Intake 982 ml   Output 1260 ml   Net -278 ml     I/O last 3 completed shifts: In: 982 [P.O.:320; I.V.:662]  Out: 1260 [Urine:1260] No intake/output data recorded. Weight change:     Physical Exam:  General Appearance:    Alert, cooperative, no acute distress. HEENT:    NC/AT, mucous membranes are moist   Neck:   Supple, no jugular venous distention. Resp:     CTAB, No wheezes, No rhonchi, no use of accessory muscles   Heart:    RRR, S1 and S2 normal, no murmur, rub or gallop.     Abdomen:     Soft, non-tender, non-distended with normal bowel sounds   Extremities:   Atraumatic, no cyanosis or edema   Pulses:  Radial and pedal pulses are intact bilaterally   Neurologic:   AAOx3, No focal motor deficit       Medications     Continuous Infusions:   DOPamine 10 mcg/kg/min (20 0737)     Scheduled Meds:   docusate sodium  100 mg Oral BID    senna  1 tablet Oral Nightly    enoxaparin  40 mg Subcutaneous BID    sodium chloride flush  10 mL Intravenous 2 times HCO3   PS      Sat%   PEEP      FIO2   FIO2        P/F          Lines:  Site  Day  Date inserted     TLC              PICC              Arterial line              Peripheral line              HD cath            Urethral Catheter Straight-tip 16 fr-Output (mL): 70 mL    Imaging Studies:        Resident's Assessment and Plan      Cy Bunch is 44 y.o. female with a PMH of     Neurologic  · Dementia  · History of CVA     Cardiovascular  Sinus bradycardia  · Complicated by syncope. · Likely due to AV cortez blocking medication, atenolol and Exelon and other psych meds  · No evidence of high-grade AV block  · EP on board  · Continue dopamine drip, wean off slowly     History of cardiac arrest     History of hypertension  · Hold home blood pressure medication  · Continue to monitor     Infectious Disease  History of COVID-19 infection  · Tested positive on initial admission  · Negative twice on 21st and 22nd  · Positive on retest on 26th  · Continue droplet isolation for now        Hematology/Oncology  · Deficiency anemia  · Continue iron tablets     Psych  History of suicidal ideation  · Bipolar type I  · Schizoaffective disorder  · Resume home medication, Depakote, Remeron, Zoloft  · Currently mood is stable       # Peptic ulcer prophylaxis:  # DVT Prophylaxis:   # Disposition:     Ganesh Hawkins M.D., PGY-1  Internal medicine resident  Attending Physician: Dr. Eleanor Rodriguez     I personally saw, examined and provided care for the patient. Radiographs, labs and medication list were reviewed by me independently. I spoke with bedside nursing, therapists and consultants. Critical care services and times documented are independent of procedures and multidisciplinary rounds with Residents. Additionally comprehensive, multidisciplinary rounds were conducted with the MICU team. The case was discussed in detail and plans for care were established.  Review of Residents documentation was conducted and revisions were made

## 2020-06-04 NOTE — PROGRESS NOTES
Cardiac Electrophysiology Inpatient Progress Note    Carolee Aviles  1970  Date of Service: 6/4/2020  PCP: Aidan Nunez MD  Electrophysiologist: Dr. Clari West        Subjective: Carolee Aviles is seen in hospital follow-up and management of: Sinus bradycardia     Briefly, this is a 44year old female with dementia, NH resident, recent suicidal attempts with subsequent hospitalization, COVID- positive status. She was found to have COVID on 5/18 and tested neg twice before discharge to NH and now presented after fall and found to be COVID positive again. We are asked to see her for bradycardia/CHB. Apparently, EMS found her to be in CHB and while in ER she went into sinus bradycardia. She is being cleared by trauma with pan CT scan. Initially Bp 80/50, dopamine was recommended to ER and now she is normotensive with sinus bradycardia, HR 50-60. Physical exam deferred to conserve PPE.     6/4/20: There is no evidence of high grade AV block, she remain on Dopamine without recurrent bradycardia.        Patient Active Problem List   Diagnosis    Trauma    Fall         Scheduled Medications:   docusate sodium  100 mg Oral BID    senna  1 tablet Oral Nightly    enoxaparin  40 mg Subcutaneous BID    sodium chloride flush  10 mL Intravenous 2 times per day    divalproex  500 mg Oral Daily    mirtazapine  7.5 mg Oral Nightly    sertraline  50 mg Oral Nightly    gabapentin  100 mg Oral TID    folic acid  1 mg Oral Daily    pantoprazole  40 mg Oral QAM AC    ferrous sulfate  325 mg Oral Daily with breakfast       Infusion Medications:   DOPamine 10 mcg/kg/min (06/04/20 1151)       PRN Medications:  sodium chloride flush, acetaminophen, polyethylene glycol, promethazine **OR** ondansetron, LORazepam    Allergies   Allergen Reactions    Aripiprazole     Aspirin        Wt Readings from Last 3 Encounters:   06/03/20 168 lb 6.9 oz (76.4 kg)     Temp Readings from Last 3 Encounters:   06/04/20 98.1 °F along the right chest wall. Hazy opacity in the upper right lung, which may represent atelectasis, consolidation or contusion. Radiographic follow-up is recommended. Pertinent Cardiac Testing:     Echocardiogram 1/4/2018:           Stress Test 1/4/2018:         Telemetry6/4/2020: sinus bradycardia     EKG 6/3/2020: sinus bradycardia no CHB seen Please see scan in Cardiology. I have independently reviewed all of the ECGs and rhythm strips per above. I have personally reviewed the laboratory, cardiac diagnostic and radiographic testing as outlined above. I have reviewed previous records noted in [address]. Impression:    1. Sinus bradycardia   - Fall with syncope and presenting with bradycardia  - No Evidence of high grade AV block at this time   - Presenting with AV node blocking agents, Atenolol and Exelon  - Continue to wean Dopamine as able and avoid AV node blocking agents. - Continue telemetry monitoring      2. COVID- 19 positive      3. Dementia      4. Anoxic brain injury     5. Bipolar 1 disorder     6. Multiple CVAs      7. Cocaine abuse     8. Depression      9. GERD     10. schizoaffective disorder     11. Hypertension      12. Hyperlipidemia       Thank you for allowing me to participate in your patient's care. Discussed with Dr. Remi Craven  Electronically signed by RON Moses CNP on 6/4/2020 at 1:20 PM   [facility]    ADDENDUM    I personally saw, examined and provided care for the patient. Radiographs, labs and medication list were reviewed by me independently. The case was discussed in detail and plans for care were established. Review of Nurse Practitioner documentation was conducted and revisions were made as appropriate. I agree with the above documented exam, problem list and plan of care.       Briefly, this is a 44year old female with dementia, NH resident, recent suicidal attempts with subsequent hospitalization, COVID- positive status. She was found to have COVID on 5/18 and tested neg twice before discharge to NH and now presented after fall and found to be COVID positive again. We are asked to see her for bradycardia/CHB. Apparently, EMS found her to be in CHB and while in ER she went into sinus bradycardia. She is being cleared by trauma with pan CT scan. Initially Bp 80/50, dopamine was recommended to ER and now she is  systolic on 10 mcg of dopamine infusion. HR today 80's while patient drinking coffee in her room         1. Bradycardia   Reviewed strips from ER and c/w Sinus bradycardia with artifact. I do not see any complete heart block with the strips reviewed   -Discontinue baseline metoprolol since she has bradycardia and some hypotension     2. Hypotension  - work up for other causes of hypotension: infection, dehydration ( bun/creat ratio elevated), check orthostatics, blood and urine cultures, TSH  - Wean dopamine as she tolerates  - she is on multiple psychiatric meds that may be contributing to her symptoms   - last echo was 2018, LVEF normal at that time. Repeat ECHO  - Recommend IV fluids (bun/creat ratio elevated) thus maybe dehydrated/volume depleted.  Ok to give bolus and hydration with NS for 24 hours      Rafael Leigh Electrophysiology

## 2020-06-04 NOTE — PROGRESS NOTES
Physical Therapy    Physical Therapy Initial Assessment     Name: Morgan Rosales  : 1970  MRN: [id number]    Referring Provider:  Odalys Clark MD    Date of Service: 2020    Evaluating PT:  Pipo Valdez PT, DPT [id number]     Room #:  1742/1205-V  Diagnosis:  Trauma -- fall from [facility] d/t syncope   PMHx/PSHx:  Anoxic brain injury, BPD, cocaine abuse, COVID-19, CVA, dementia, depression, HLD, HTN, iron deficiency anemia, schizoaffective disorder, suicidal ideation   Precautions:  Falls, Droplet Plus isolation, Covid-19 (+), Aspen Collar   Equipment Needs:  TBD    SUBJECTIVE:    Pt has been at nursing facility for ~2 years. States she is no longer working with PT/OT. Reports independence and use of no assistive devices. OBJECTIVE:   Initial Evaluation  Date: 20 Treatment Short Term/ Long Term   Goals   AM-PAC 6 Clicks 64/42     Was pt agreeable to Eval/treatment? Yes     Does pt have pain? 8/10 HA and neck pain      Bed Mobility  Rolling: Min A  Supine to sit: Min A  Sit to supine: NT  Scooting: Min A   Rolling: Independent   Supine to sit: Independent   Sit to supine: Independent   Scooting: Independent    Transfers Sit to stand: Min A  Stand to sit: Min A  Stand pivot: Min A  Sit to stand: Independent   Stand to sit:  Independent   Stand pivot: Modified Independent     Ambulation    15 feet x2 reps with [employer] Min A  >150 feet with AAD if needed Modified Independent      Stair negotiation: ascended and descended  NT  >2 steps with 1 rail SBA   ROM BUE:  Per OT eval  BLE:  WFL     Strength BUE:  Per OT eval   BLE:  4+/5     Balance Sitting EOB:  SBA  Dynamic Standing:  Min A  Sitting EOB:  Independent   Dynamic Standing:  Modified Independent       Pt is A & O x 3 -- self, location, year, and situation (not oriented to month)  RASS:  0  CAM-ICU:  Negative   Sensation:  Pt reports numbness and tingling to B hands and B feet   Edema:  Unremarkable     Vitals:  Blood Pressure at rest 104/67 Blood

## 2020-06-04 NOTE — PROGRESS NOTES
CT Thoracic Spine WO Contrast   Final Result   No fracture or joint dislocation. CT Lumbar Spine WO Contrast   Final Result    1. No fracture or joint dislocation. 2. Small disc bulges resulting in mild neural foraminal stenoses at   L4-5 and L5-S1. XR CHEST 1 VW   Final Result   Hazy opacity in the upper right lung, which may represent atelectasis,   consolidation or contusion. Radiographic follow-up is recommended. XR PELVIS (1-2 VIEWS)   Final Result   No acute osseous abnormality seen. PHYSICAL EXAM:   GCS:  4 - Opens eyes on own   6 - Follows simple motor commands  5 - Alert and oriented    Pupil size:  Left 4 mm Right 4 mm  Pupil reaction: Yes  Wiggles fingers: Left Yes Right Yes  Hand grasp:   Left normal   Right normal  Wiggles toes: Left Yes    Right Yes  Plantar flexion: Left normal  Right normal    General: NAD, awake and alert. Head: Normocephalic, atraumatic  Eyes: PERRLA, EOMI. No sclera icterus. Lungs: No increased work of breathing. Cardiovascular: RRR. Abdomen: Soft, ND, NT. No rebound, guarding or rigidity.   Extremities: Atraumatic, full range of motion  Skin: Warm, dry and intact    Spine:     Spine Tenderness ROM   Cervical 2 /10 Abnormal, ccollar   Thoracic 3 /10 Normal   Lumbar 0 /10 Normal     Musculoskeletal    Joint Tenderness Swelling ROM   Right shoulder absent absent normal   Left shoulder absent absent normal   Right elbow absent absent normal   Left elbow absent absent normal   Right wrist absent absent normal   Left wrist absent absent normal   Right hand grasp absent absent normal   Left hand grasp absent absent normal   Right hip absent absent normal   Left hip absent absent normal   Right knee absent absent normal   Left knee absent absent normal   Right ankle absent absent normal   Left ankle absent absent normal   Right foot absent absent normal   Left foot absent absent normal       CONSULTS: IM    PROCEDURES:     INJURIES:

## 2020-06-04 NOTE — PLAN OF CARE
Problem: Falls - Risk of:  Goal: Will remain free from falls  Description: Will remain free from falls  6/4/2020 1125 by Asha Santana RN  Outcome: Met This Shift     Problem: Falls - Risk of:  Goal: Absence of physical injury  Description: Absence of physical injury  6/4/2020 1125 by Asha Santana RN  Outcome: Met This Shift     Problem: Pain:  Goal: Pain level will decrease  Description: Pain level will decrease  Outcome: Met This Shift     Problem: Pain:  Goal: Control of acute pain  Description: Control of acute pain  Outcome: Met This Shift     Problem: Pain:  Goal: Control of chronic pain  Description: Control of chronic pain  Outcome: Met This Shift     Problem: Suicide risk  Goal: Provide patient with safe environment  Description: Provide patient with safe environment  Outcome: Met This Shift

## 2020-06-04 NOTE — PROGRESS NOTES
Neurosurgical consult received. Patient c/o persistent neck pain. CT cervical spine reviewed, no fractures or acute abnormalities noted. Patient is COVID positive. Unable to obtain MRI.  In order to reduce exposure and conserve PPE we will see patient for follow up in 2 weeks.    -No surgical intervention needed  -Continue cervical collar x 2 weeks  -Pain control

## 2020-06-04 NOTE — PROGRESS NOTES
OCCUPATIONAL THERAPY INITIAL EVALUATION      Date:2020  Patient Name: Deidre Chaparro  MRN: [id number]  : 1970  Room: [address] [place], [place] [address]    Evaluating OT: Erika Petty OTR/L  Referring Provider: Chase Dumont MD    AM-PAC Daily Activity Raw Score: 1824  Recommended Adaptive Equipment: to be determined     Comments: Based on patient's functional performance as stated below and level of assistance needed prior to admission, this therapist believes that the patient will likely have no skilled OT needs following hospital discharge. Diagnosis: Trauma [T14.90XA]  Trauma [T14.90XA]   Pertinent Medical History: anoxic brain injury, bipolar 1 disorder, cocaine abuse, covid-19, CVA, dementia, depressed, HLD, HTN, iron deficiency anemia, schizo affective, suicidal ideations    Precautions:  Falls, aspen collar     Home Living: Pt admitted from SNF. Prior Level of Function: Independent with ADLs assistance with IADLs; using no device for ambulation.   Driving: no  Occupation: former     Pain Level: reports 8/10 cervical pain  Cognition: A&O: 4/4; Follows 2-3 step directions, latent, flat affect   Memory: F   Sequencing: F   Problem solving: F   Judgement/safety: F   RASS: 0  CAM-ICU: negative     Functional Assessment:   Initial Eval Status  Date: 20 Treatment Status  Date: STG=LTG (~5-14  days)     Feeding Set up A        Grooming SBA  Standing; to wash hands at sink    improve grooming/hygiene tasks to independent   UB Dressing Min A      improve UB dressing to independent   LB Dressing Min A    improve LB dressing to mod I/independent with AE PRN   Bathing Min A    improve UB/LB bathing to SBA   Toileting Min A    improve toileting task and all clothing mgmt to independent   Bed Mobility  Supine to sit: Min A   Sit to supine: Min A  improve bed mobility to independent in prep for EOB ADL tasks   Functional Transfers Sit to stand: Min A  Stand to sit: Min A  improve all functional transfers to independent   Functional Mobility Min A ww  For in-room distance  improve functional mobility to independent   Balance Sitting:     Static: supervision    Dynamic: SBA  Standing: Min A  demo independent dynamic sitting balance EOB during ADL tasks   Endurance/Activity Tolerance F  demo G activity tolerance/endurance during 15-20 min ADL task    Visual/  Perceptual Glasses: G              Hand dominance: R  UE ROM: RUE: WFL  LUE: WFL  Strength: RUE: grossly WFL LUE: grossly WFL   Strength: WFL  Fine Motor Coordination: WFL    Hearing: WFL  Sensation: Reports N/T pain in B feet and B hands  Tone: WNL  Edema: unremarkable    Vitals:  Blood Pressure at rest 104/67 Blood Pressure post session 93/67   Heart Rate at rest 60 bpm Heart Rate post session 65 bpm   SPO2 at rest 97% on RA SPO2 post session 99% on RA   EOB: 104/75  Standin/63                             Comments: Attended interdisciplinary rounds. OK from RN to see patient. Session completed with PT collaboration. Upon arrival patient supine with HOB slightly elevated. After session, patient up in chair with chair alarm on- with all devices within reach, all lines and tubes intact, nursing notified. Pt required cues and education as noted above for safe facilitation and completion of tasks. Therapist provided skilled monitoring of HR, O2 saturation, blood pressure and patient's response during treatment session. Prior to and at the end of session, environmental modifications/line management completed for patients safety and efficiency of treatment session. Overall, patient demonstrates mild difficulties with completion of BADLs and IADLs. Factors contributing to these difficulties include need for cervical collar, decreased endurance, and generalized weakness. Required co-session with PT due to medical acuity and level of physical assistance needed.  As noted above, patient likely to benefit from further OT intervention to

## 2020-06-04 NOTE — PROGRESS NOTES
Murtaza Hernandez notified for consult for Dr. Pipe Montes De Oca.       Electronically signed by Roxy Rascon RN MSN APRN-NP [facility] NP  [facility] CCRN 6/4/2020 10:23 AM    ]

## 2020-06-04 NOTE — PROGRESS NOTES
Pt seen/ examined. ROS x 10 neg. Except: 44year old female with dementia, NH resident, recent suicidal attempts with subsequent hospitalization, COVID- positive status. She was found to have COVID on 5/18 and tested neg twice before discharge to NH and now presented after fall and found to be COVID positive again. I am  asked to assume care. . Apparently, EMS found her to be in [facility] and while in ER she went into sinus bradycardia. She is being cleared by trauma with pan CT scan. Initially Bp 80/50, dopamine was recommended to ER and now she is normotensive with sinus bradycardia, HR 50-60. Chart reviewed. meds reviewed. D/w nursing + family as available. EXAM:  BP 97/73   Pulse 57   Temp 98.1 °F (36.7 °C)   Resp 15   Ht 5' 5\" (1.651 m)   Wt 168 lb 6.9 oz (76.4 kg)   SpO2 98%   BMI 28.03 kg/m²   GEN: A+O NAD. HEENT:NCAT. EOMI. IAM   NECK:  No JVD. No bruits. Trach midline. No thyromegaly. LUNGS: CTA w/o rales, rhonchi, wheezes. Good excursion. CV: rrr w/o mrg  ABD: soft, non tender. Nl BS. No organomegaly. EXT: no clubbing, cyanosis, edema. NEURO: answers questions.  No focal deficits  Labs/data reviewed  LABS: CBC:   Lab Results   Component Value Date    WBC 9.3 06/04/2020    RBC 3.67 06/04/2020    HGB 12.3 06/04/2020    HCT 36.7 06/04/2020    .0 06/04/2020    MCH 33.5 06/04/2020    MCHC 33.5 06/04/2020    RDW 12.2 06/04/2020     06/04/2020    MPV 11.0 06/04/2020     CMP:    Lab Results   Component Value Date     06/04/2020    K 4.0 06/04/2020     06/04/2020    CO2 24 06/04/2020    BUN 25 06/04/2020    CREATININE 0.8 06/04/2020    GFRAA >60 06/04/2020    LABGLOM >60 06/04/2020    GLUCOSE 117 06/04/2020    PROT 6.7 06/03/2020    LABALBU 4.1 06/03/2020    CALCIUM 8.6 06/04/2020    BILITOT 0.2 06/03/2020    ALKPHOS 44 06/03/2020    AST 28 06/03/2020    ALT 20 06/03/2020       Medications:    Scheduled Meds:   docusate sodium  100 mg Oral BID    senna  1 tablet Oral Nightly   

## 2020-06-05 LAB
ACINETOBACTER BAUMANNII BY PCR: NOT DETECTED
ANION GAP SERPL CALCULATED.3IONS-SCNC: 10 MMOL/L (ref 7–16)
BASOPHILS ABSOLUTE: 0.05 E9/L (ref 0–0.2)
BASOPHILS RELATIVE PERCENT: 0.8 % (ref 0–2)
BOTTLE TYPE: ABNORMAL
BUN BLDV-MCNC: 18 MG/DL (ref 6–20)
CALCIUM SERPL-MCNC: 8.4 MG/DL (ref 8.6–10.2)
CANDIDA ALBICANS BY PCR: NOT DETECTED
CANDIDA GLABRATA BY PCR: NOT DETECTED
CANDIDA KRUSEI BY PCR: NOT DETECTED
CANDIDA PARAPSILOSIS BY PCR: NOT DETECTED
CANDIDA TROPICALIS BY PCR: NOT DETECTED
CARBAPENEM RESISTANCE KPC BY PCR: NOT DETECTED
CHLORIDE BLD-SCNC: 109 MMOL/L (ref 98–107)
CO2: 31 MMOL/L (ref 22–29)
CREAT SERPL-MCNC: 0.8 MG/DL (ref 0.5–1)
EKG ATRIAL RATE: 47 BPM
EKG ATRIAL RATE: 54 BPM
EKG P AXIS: 60 DEGREES
EKG P AXIS: 73 DEGREES
EKG P-R INTERVAL: 144 MS
EKG P-R INTERVAL: 146 MS
EKG Q-T INTERVAL: 426 MS
EKG Q-T INTERVAL: 478 MS
EKG QRS DURATION: 80 MS
EKG QRS DURATION: 82 MS
EKG QTC CALCULATION (BAZETT): 403 MS
EKG QTC CALCULATION (BAZETT): 423 MS
EKG R AXIS: 50 DEGREES
EKG R AXIS: 76 DEGREES
EKG T AXIS: 73 DEGREES
EKG T AXIS: 82 DEGREES
EKG VENTRICULAR RATE: 47 BPM
EKG VENTRICULAR RATE: 54 BPM
ENTEROBACTER CLOACAE COMPLEX BY PCR: NOT DETECTED
ENTEROBACTERALES BY PCR: DETECTED
ENTEROCOCCUS BY PCR: NOT DETECTED
EOSINOPHILS ABSOLUTE: 0.42 E9/L (ref 0.05–0.5)
EOSINOPHILS RELATIVE PERCENT: 6.5 % (ref 0–6)
ESCHERICHIA COLI BY PCR: NOT DETECTED
GFR AFRICAN AMERICAN: >60
GFR NON-AFRICAN AMERICAN: >60 ML/MIN/1.73
GLUCOSE BLD-MCNC: 87 MG/DL (ref 74–99)
HAEMOPHILUS INFLUENZAE BY PCR: NOT DETECTED
HCT VFR BLD CALC: 35.3 % (ref 34–48)
HEMOGLOBIN: 11.4 G/DL (ref 11.5–15.5)
IMMATURE GRANULOCYTES #: 0.01 E9/L
IMMATURE GRANULOCYTES %: 0.2 % (ref 0–5)
KLEBSIELLA OXYTOCA BY PCR: NOT DETECTED
KLEBSIELLA PNEUMONIAE GROUP BY PCR: NOT DETECTED
LISTERIA MONOCYTOGENES BY PCR: NOT DETECTED
LYMPHOCYTES ABSOLUTE: 2.62 E9/L (ref 1.5–4)
LYMPHOCYTES RELATIVE PERCENT: 40.6 % (ref 20–42)
MCH RBC QN AUTO: 32.9 PG (ref 26–35)
MCHC RBC AUTO-ENTMCNC: 32.3 % (ref 32–34.5)
MCV RBC AUTO: 102 FL (ref 80–99.9)
METHICILLIN RESISTANCE MECA/C  BY PCR: DETECTED
MONOCYTES ABSOLUTE: 0.66 E9/L (ref 0.1–0.95)
MONOCYTES RELATIVE PERCENT: 10.2 % (ref 2–12)
NEISSERIA MENINGITIDIS BY PCR: NOT DETECTED
NEUTROPHILS ABSOLUTE: 2.7 E9/L (ref 1.8–7.3)
NEUTROPHILS RELATIVE PERCENT: 41.7 % (ref 43–80)
ORDER NUMBER: ABNORMAL
PDW BLD-RTO: 12.8 FL (ref 11.5–15)
PLATELET # BLD: 167 E9/L (ref 130–450)
PMV BLD AUTO: 11 FL (ref 7–12)
POTASSIUM REFLEX MAGNESIUM: 4.2 MMOL/L (ref 3.5–5)
PROTEUS BY PCR: DETECTED
PSEUDOMONAS AERUGINOSA BY PCR: NOT DETECTED
RBC # BLD: 3.46 E12/L (ref 3.5–5.5)
SERRATIA MARCESCENS BY PCR: NOT DETECTED
SODIUM BLD-SCNC: 150 MMOL/L (ref 132–146)
SOURCE OF BLOOD CULTURE: ABNORMAL
STAPHYLOCOCCUS AUREUS BY PCR: NOT DETECTED
STAPHYLOCOCCUS SPECIES BY PCR: DETECTED
STREPTOCOCCUS AGALACTIAE BY PCR: NOT DETECTED
STREPTOCOCCUS PNEUMONIAE BY PCR: NOT DETECTED
STREPTOCOCCUS PYOGENES  BY PCR: NOT DETECTED
STREPTOCOCCUS SPECIES BY PCR: NOT DETECTED
WBC # BLD: 6.5 E9/L (ref 4.5–11.5)

## 2020-06-05 PROCEDURE — 6370000000 HC RX 637 (ALT 250 FOR IP): Performed by: STUDENT IN AN ORGANIZED HEALTH CARE EDUCATION/TRAINING PROGRAM

## 2020-06-05 PROCEDURE — 2580000003 HC RX 258: Performed by: INTERNAL MEDICINE

## 2020-06-05 PROCEDURE — 85025 COMPLETE CBC W/AUTO DIFF WBC: CPT

## 2020-06-05 PROCEDURE — 6360000002 HC RX W HCPCS: Performed by: INTERNAL MEDICINE

## 2020-06-05 PROCEDURE — 93010 ELECTROCARDIOGRAM REPORT: CPT | Performed by: INTERNAL MEDICINE

## 2020-06-05 PROCEDURE — 6360000002 HC RX W HCPCS: Performed by: STUDENT IN AN ORGANIZED HEALTH CARE EDUCATION/TRAINING PROGRAM

## 2020-06-05 PROCEDURE — 99233 SBSQ HOSP IP/OBS HIGH 50: CPT | Performed by: INTERNAL MEDICINE

## 2020-06-05 PROCEDURE — 6370000000 HC RX 637 (ALT 250 FOR IP): Performed by: INTERNAL MEDICINE

## 2020-06-05 PROCEDURE — 80048 BASIC METABOLIC PNL TOTAL CA: CPT

## 2020-06-05 PROCEDURE — 2580000003 HC RX 258: Performed by: STUDENT IN AN ORGANIZED HEALTH CARE EDUCATION/TRAINING PROGRAM

## 2020-06-05 PROCEDURE — 2140000000 HC CCU INTERMEDIATE R&B

## 2020-06-05 PROCEDURE — 36415 COLL VENOUS BLD VENIPUNCTURE: CPT

## 2020-06-05 RX ORDER — DEXTROSE AND SODIUM CHLORIDE 5; .45 G/100ML; G/100ML
INJECTION, SOLUTION INTRAVENOUS CONTINUOUS
Status: ACTIVE | OUTPATIENT
Start: 2020-06-05 | End: 2020-06-05

## 2020-06-05 RX ADMIN — SERTRALINE 50 MG: 50 TABLET, FILM COATED ORAL at 21:07

## 2020-06-05 RX ADMIN — FOLIC ACID 1 MG: 1 TABLET ORAL at 08:27

## 2020-06-05 RX ADMIN — MIRTAZAPINE 7.5 MG: 15 TABLET, FILM COATED ORAL at 21:07

## 2020-06-05 RX ADMIN — ACETAMINOPHEN 650 MG: 325 TABLET, FILM COATED ORAL at 21:12

## 2020-06-05 RX ADMIN — ENOXAPARIN SODIUM 40 MG: 40 INJECTION SUBCUTANEOUS at 21:08

## 2020-06-05 RX ADMIN — GABAPENTIN 100 MG: 100 CAPSULE ORAL at 16:20

## 2020-06-05 RX ADMIN — DEXTROSE AND SODIUM CHLORIDE: 5; 450 INJECTION, SOLUTION INTRAVENOUS at 12:19

## 2020-06-05 RX ADMIN — ACETAMINOPHEN 650 MG: 325 TABLET, FILM COATED ORAL at 16:20

## 2020-06-05 RX ADMIN — GABAPENTIN 100 MG: 100 CAPSULE ORAL at 21:07

## 2020-06-05 RX ADMIN — Medication 10 ML: at 08:27

## 2020-06-05 RX ADMIN — STANDARDIZED SENNA CONCENTRATE 8.6 MG: 8.6 TABLET ORAL at 21:07

## 2020-06-05 RX ADMIN — DOCUSATE SODIUM 100 MG: 100 CAPSULE, LIQUID FILLED ORAL at 21:07

## 2020-06-05 RX ADMIN — FERROUS SULFATE TAB 325 MG (65 MG ELEMENTAL FE) 325 MG: 325 (65 FE) TAB at 08:28

## 2020-06-05 RX ADMIN — ACETAMINOPHEN 650 MG: 325 TABLET, FILM COATED ORAL at 12:20

## 2020-06-05 RX ADMIN — GABAPENTIN 100 MG: 100 CAPSULE ORAL at 08:30

## 2020-06-05 RX ADMIN — DOCUSATE SODIUM 100 MG: 100 CAPSULE, LIQUID FILLED ORAL at 08:28

## 2020-06-05 RX ADMIN — PANTOPRAZOLE SODIUM 40 MG: 40 TABLET, DELAYED RELEASE ORAL at 05:54

## 2020-06-05 RX ADMIN — CEFTRIAXONE 2 G: 2 INJECTION, POWDER, FOR SOLUTION INTRAMUSCULAR; INTRAVENOUS at 16:20

## 2020-06-05 RX ADMIN — ENOXAPARIN SODIUM 40 MG: 40 INJECTION SUBCUTANEOUS at 08:30

## 2020-06-05 RX ADMIN — LORAZEPAM 1 MG: 1 TABLET ORAL at 21:12

## 2020-06-05 RX ADMIN — DIVALPROEX SODIUM 500 MG: 500 TABLET, EXTENDED RELEASE ORAL at 08:30

## 2020-06-05 RX ADMIN — CEFEPIME 2 G: 2 INJECTION, POWDER, FOR SOLUTION INTRAVENOUS at 12:19

## 2020-06-05 ASSESSMENT — PAIN DESCRIPTION - LOCATION
LOCATION: FOOT
LOCATION: FOOT

## 2020-06-05 ASSESSMENT — PAIN DESCRIPTION - ORIENTATION
ORIENTATION: LEFT;RIGHT
ORIENTATION: RIGHT

## 2020-06-05 ASSESSMENT — PAIN SCALES - GENERAL
PAINLEVEL_OUTOF10: 10
PAINLEVEL_OUTOF10: 3
PAINLEVEL_OUTOF10: 0
PAINLEVEL_OUTOF10: 3
PAINLEVEL_OUTOF10: 3

## 2020-06-05 ASSESSMENT — PAIN DESCRIPTION - DESCRIPTORS
DESCRIPTORS: SHARP
DESCRIPTORS: ACHING

## 2020-06-05 ASSESSMENT — PAIN DESCRIPTION - FREQUENCY: FREQUENCY: CONTINUOUS

## 2020-06-05 ASSESSMENT — PAIN DESCRIPTION - PAIN TYPE
TYPE: CHRONIC PAIN
TYPE: ACUTE PAIN

## 2020-06-05 ASSESSMENT — PAIN - FUNCTIONAL ASSESSMENT: PAIN_FUNCTIONAL_ASSESSMENT: PREVENTS OR INTERFERES SOME ACTIVE ACTIVITIES AND ADLS

## 2020-06-05 NOTE — PROGRESS NOTES
Encounters:   20 97.6 °F (36.4 °C)     BP Readings from Last 3 Encounters:   20 116/80     Pulse Readings from Last 3 Encounters:   20 57         Intake/Output Summary (Last 24 hours) at 2020 0918  Last data filed at 2020 0834  Gross per 24 hour   Intake 943.2 ml   Output 650 ml   Net 293.2 ml       ROS:   Unable to assess      PHYSICAL EXAM:  Vitals:    20 0500 20 0600 20 0700 20 0800   BP: 81/60 (!) 137/97 127/78 116/80   Pulse: 54 76 51 57   Resp:    Temp:    97.6 °F (36.4 °C)   TempSrc:       SpO2:   97% 97%   Weight:  166 lb 7.2 oz (75.5 kg)     Height:         Unable to assess due to current COVID status. Pertinent Labs:  CBC:   Recent Labs     20  1240 20  0420 20  0555   WBC 8.5 9.3 6.5   HGB 12.7 12.3 11.4*   HCT 38.2 36.7 35.3    193 167     BMP:  Recent Labs     20  1240 20  1246 20  1247 20  0420 20  0555     --   --  140 150*   K 5.0  --  4.11 4.0 4.2     --   --  103 109*   CO2 23  --   --  24 31*   BUN 21*  --   --  25* 18   CREATININE 1.0 1.1*  --  0.8 0.8   CALCIUM 8.7  --   --  8.6 8.4*     ABGs: No results found for: PHART, PO2ART, OEY7OGT  INR:   Recent Labs     20  1240   INR 1.0     BNP: No results for input(s): BNP in the last 72 hours. TSH:   Lab Results   Component Value Date    TSH 0.488 2020      Cardiac Injury Profile:   Recent Labs     20  1240   TROPONINI <0.01      Lipid Profile: No results found for: TRIG, HDL, LDLCALC, CHOL   Hemoglobin A1C: No components found for: HGBA1C   Xray: Xr Pelvis (1-2 Views)    Result Date: 6/3/2020  Patient MRN:  [id number] : 1970 Age: 44 years Gender: Female Order Date:  6/3/2020 1:15 PM EXAM: XR PELVIS (1-2 VIEWS) NUMBER OF IMAGES:   1  INDICATION:  TRAUMA TRAUMA COMPARISON: Britta 3, 2020 TECHNIQUE: AP view of the pelvis. FINDINGS: No fracture or dislocation seen.  Bone mineralization is normal for at that C5-6 and C6-7 resulting in at least mild central canal stenoses. Mild-to-moderate neural foraminal stenoses at C6-7. Evleyn Curia PARASPINAL SOFT TISSUES:Unremarkable. MISCELLANEOUS:No additional significant finding noted. 1. No fracture or joint dislocation. 2. Degenerative changes, as described. Ct Thoracic Spine Wo Contrast    Result Date: 6/3/2020  Patient MRN:  [id number] : 1970 Age: 44 years Gender: Female Order Date:  6/3/2020 1:00 PM EXAM: CT THORACIC SPINE WO CONTRAST INDICATION:  trauma trauma COMPARISON: None Multiple CT sections were obtained with sagittal and coronal MPR reconstructions. FINDINGS: BONES: No fracture or joint dislocation. Vertebral body heights are preserved. No bony destructive lesion. One [address] SPACES:Unremarkable. PARASPINAL SOFT TISSUES:Unremarkable. MISCELLANEOUS:No additional significant finding noted. No fracture or joint dislocation. Ct Lumbar Spine Wo Contrast    Result Date: 6/3/2020  Patient MRN: [id number] : 1970 Age:  44 years Gender: Female Order Date: 6/3/2020 1:00 PM Exam: CT LUMBAR SPINE WO CONTRAST Indication:   TRAUMA trauma Comparison: None. FINDINGS: No fracture or joint dislocation. The lumbar lordosis is preserved. Vertebral body heights are intact. No bony destructive lesion is seen. L1-2: Unremarkable. L2-3: Unremarkable. L3-4: Unremarkable. L4-5: Small disc bulge. Mild facet hypertrophy. No central canal or lateral recess stenosis. Mild neural foraminal stenoses. L5-S1: Mild loss of disc height with small disc bulge. Mild facet hypertrophy. No central canal or lateral recess stenosis. Mild neural foraminal stenoses. 1. No fracture or joint dislocation. 2. Small disc bulges resulting in mild neural foraminal stenoses at L4-5 and L5-S1.     Xr Chest 1 Vw    Result Date: 6/3/2020  Patient MRN:  [id number] : 1970 Age: 44 years Gender: Female Order Date:  6/3/2020 1:15 PM EXAM: XR CHEST 1 VIEW NUMBER OF IMAGES:  1 INDICATION:  TRAUMA sign off    Mike Culver M.D  [facility] Electrophysiology

## 2020-06-05 NOTE — PROGRESS NOTES
Nutrition Assessment    Type and Reason for Visit: Initial(LOS ICU)    Nutrition Recommendations: Continue current diet, Start ONS    Nutrition Assessment: Pt at nutritional risk d/t average <75% po intake since admit s/p fall +Covid19. Noted hx cocaine abuse. Will add Ensure Enlive BID & continue to monitor. Malnutrition Assessment:  · Malnutrition Status: Insufficient data  · Findings of the 6 clinical characteristics of malnutrition (Minimum of 2 out of 6 clinical characteristics is required to make the diagnosis of moderate or severe Protein Calorie Malnutrition based on AND/ASPEN Guidelines):  1. Energy Intake-Less than or equal to 75% of estimated energy requirement   2.  (since admit x 2 days, ANGELA PTA intake)    3. Weight Loss-Unable to assess(no EMR hx on file)   4. Fat Loss-Unable to assess(d/t isolation)  5. Muscle Loss-Unable to assess(d/t isolation)  6. Fluid Accumulation-No significant fluid accumulation  7.   Strength-Not measured    Nutrition Risk Level: Low    Nutrient Needs:  · Estimated Daily Total Kcal: 6264-9655 (MSJ REE 1374 x 1.2 SF)  · Estimated Daily Protein (g): 70-80(1.2-1.4 g/kg)  · Estimated Daily Total Fluid (ml/day): per critical care     Nutrition Diagnosis:   · Problem: Inadequate oral intake  · Etiology: related to Insufficient energy/nutrient consumption(2/2 viral infection)     Signs and symptoms:  as evidenced by Intake 50-75%    Objective Information:  · Nutrition-Focused Physical Findings: A&Ox4, hypotension improved off pressor, fluid bal WNL, no edema, active BS, hypernatremia      · Wound Type: None     · Current Nutrition Therapies:  · Oral Diet Orders: General   · Oral Diet intake: 51-75%(per doc flow)     · Anthropometric Measures:  · Ht: 5' 5\" (165.1 cm)   · Current Body Wt: 166 lb (75.3 kg)(6/5 bedscale)  · Admission Body Wt: 167 lb (75.8 kg)(6/3 first measured )  · Usual Body Wt: (UTO no EMR hx on file )  · % Weight Change: Unable to properly assess wt

## 2020-06-05 NOTE — CARE COORDINATION
Care coordination:  Pt was a transfer from MICU today  Pt originally from [facility]  covid positive. I spoke to facility, spoke to Johnathan Larson and pt is able to come back Covid positive without any repeat testing [phone].   Previous  indicates that she has completed transport envelope     Sheldon Rhoades

## 2020-06-05 NOTE — PLAN OF CARE
Problem: Falls - Risk of:  Goal: Will remain free from falls  Description: Will remain free from falls  Outcome: Met This Shift  Goal: Absence of physical injury  Description: Absence of physical injury  Outcome: Met This Shift     Problem: Pain:  Goal: Pain level will decrease  Description: Pain level will decrease  Outcome: Met This Shift  Goal: Control of acute pain  Description: Control of acute pain  Outcome: Met This Shift  Goal: Control of chronic pain  Description: Control of chronic pain  Outcome: Met This Shift     Problem: Suicide risk  Goal: Provide patient with safe environment  Description: Provide patient with safe environment  Outcome: Met This Shift

## 2020-06-05 NOTE — CARE COORDINATION
Patient remains in MICU on room air; dopamine gtt off. Patient has transfer order and was assigned to room 6417B. Patient has a hx falls- aspen collar on currently, CVA, bipolar, schizo affective, suicidal ideations, depressed, dementia. Patient came to us from [facility] and (+) for Covid from facility; per liaison patient is a bedhold and does not need a precert to return. I asked if facility could take patient back Covid (+) or if they needed (-) Covid prior to returning- awaiting call back on that. I called patient's  Lyndsey Sam to discuss transition of care at discharge. He states patient has been at [facility] for approximately 6 months and plan is for her to return to this facility when medically stable. Envelope and ambulance form on soft chart. CM/SW will continue to follow for discharge planning.

## 2020-06-05 NOTE — PROGRESS NOTES
[REMOVED] Urethral Catheter Straight-tip 16 fr-Output (mL): 400 mL    Imaging Studies:    CT Cervical Spine WO Contrast   Final Result   1. No fracture or joint dislocation. 2. Degenerative changes, as described. CT Head WO Contrast   Final Result   1. No skull fracture or acute intracranial abnormality. 2. Moderate volume loss in the cerebrum, the extent of which is   greater than is typical for age. 3. Volume loss in the left occipital lobe, with ex vacuo dilation of   the occipital horn. Findings likely represent result of a chronic   infarction, although no encephalomalacia is evident by CT. 4. Mild chronic mucosal thickening in the paranasal sinuses, with   small air-fluid level in the left maxillary sinus. Clinical   correlation for acute sinusitis recommended. CT Thoracic Spine WO Contrast   Final Result   No fracture or joint dislocation. CT Lumbar Spine WO Contrast   Final Result    1. No fracture or joint dislocation. 2. Small disc bulges resulting in mild neural foraminal stenoses at   L4-5 and L5-S1. XR CHEST 1 VW   Final Result   Hazy opacity in the upper right lung, which may represent atelectasis,   consolidation or contusion. Radiographic follow-up is recommended. XR PELVIS (1-2 VIEWS)   Final Result   No acute osseous abnormality seen.                    Resident's Assessment and Plan     Neurologic  Dementia  History of CVA     Cardiovascular  Sinus bradycardia  Complicated by syncope- NSG to f/u in 2 weeks, trauma surgery f/u  Likely due to AV cortez blocking medication, atenolol and Exelon and other psych meds  No evidence of high-grade AV block  EP on board  Off dopamine since last night, HR 50-70s, other vitals overall stable      History of cardiac arrest     History of hypertension  Hold home blood pressure medication  Continue to monitor     Infectious Disease  History of COVID-19 infection  Tested positive on initial admission  Negative twice on 21st and 22nd  Positive on retest on 26th  Continue droplet isolation for now    Positive Blood Cx  One tube positive for gram pos cocci in clusters, likely contaminate  Afebrile, no elevated WBC  Continue to monitor    Renal  Hypernatermia  Likely 2/2 decreased PO intake, emesis yesterday  Tolerating general diet now  Will start D5 0.45 75 cc       Hematology/Oncology  Deficiency anemia  Continue iron tablets     Psych  History of suicidal ideation  Bipolar type I  Schizoaffective disorder  Resume home medication, Depakote, Remeron, Zoloft  Currently mood is stable      # Peptic ulcer prophylaxis: protonix  # DVT Prophylaxis: lovenox  # Disposition: transfer     Idalia Gomes M.D., PGY-1  Family Medicine resident  Attending Physician: Dr. Alexandre Downing    I personally saw, examined and provided care for the patient. Radiographs, labs and medication list were reviewed by me independently. I spoke with bedside nursing, therapists and consultants. Critical care services and times documented are independent of procedures and multidisciplinary rounds with Residents. Additionally comprehensive, multidisciplinary rounds were conducted with the MICU team. The case was discussed in detail and plans for care were established. Review of Residents documentation was conducted and revisions were made as appropriate. I agree with the above documented exam, problem list and plan of care.   radycardia ,resolved  Off dopamine  No symptoms  BC ? contamination   Will transfer   Primary to follow on culture   To start IV fluid ,Na 15    Kaushal Morton MD,[facility]  Pulmonary&Critical Care Medicine   Director of [address] [place] [address] Director of [address] [place] [address]    Greg Holt

## 2020-06-05 NOTE — CONSULTS
[facility] CONSULT NOTE    Reason for Consult: Proteus bacteremia  Requesting Physician: Dr. Gideon Clifford     Chief complaint: Syncope    History Obtained From: Patient and EMR     HISTORY Candy              The patient is a 44 y.o. female with history of stroke, bipolar disorder, hypertension, hyperlipidemia, on nitrofurantoin at the nursing home, presented on 06/03 after fall from syncope, found to be bradycardic. She is known to have tested positive for COVID-19. On admission, she was afebrile and hemodynamically stable with no leukocytosis. Urinalysis showed insignificant pyuria with urine culture growing more than 100,000 CFU per mL gram-negative rods. Blood cultures showed gram-positive cocci in clusters (methicillin-resistant Staphylococcus sp NOT aureus and Proteus by PCR). She received a dose of cefepime on 06/05. ID service was subsequently consulted for further recommendations.     Past Medical History  Past Medical History:   Diagnosis Date    [facility]     Bipolar 1 disorder ([facility]     Cocaine abuse ([facility]     COVID-19     CVA (cerebral vascular accident) ([facility]     multiple    Dementia ([facility]     Depressed     HLD (hyperlipidemia)     HTN (hypertension)     Iron deficiency anemia     Schizo affective schizophrenia ([facility]     Suicidal ideations        Current Facility-Administered Medications   Medication Dose Route Frequency Provider Last Rate Last Dose    dextrose 5 % and 0.45 % sodium chloride infusion   Intravenous Continuous Reny Singh MD 75 mL/hr at 06/05/20 1219      cefepime (MAXIPIME) 2 g IVPB extended (mini-bag)  2 g Intravenous Once Gideon Clifford MD 12.5 mL/hr at 06/05/20 1219 2 g at 06/05/20 1219    docusate sodium (COLACE) capsule 100 mg  100 mg Oral BID Carmita Arriaza MD   100 mg at 06/05/20 0828    senna (SENOKOT) tablet 8.6 mg  1 tablet Oral Nightly Carmita Arriaza MD   8.6 mg at 06/04/20 2028    enoxaparin (LOVENOX) injection 40 mg  40 mg

## 2020-06-05 NOTE — PROGRESS NOTES
cefepime  2 g Intravenous Once    docusate sodium  100 mg Oral BID    senna  1 tablet Oral Nightly    enoxaparin  40 mg Subcutaneous BID    sodium chloride flush  10 mL Intravenous 2 times per day    divalproex  500 mg Oral Daily    mirtazapine  7.5 mg Oral Nightly    sertraline  50 mg Oral Nightly    gabapentin  100 mg Oral TID    folic acid  1 mg Oral Daily    pantoprazole  40 mg Oral QAM AC    ferrous sulfate  325 mg Oral Daily with breakfast       Continuous Infusions:   dextrose 5 % and 0.45 % NaCl 75 mL/hr at 06/05/20 1219       PRN Meds:perflutren lipid microspheres, sodium chloride flush, acetaminophen, polyethylene glycol, LORazepam    A/P:      Patient Active Problem List   Diagnosis    Trauma    Fall    psych d/o  Sinus bradycardia   - Fall with syncope and presenting with bradycardia  - No Evidence of high grade AV block at this time   - Presenting with AV node blocking agents, Atenolol and Exelon  - Continue to wean Dopamine as able and avoid AV node blocking agents. - Continue telemetry monitoring      2. COVID- 19 positive      3. Dementia      4. Anoxic brain injury     5. Bipolar 1 disorder     6. Multiple CVAs      7. Cocaine abuse     8. Depression      9. GERD     10. schizoaffective disorder     11. Hypertension      12. Hyperlipidemia   Pt admitted with  Bradycardia.  Noted documentation of ALTHEA on 6/3/20 in trauma H&P by trauma resident/consultant.  If possible, The medical record reflects the following:     Risk Factors: COVID, Bradycardia, hypotension     Clinical Indicators: Per 6/3 PN:\"Conditions: ALTHEA. .\", 6/3-Cr:1.1, GFR:52, 6/3-Cr:1.0, GFR:58, BUN:21, 6/4-Cr:0.8, BUN:25, GFR>60     Treatment: Lab monitoring, I&O     Blood culture 1/2 pos for GPC. ?  Contaminant; await final growth, consider repeating  PLAN:  icu monitoring  Ep eval/ID cs for +bld cx  Off bb+exelon  Tele when ok c pulm/crit/care

## 2020-06-06 LAB
ANION GAP SERPL CALCULATED.3IONS-SCNC: 14 MMOL/L (ref 7–16)
BASOPHILS ABSOLUTE: 0.05 E9/L (ref 0–0.2)
BASOPHILS RELATIVE PERCENT: 0.7 % (ref 0–2)
BUN BLDV-MCNC: 22 MG/DL (ref 6–20)
CALCIUM SERPL-MCNC: 8.8 MG/DL (ref 8.6–10.2)
CHLORIDE BLD-SCNC: 102 MMOL/L (ref 98–107)
CO2: 25 MMOL/L (ref 22–29)
CREAT SERPL-MCNC: 0.8 MG/DL (ref 0.5–1)
EOSINOPHILS ABSOLUTE: 0.41 E9/L (ref 0.05–0.5)
EOSINOPHILS RELATIVE PERCENT: 6 % (ref 0–6)
GFR AFRICAN AMERICAN: >60
GFR NON-AFRICAN AMERICAN: >60 ML/MIN/1.73
GLUCOSE BLD-MCNC: 95 MG/DL (ref 74–99)
HCT VFR BLD CALC: 34.9 % (ref 34–48)
HEMOGLOBIN: 11.3 G/DL (ref 11.5–15.5)
IMMATURE GRANULOCYTES #: 0.02 E9/L
IMMATURE GRANULOCYTES %: 0.3 % (ref 0–5)
LYMPHOCYTES ABSOLUTE: 2.78 E9/L (ref 1.5–4)
LYMPHOCYTES RELATIVE PERCENT: 40.7 % (ref 20–42)
MCH RBC QN AUTO: 32.9 PG (ref 26–35)
MCHC RBC AUTO-ENTMCNC: 32.4 % (ref 32–34.5)
MCV RBC AUTO: 101.7 FL (ref 80–99.9)
MONOCYTES ABSOLUTE: 0.64 E9/L (ref 0.1–0.95)
MONOCYTES RELATIVE PERCENT: 9.4 % (ref 2–12)
NEUTROPHILS ABSOLUTE: 2.93 E9/L (ref 1.8–7.3)
NEUTROPHILS RELATIVE PERCENT: 42.9 % (ref 43–80)
ORGANISM: ABNORMAL
PDW BLD-RTO: 12.7 FL (ref 11.5–15)
PLATELET # BLD: 161 E9/L (ref 130–450)
PMV BLD AUTO: 11.3 FL (ref 7–12)
POTASSIUM REFLEX MAGNESIUM: 4.4 MMOL/L (ref 3.5–5)
RBC # BLD: 3.43 E12/L (ref 3.5–5.5)
SODIUM BLD-SCNC: 141 MMOL/L (ref 132–146)
URINE CULTURE, ROUTINE: ABNORMAL
URINE CULTURE, ROUTINE: ABNORMAL
WBC # BLD: 6.8 E9/L (ref 4.5–11.5)

## 2020-06-06 PROCEDURE — 6370000000 HC RX 637 (ALT 250 FOR IP): Performed by: INTERNAL MEDICINE

## 2020-06-06 PROCEDURE — 6360000002 HC RX W HCPCS: Performed by: STUDENT IN AN ORGANIZED HEALTH CARE EDUCATION/TRAINING PROGRAM

## 2020-06-06 PROCEDURE — 80048 BASIC METABOLIC PNL TOTAL CA: CPT

## 2020-06-06 PROCEDURE — 06PY33Z REMOVAL OF INFUSION DEVICE FROM LOWER VEIN, PERCUTANEOUS APPROACH: ICD-10-PCS | Performed by: INTERNAL MEDICINE

## 2020-06-06 PROCEDURE — 36415 COLL VENOUS BLD VENIPUNCTURE: CPT

## 2020-06-06 PROCEDURE — 6370000000 HC RX 637 (ALT 250 FOR IP): Performed by: STUDENT IN AN ORGANIZED HEALTH CARE EDUCATION/TRAINING PROGRAM

## 2020-06-06 PROCEDURE — 99231 SBSQ HOSP IP/OBS SF/LOW 25: CPT | Performed by: INTERNAL MEDICINE

## 2020-06-06 PROCEDURE — 87040 BLOOD CULTURE FOR BACTERIA: CPT

## 2020-06-06 PROCEDURE — 2580000003 HC RX 258: Performed by: STUDENT IN AN ORGANIZED HEALTH CARE EDUCATION/TRAINING PROGRAM

## 2020-06-06 PROCEDURE — 87070 CULTURE OTHR SPECIMN AEROBIC: CPT

## 2020-06-06 PROCEDURE — 2140000000 HC CCU INTERMEDIATE R&B

## 2020-06-06 PROCEDURE — 87186 SC STD MICRODIL/AGAR DIL: CPT

## 2020-06-06 PROCEDURE — 85025 COMPLETE CBC W/AUTO DIFF WBC: CPT

## 2020-06-06 PROCEDURE — 87077 CULTURE AEROBIC IDENTIFY: CPT

## 2020-06-06 RX ORDER — SULFAMETHOXAZOLE AND TRIMETHOPRIM 800; 160 MG/1; MG/1
2 TABLET ORAL EVERY 12 HOURS SCHEDULED
Status: DISCONTINUED | OUTPATIENT
Start: 2020-06-06 | End: 2020-06-08

## 2020-06-06 RX ADMIN — Medication 10 ML: at 10:20

## 2020-06-06 RX ADMIN — ACETAMINOPHEN 650 MG: 325 TABLET, FILM COATED ORAL at 14:53

## 2020-06-06 RX ADMIN — GABAPENTIN 100 MG: 100 CAPSULE ORAL at 14:53

## 2020-06-06 RX ADMIN — DOCUSATE SODIUM 100 MG: 100 CAPSULE, LIQUID FILLED ORAL at 09:13

## 2020-06-06 RX ADMIN — SULFAMETHOXAZOLE AND TRIMETHOPRIM 2 TABLET: 800; 160 TABLET ORAL at 23:57

## 2020-06-06 RX ADMIN — MIRTAZAPINE 7.5 MG: 15 TABLET, FILM COATED ORAL at 21:09

## 2020-06-06 RX ADMIN — Medication 10 ML: at 21:10

## 2020-06-06 RX ADMIN — GABAPENTIN 100 MG: 100 CAPSULE ORAL at 21:09

## 2020-06-06 RX ADMIN — DOCUSATE SODIUM 100 MG: 100 CAPSULE, LIQUID FILLED ORAL at 21:09

## 2020-06-06 RX ADMIN — ENOXAPARIN SODIUM 40 MG: 40 INJECTION SUBCUTANEOUS at 21:10

## 2020-06-06 RX ADMIN — FOLIC ACID 1 MG: 1 TABLET ORAL at 09:13

## 2020-06-06 RX ADMIN — DIVALPROEX SODIUM 500 MG: 500 TABLET, EXTENDED RELEASE ORAL at 09:13

## 2020-06-06 RX ADMIN — GABAPENTIN 100 MG: 100 CAPSULE ORAL at 09:13

## 2020-06-06 RX ADMIN — ACETAMINOPHEN 650 MG: 325 TABLET, FILM COATED ORAL at 21:10

## 2020-06-06 RX ADMIN — ACETAMINOPHEN 650 MG: 325 TABLET, FILM COATED ORAL at 09:20

## 2020-06-06 RX ADMIN — ACETAMINOPHEN 650 MG: 325 TABLET, FILM COATED ORAL at 04:37

## 2020-06-06 RX ADMIN — SERTRALINE 50 MG: 50 TABLET, FILM COATED ORAL at 21:09

## 2020-06-06 RX ADMIN — ENOXAPARIN SODIUM 40 MG: 40 INJECTION SUBCUTANEOUS at 09:13

## 2020-06-06 RX ADMIN — PANTOPRAZOLE SODIUM 40 MG: 40 TABLET, DELAYED RELEASE ORAL at 05:41

## 2020-06-06 RX ADMIN — FERROUS SULFATE TAB 325 MG (65 MG ELEMENTAL FE) 325 MG: 325 (65 FE) TAB at 09:13

## 2020-06-06 RX ADMIN — STANDARDIZED SENNA CONCENTRATE 8.6 MG: 8.6 TABLET ORAL at 22:50

## 2020-06-06 RX ADMIN — LORAZEPAM 1 MG: 1 TABLET ORAL at 21:33

## 2020-06-06 ASSESSMENT — PAIN DESCRIPTION - ORIENTATION
ORIENTATION: LEFT;RIGHT
ORIENTATION: RIGHT;LEFT

## 2020-06-06 ASSESSMENT — PAIN DESCRIPTION - LOCATION
LOCATION: FOOT
LOCATION: FOOT

## 2020-06-06 ASSESSMENT — PAIN DESCRIPTION - DESCRIPTORS
DESCRIPTORS: ACHING;SHARP;DISCOMFORT
DESCRIPTORS: ACHING;SHARP

## 2020-06-06 ASSESSMENT — PAIN SCALES - GENERAL
PAINLEVEL_OUTOF10: 9
PAINLEVEL_OUTOF10: 2
PAINLEVEL_OUTOF10: 7
PAINLEVEL_OUTOF10: 5
PAINLEVEL_OUTOF10: 6
PAINLEVEL_OUTOF10: 10
PAINLEVEL_OUTOF10: 5

## 2020-06-06 ASSESSMENT — PAIN DESCRIPTION - PAIN TYPE
TYPE: CHRONIC PAIN
TYPE: CHRONIC PAIN

## 2020-06-06 ASSESSMENT — PAIN DESCRIPTION - PROGRESSION
CLINICAL_PROGRESSION: NOT CHANGED
CLINICAL_PROGRESSION: NOT CHANGED

## 2020-06-06 ASSESSMENT — PAIN DESCRIPTION - ONSET
ONSET: ON-GOING
ONSET: ON-GOING

## 2020-06-06 ASSESSMENT — PAIN DESCRIPTION - FREQUENCY
FREQUENCY: CONTINUOUS
FREQUENCY: CONTINUOUS

## 2020-06-06 NOTE — PROGRESS NOTES
Benign to palpation. Some abdominal tenderness   Extremities: No clubbing, no cyanosis, no edema.   Has a femoral TLC    Laboratory and Tests Review:  Lab Results   Component Value Date    WBC 6.8 06/06/2020    WBC 6.5 06/05/2020    WBC 9.3 06/04/2020    HGB 11.3 (L) 06/06/2020    HCT 34.9 06/06/2020    .7 (H) 06/06/2020     06/06/2020     Lab Results   Component Value Date    NEUTROABS 2.93 06/06/2020    NEUTROABS 2.70 06/05/2020    NEUTROABS 6.11 06/04/2020     No results found for: CRP  No results found for: SEDRATE  Lab Results   Component Value Date    ALT 20 06/03/2020    AST 28 06/03/2020    ALKPHOS 44 06/03/2020    BILITOT 0.2 06/03/2020     Lab Results   Component Value Date     06/06/2020    K 4.4 06/06/2020     06/06/2020    CO2 25 06/06/2020    BUN 22 06/06/2020    CREATININE 0.8 06/06/2020    GFRAA >60 06/06/2020    LABGLOM >60 06/06/2020    GLUCOSE 95 06/06/2020    PROT 6.7 06/03/2020    LABALBU 4.1 06/03/2020    CALCIUM 8.8 06/06/2020    BILITOT 0.2 06/03/2020    ALKPHOS 44 06/03/2020    AST 28 06/03/2020    ALT 20 06/03/2020     Radiology:  Reviewed     Microbiology:   6/4/2020- blood cx- staphylococcus species, GNR  6/4/2020- urine cx- proteus mirabilis > 100,00- and < 10,000 mixed gram positive organisms     ASSESSMENT:  · Bacteremia - staph species, GNR  · UTI- proteus mirabilis   · COVID    PLAN:  · Continue rocephin   · Check cultures  · Monitor labs  · Check bladder scan  · CT canceled dut to COVID status   · Check repeat blood cultures   · Has TLC to right groin - will remove and place PIV for now-   · Send tip for culture-   · If patient spikes temp may need to cover for GPC- staph species in the blood    Tena Aguilar   1:45 PM  6/6/2020

## 2020-06-06 NOTE — PLAN OF CARE
Problem: Falls - Risk of:  Goal: Will remain free from falls  Description: Will remain free from falls  Outcome: Met This Shift  Goal: Absence of physical injury  Description: Absence of physical injury  Outcome: Met This Shift     Problem: Suicide risk  Goal: Provide patient with safe environment  Description: Provide patient with safe environment  Outcome: Met This Shift

## 2020-06-07 PROCEDURE — 6370000000 HC RX 637 (ALT 250 FOR IP): Performed by: STUDENT IN AN ORGANIZED HEALTH CARE EDUCATION/TRAINING PROGRAM

## 2020-06-07 PROCEDURE — 6360000002 HC RX W HCPCS: Performed by: STUDENT IN AN ORGANIZED HEALTH CARE EDUCATION/TRAINING PROGRAM

## 2020-06-07 PROCEDURE — 36415 COLL VENOUS BLD VENIPUNCTURE: CPT

## 2020-06-07 PROCEDURE — 6370000000 HC RX 637 (ALT 250 FOR IP): Performed by: INTERNAL MEDICINE

## 2020-06-07 PROCEDURE — 2580000003 HC RX 258: Performed by: STUDENT IN AN ORGANIZED HEALTH CARE EDUCATION/TRAINING PROGRAM

## 2020-06-07 PROCEDURE — 99231 SBSQ HOSP IP/OBS SF/LOW 25: CPT | Performed by: INTERNAL MEDICINE

## 2020-06-07 PROCEDURE — 87040 BLOOD CULTURE FOR BACTERIA: CPT

## 2020-06-07 PROCEDURE — 2140000000 HC CCU INTERMEDIATE R&B

## 2020-06-07 RX ADMIN — ACETAMINOPHEN 650 MG: 325 TABLET, FILM COATED ORAL at 14:33

## 2020-06-07 RX ADMIN — GABAPENTIN 100 MG: 100 CAPSULE ORAL at 14:33

## 2020-06-07 RX ADMIN — SULFAMETHOXAZOLE AND TRIMETHOPRIM 2 TABLET: 800; 160 TABLET ORAL at 09:40

## 2020-06-07 RX ADMIN — ENOXAPARIN SODIUM 40 MG: 40 INJECTION SUBCUTANEOUS at 21:11

## 2020-06-07 RX ADMIN — DIVALPROEX SODIUM 500 MG: 500 TABLET, EXTENDED RELEASE ORAL at 09:40

## 2020-06-07 RX ADMIN — PANTOPRAZOLE SODIUM 40 MG: 40 TABLET, DELAYED RELEASE ORAL at 06:27

## 2020-06-07 RX ADMIN — ACETAMINOPHEN 650 MG: 325 TABLET, FILM COATED ORAL at 21:10

## 2020-06-07 RX ADMIN — GABAPENTIN 100 MG: 100 CAPSULE ORAL at 21:09

## 2020-06-07 RX ADMIN — ENOXAPARIN SODIUM 40 MG: 40 INJECTION SUBCUTANEOUS at 09:40

## 2020-06-07 RX ADMIN — GABAPENTIN 100 MG: 100 CAPSULE ORAL at 09:40

## 2020-06-07 RX ADMIN — FERROUS SULFATE TAB 325 MG (65 MG ELEMENTAL FE) 325 MG: 325 (65 FE) TAB at 09:40

## 2020-06-07 RX ADMIN — ACETAMINOPHEN 650 MG: 325 TABLET, FILM COATED ORAL at 05:42

## 2020-06-07 RX ADMIN — DOCUSATE SODIUM 100 MG: 100 CAPSULE, LIQUID FILLED ORAL at 21:11

## 2020-06-07 RX ADMIN — STANDARDIZED SENNA CONCENTRATE 8.6 MG: 8.6 TABLET ORAL at 21:10

## 2020-06-07 RX ADMIN — Medication 10 ML: at 21:00

## 2020-06-07 RX ADMIN — SERTRALINE 50 MG: 50 TABLET, FILM COATED ORAL at 21:00

## 2020-06-07 RX ADMIN — ACETAMINOPHEN 650 MG: 325 TABLET, FILM COATED ORAL at 09:41

## 2020-06-07 RX ADMIN — DOCUSATE SODIUM 100 MG: 100 CAPSULE, LIQUID FILLED ORAL at 09:40

## 2020-06-07 RX ADMIN — FOLIC ACID 1 MG: 1 TABLET ORAL at 09:40

## 2020-06-07 RX ADMIN — LORAZEPAM 1 MG: 1 TABLET ORAL at 21:09

## 2020-06-07 ASSESSMENT — PAIN SCALES - GENERAL
PAINLEVEL_OUTOF10: 7
PAINLEVEL_OUTOF10: 3
PAINLEVEL_OUTOF10: 5
PAINLEVEL_OUTOF10: 2
PAINLEVEL_OUTOF10: 8
PAINLEVEL_OUTOF10: 4
PAINLEVEL_OUTOF10: 3
PAINLEVEL_OUTOF10: 10
PAINLEVEL_OUTOF10: 6
PAINLEVEL_OUTOF10: 5

## 2020-06-07 ASSESSMENT — PAIN DESCRIPTION - FREQUENCY
FREQUENCY: CONTINUOUS
FREQUENCY: INTERMITTENT
FREQUENCY: CONTINUOUS

## 2020-06-07 ASSESSMENT — PAIN DESCRIPTION - PROGRESSION
CLINICAL_PROGRESSION: NOT CHANGED

## 2020-06-07 ASSESSMENT — PAIN DESCRIPTION - ORIENTATION
ORIENTATION: RIGHT;LEFT

## 2020-06-07 ASSESSMENT — PAIN DESCRIPTION - ONSET
ONSET: ON-GOING

## 2020-06-07 ASSESSMENT — PAIN DESCRIPTION - LOCATION
LOCATION: FOOT
LOCATION: FOOT
LOCATION: HEAD;FOOT

## 2020-06-07 ASSESSMENT — PAIN DESCRIPTION - PAIN TYPE
TYPE: CHRONIC PAIN

## 2020-06-07 ASSESSMENT — PAIN DESCRIPTION - DESCRIPTORS
DESCRIPTORS: ACHING;STABBING
DESCRIPTORS: STABBING
DESCRIPTORS: HEADACHE;ACHING;SHARP

## 2020-06-07 ASSESSMENT — PAIN - FUNCTIONAL ASSESSMENT
PAIN_FUNCTIONAL_ASSESSMENT: PREVENTS OR INTERFERES WITH MANY ACTIVE NOT PASSIVE ACTIVITIES
PAIN_FUNCTIONAL_ASSESSMENT: PREVENTS OR INTERFERES SOME ACTIVE ACTIVITIES AND ADLS
PAIN_FUNCTIONAL_ASSESSMENT: PREVENTS OR INTERFERES SOME ACTIVE ACTIVITIES AND ADLS

## 2020-06-07 NOTE — PROGRESS NOTES
[address] [place] [address] [facility]  Progress Note  CC: feeling a little better today  Face to face encounter   SUBJECTIVE:  Patient is tolerating medications. No reported adverse drug reactions. ROS: No nausea, vomiting, diarrhea. Patient is feeling a little better- wants to get washed up today  Pulled her IV out   No shortness of breath. No fevers. On room air. Medications:  Scheduled Meds:   sulfamethoxazole-trimethoprim  2 tablet Oral 2 times per day    docusate sodium  100 mg Oral BID    senna  1 tablet Oral Nightly    enoxaparin  40 mg Subcutaneous BID    sodium chloride flush  10 mL Intravenous 2 times per day    divalproex  500 mg Oral Daily    mirtazapine  7.5 mg Oral Nightly    sertraline  50 mg Oral Nightly    gabapentin  100 mg Oral TID    folic acid  1 mg Oral Daily    pantoprazole  40 mg Oral QAM AC    ferrous sulfate  325 mg Oral Daily with breakfast     Continuous Infusions:  PRN Meds:perflutren lipid microspheres, sodium chloride flush, acetaminophen, polyethylene glycol, LORazepam  OBJECTIVE:  Patient Vitals for the past 24 hrs:   BP Temp Temp src Pulse Resp SpO2   06/07/20 0934 (!) 118/58 97.8 °F (36.6 °C) Infrared 80 18 97 %   06/07/20 0400 110/68 98.4 °F (36.9 °C) Axillary 88 18 --   06/06/20 2245 110/74 99 °F (37.2 °C) Oral 84 18 --   06/06/20 1523 120/68 97.9 °F (36.6 °C) Infrared 61 16 97 %     Constitutional: The patient is awake, alert, walking in room   Skin: Warm and dry. No rashes were noted. Head: Eyes show round, and reactive pupils. No jaundice. Mouth: Moist mucous membranes, no ulcerations, no thrush. Neck: Supple to movements. No lymphadenopathy. Chest: No use of accessory muscles to breathe. Symmetrical expansion. Auscultation reveals no wheezing, crackles, or rhonchi. On room air. Cardiovascular: S1 and S2 are rhythmic and regular. No murmurs appreciated. Abdomen: Positive bowel sounds to auscultation. Benign to palpation.  Some abdominal tenderness   Extremities: No clubbing, no cyanosis, no edema. No IV access.      Laboratory and Tests Review:  Lab Results   Component Value Date    WBC 6.8 06/06/2020    WBC 6.5 06/05/2020    WBC 9.3 06/04/2020    HGB 11.3 (L) 06/06/2020    HCT 34.9 06/06/2020    .7 (H) 06/06/2020     06/06/2020     Lab Results   Component Value Date    NEUTROABS 2.93 06/06/2020    NEUTROABS 2.70 06/05/2020    NEUTROABS 6.11 06/04/2020     No results found for: CRP  No results found for: SEDRATE  Lab Results   Component Value Date    ALT 20 06/03/2020    AST 28 06/03/2020    ALKPHOS 44 06/03/2020    BILITOT 0.2 06/03/2020     Lab Results   Component Value Date     06/06/2020    K 4.4 06/06/2020     06/06/2020    CO2 25 06/06/2020    BUN 22 06/06/2020    CREATININE 0.8 06/06/2020    GFRAA >60 06/06/2020    LABGLOM >60 06/06/2020    GLUCOSE 95 06/06/2020    PROT 6.7 06/03/2020    LABALBU 4.1 06/03/2020    CALCIUM 8.8 06/06/2020    BILITOT 0.2 06/03/2020    ALKPHOS 44 06/03/2020    AST 28 06/03/2020    ALT 20 06/03/2020     Radiology:  Reviewed     Microbiology:   6/6/2020- TLC tip - ++ CONS < 15 colonies   6/4/2020- blood cx- staphylococcus species, GNR  6/4/2020- urine cx- proteus mirabilis > 100,00- and < 10,000 mixed gram positive organisms     6/6/2020- repeat blood cultures pending     ASSESSMENT:  · Bacteremia - staph species, proteus  · Complicated UTI- proteus mirabilis   · COVID    PLAN:  · Was changed to bactrim PO  · Watch creat-  · Patient has no IV access- pulled out her IV  · Check cultures  · Monitor labs  · bladder scan- about 17 cc  · CT canceled dut to COVID status   · Check repeat blood cultures -pending     Jose A Johnson   1:08 PM  6/7/2020

## 2020-06-07 NOTE — PROGRESS NOTES
approx 2100 Dr. Molina Simpler called to see if pt had I.V (No)\" okay, let me get back with you after I find out who is on call for infection control I would like to change her antibiotics\" Remind doctor she does not have a I.V. and she is a hard stick.

## 2020-06-08 LAB
ALBUMIN SERPL-MCNC: 4.6 G/DL (ref 3.5–5.2)
ALP BLD-CCNC: 49 U/L (ref 35–104)
ALT SERPL-CCNC: 37 U/L (ref 0–32)
ANION GAP SERPL CALCULATED.3IONS-SCNC: 13 MMOL/L (ref 7–16)
AST SERPL-CCNC: 61 U/L (ref 0–31)
BASOPHILS ABSOLUTE: 0.05 E9/L (ref 0–0.2)
BASOPHILS ABSOLUTE: 0.06 E9/L (ref 0–0.2)
BASOPHILS RELATIVE PERCENT: 0.8 % (ref 0–2)
BASOPHILS RELATIVE PERCENT: 1 % (ref 0–2)
BILIRUB SERPL-MCNC: <0.2 MG/DL (ref 0–1.2)
BLOOD CULTURE, ROUTINE: ABNORMAL
BUN BLDV-MCNC: 20 MG/DL (ref 6–20)
CALCIUM SERPL-MCNC: 9.2 MG/DL (ref 8.6–10.2)
CHLORIDE BLD-SCNC: 104 MMOL/L (ref 98–107)
CO2: 25 MMOL/L (ref 22–29)
CREAT SERPL-MCNC: 1.2 MG/DL (ref 0.5–1)
CULTURE CATHETER TIP: ABNORMAL
EOSINOPHILS ABSOLUTE: 0.43 E9/L (ref 0.05–0.5)
EOSINOPHILS ABSOLUTE: 0.45 E9/L (ref 0.05–0.5)
EOSINOPHILS RELATIVE PERCENT: 7.1 % (ref 0–6)
EOSINOPHILS RELATIVE PERCENT: 7.2 % (ref 0–6)
GFR AFRICAN AMERICAN: 57
GFR NON-AFRICAN AMERICAN: 47 ML/MIN/1.73
GLUCOSE BLD-MCNC: 84 MG/DL (ref 74–99)
HCT VFR BLD CALC: 40.3 % (ref 34–48)
HCT VFR BLD CALC: 40.4 % (ref 34–48)
HEMOGLOBIN: 13 G/DL (ref 11.5–15.5)
HEMOGLOBIN: 13.1 G/DL (ref 11.5–15.5)
IMMATURE GRANULOCYTES #: 0.01 E9/L
IMMATURE GRANULOCYTES #: 0.02 E9/L
IMMATURE GRANULOCYTES %: 0.2 % (ref 0–5)
IMMATURE GRANULOCYTES %: 0.3 % (ref 0–5)
LYMPHOCYTES ABSOLUTE: 2.63 E9/L (ref 1.5–4)
LYMPHOCYTES ABSOLUTE: 2.67 E9/L (ref 1.5–4)
LYMPHOCYTES RELATIVE PERCENT: 42.3 % (ref 20–42)
LYMPHOCYTES RELATIVE PERCENT: 44 % (ref 20–42)
MCH RBC QN AUTO: 32.9 PG (ref 26–35)
MCH RBC QN AUTO: 33.5 PG (ref 26–35)
MCHC RBC AUTO-ENTMCNC: 32.2 % (ref 32–34.5)
MCHC RBC AUTO-ENTMCNC: 32.5 % (ref 32–34.5)
MCV RBC AUTO: 102.3 FL (ref 80–99.9)
MCV RBC AUTO: 103.1 FL (ref 80–99.9)
MONOCYTES ABSOLUTE: 0.61 E9/L (ref 0.1–0.95)
MONOCYTES ABSOLUTE: 0.69 E9/L (ref 0.1–0.95)
MONOCYTES RELATIVE PERCENT: 10 % (ref 2–12)
MONOCYTES RELATIVE PERCENT: 11.1 % (ref 2–12)
NEUTROPHILS ABSOLUTE: 2.3 E9/L (ref 1.8–7.3)
NEUTROPHILS ABSOLUTE: 2.37 E9/L (ref 1.8–7.3)
NEUTROPHILS RELATIVE PERCENT: 37.9 % (ref 43–80)
NEUTROPHILS RELATIVE PERCENT: 38.1 % (ref 43–80)
ORGANISM: ABNORMAL
PDW BLD-RTO: 12.8 FL (ref 11.5–15)
PDW BLD-RTO: 12.9 FL (ref 11.5–15)
PLATELET # BLD: 188 E9/L (ref 130–450)
PLATELET # BLD: 192 E9/L (ref 130–450)
PMV BLD AUTO: 11.4 FL (ref 7–12)
PMV BLD AUTO: 11.4 FL (ref 7–12)
POTASSIUM REFLEX MAGNESIUM: 5.7 MMOL/L (ref 3.5–5)
POTASSIUM SERPL-SCNC: 5.7 MMOL/L (ref 3.5–5)
RBC # BLD: 3.91 E12/L (ref 3.5–5.5)
RBC # BLD: 3.95 E12/L (ref 3.5–5.5)
SARS-COV-2, NAAT: DETECTED
SODIUM BLD-SCNC: 142 MMOL/L (ref 132–146)
TOTAL PROTEIN: 7.8 G/DL (ref 6.4–8.3)
WBC # BLD: 6.1 E9/L (ref 4.5–11.5)
WBC # BLD: 6.2 E9/L (ref 4.5–11.5)

## 2020-06-08 PROCEDURE — U0002 COVID-19 LAB TEST NON-CDC: HCPCS

## 2020-06-08 PROCEDURE — 6370000000 HC RX 637 (ALT 250 FOR IP): Performed by: STUDENT IN AN ORGANIZED HEALTH CARE EDUCATION/TRAINING PROGRAM

## 2020-06-08 PROCEDURE — 6370000000 HC RX 637 (ALT 250 FOR IP): Performed by: INTERNAL MEDICINE

## 2020-06-08 PROCEDURE — 36415 COLL VENOUS BLD VENIPUNCTURE: CPT

## 2020-06-08 PROCEDURE — 2140000000 HC CCU INTERMEDIATE R&B

## 2020-06-08 PROCEDURE — 85025 COMPLETE CBC W/AUTO DIFF WBC: CPT

## 2020-06-08 PROCEDURE — 97530 THERAPEUTIC ACTIVITIES: CPT

## 2020-06-08 PROCEDURE — 6360000002 HC RX W HCPCS: Performed by: STUDENT IN AN ORGANIZED HEALTH CARE EDUCATION/TRAINING PROGRAM

## 2020-06-08 PROCEDURE — 97535 SELF CARE MNGMENT TRAINING: CPT

## 2020-06-08 PROCEDURE — 80048 BASIC METABOLIC PNL TOTAL CA: CPT

## 2020-06-08 PROCEDURE — 87040 BLOOD CULTURE FOR BACTERIA: CPT

## 2020-06-08 PROCEDURE — 80053 COMPREHEN METABOLIC PANEL: CPT

## 2020-06-08 PROCEDURE — 2580000003 HC RX 258: Performed by: STUDENT IN AN ORGANIZED HEALTH CARE EDUCATION/TRAINING PROGRAM

## 2020-06-08 RX ORDER — LEVOFLOXACIN 750 MG/1
750 TABLET ORAL DAILY
Status: DISCONTINUED | OUTPATIENT
Start: 2020-06-08 | End: 2020-06-16 | Stop reason: HOSPADM

## 2020-06-08 RX ADMIN — GABAPENTIN 100 MG: 100 CAPSULE ORAL at 19:58

## 2020-06-08 RX ADMIN — LEVOFLOXACIN 750 MG: 750 TABLET, FILM COATED ORAL at 16:50

## 2020-06-08 RX ADMIN — Medication 10 ML: at 10:23

## 2020-06-08 RX ADMIN — DOCUSATE SODIUM 100 MG: 100 CAPSULE, LIQUID FILLED ORAL at 19:58

## 2020-06-08 RX ADMIN — FOLIC ACID 1 MG: 1 TABLET ORAL at 10:23

## 2020-06-08 RX ADMIN — GABAPENTIN 100 MG: 100 CAPSULE ORAL at 10:23

## 2020-06-08 RX ADMIN — ENOXAPARIN SODIUM 40 MG: 40 INJECTION SUBCUTANEOUS at 19:58

## 2020-06-08 RX ADMIN — ACETAMINOPHEN 650 MG: 325 TABLET, FILM COATED ORAL at 18:42

## 2020-06-08 RX ADMIN — DIVALPROEX SODIUM 500 MG: 500 TABLET, EXTENDED RELEASE ORAL at 10:22

## 2020-06-08 RX ADMIN — MIRTAZAPINE 7.5 MG: 15 TABLET, FILM COATED ORAL at 19:57

## 2020-06-08 RX ADMIN — LORAZEPAM 1 MG: 1 TABLET ORAL at 21:47

## 2020-06-08 RX ADMIN — Medication 10 ML: at 19:58

## 2020-06-08 RX ADMIN — DOCUSATE SODIUM 100 MG: 100 CAPSULE, LIQUID FILLED ORAL at 10:23

## 2020-06-08 RX ADMIN — STANDARDIZED SENNA CONCENTRATE 8.6 MG: 8.6 TABLET ORAL at 19:57

## 2020-06-08 RX ADMIN — FERROUS SULFATE TAB 325 MG (65 MG ELEMENTAL FE) 325 MG: 325 (65 FE) TAB at 10:22

## 2020-06-08 RX ADMIN — ENOXAPARIN SODIUM 40 MG: 40 INJECTION SUBCUTANEOUS at 10:21

## 2020-06-08 RX ADMIN — SULFAMETHOXAZOLE AND TRIMETHOPRIM 2 TABLET: 800; 160 TABLET ORAL at 10:22

## 2020-06-08 RX ADMIN — SULFAMETHOXAZOLE AND TRIMETHOPRIM 2 TABLET: 800; 160 TABLET ORAL at 01:40

## 2020-06-08 RX ADMIN — SERTRALINE 50 MG: 50 TABLET, FILM COATED ORAL at 19:58

## 2020-06-08 RX ADMIN — GABAPENTIN 100 MG: 100 CAPSULE ORAL at 14:51

## 2020-06-08 RX ADMIN — ACETAMINOPHEN 650 MG: 325 TABLET, FILM COATED ORAL at 14:51

## 2020-06-08 RX ADMIN — PANTOPRAZOLE SODIUM 40 MG: 40 TABLET, DELAYED RELEASE ORAL at 06:04

## 2020-06-08 RX ADMIN — ACETAMINOPHEN 650 MG: 325 TABLET, FILM COATED ORAL at 10:23

## 2020-06-08 ASSESSMENT — PAIN DESCRIPTION - DESCRIPTORS: DESCRIPTORS: BURNING

## 2020-06-08 ASSESSMENT — PAIN DESCRIPTION - FREQUENCY: FREQUENCY: CONTINUOUS

## 2020-06-08 ASSESSMENT — PAIN SCALES - GENERAL
PAINLEVEL_OUTOF10: 6
PAINLEVEL_OUTOF10: 3
PAINLEVEL_OUTOF10: 3
PAINLEVEL_OUTOF10: 7
PAINLEVEL_OUTOF10: 0
PAINLEVEL_OUTOF10: 10
PAINLEVEL_OUTOF10: 6
PAINLEVEL_OUTOF10: 3

## 2020-06-08 ASSESSMENT — PAIN DESCRIPTION - ORIENTATION: ORIENTATION: RIGHT;LEFT

## 2020-06-08 ASSESSMENT — PAIN DESCRIPTION - ONSET: ONSET: ON-GOING

## 2020-06-08 ASSESSMENT — PAIN DESCRIPTION - LOCATION
LOCATION: FOOT
LOCATION: OTHER (COMMENT)

## 2020-06-08 ASSESSMENT — PAIN DESCRIPTION - PAIN TYPE
TYPE: ACUTE PAIN
TYPE: CHRONIC PAIN

## 2020-06-08 ASSESSMENT — PAIN DESCRIPTION - PROGRESSION: CLINICAL_PROGRESSION: NOT CHANGED

## 2020-06-08 NOTE — PLAN OF CARE
Problem: Falls - Risk of:  Goal: Will remain free from falls  Description: Will remain free from falls  Outcome: Ongoing  Goal: Absence of physical injury  Description: Absence of physical injury  Outcome: Ongoing     Problem: Pain:  Goal: Pain level will decrease  Description: Pain level will decrease  Outcome: Ongoing  Goal: Control of acute pain  Description: Control of acute pain  Outcome: Ongoing  Goal: Control of chronic pain  Description: Control of chronic pain  Outcome: Ongoing     Problem: Suicide risk  Goal: Provide patient with safe environment  Description: Provide patient with safe environment  Outcome: Ongoing

## 2020-06-08 NOTE — PROGRESS NOTES
Lab Results   Component Value Date    PROTIME 11.2 2020    INR 1.0 2020     Last 3 Troponin:    Lab Results   Component Value Date    TROPONINI <0.01 2020     ABG:    Lab Results   Component Value Date    PH 7.404 2020    PCO2 35.2 2020    PO2 103.5 2020    HCO3 21.5 2020    BE -2.6 2020    O2SAT 97.9 2020     IRON:  No results found for: IRON    IMAGING    Xr Pelvis (1-2 Views)    Result Date: 6/3/2020  Patient MRN:  [id number] : 1970 Age: 44 years Gender: Female Order Date:  6/3/2020 1:15 PM EXAM: XR PELVIS (1-2 VIEWS) NUMBER OF IMAGES:   1  INDICATION:  TRAUMA TRAUMA COMPARISON: Britta 3, 2020 TECHNIQUE: AP view of the pelvis. FINDINGS: No fracture or dislocation seen. Bone mineralization is normal for age. Nonobstructed bowel gas pattern. Multiple pelvic phleboliths. No acute osseous abnormality seen. Ct Head Wo Contrast    Result Date: 6/3/2020  Patient MRN:  [id number] : 1970 Age: 44 years Gender: Female Order Date:  6/3/2020 1:00 PM EXAM: CT HEAD WO CONTRAST INDICATION:  Trauma Trauma  COMPARISON: None FINDINGS: PARENCHYMA:No mass effect, edema or hemorrhage. Moderate cerebral volume loss is seen with mild chronic microvascular ischemic changes. There is asymmetric enlargement of the left occipital horn with volume loss in the left occipital lobe. VENTRICLES: No evidence of hydrocephalus. Ex vacuo dilation of the left occipital horn. CALVARIUM:The calvarium is intact without fracture or focal lesion. SINUSES: Mild mucosal thickening is seen in the paranasal sinuses, especially the ethmoid and maxillary sinuses. Small air-fluid level in the left maxillary sinus. The mastoids are clear. 1. No skull fracture or acute intracranial abnormality. 2. Moderate volume loss in the cerebrum, the extent of which is greater than is typical for age. 3. Volume loss in the left occipital lobe, with ex vacuo dilation of the occipital horn.  Findings

## 2020-06-08 NOTE — CARE COORDINATION
SOCIAL WORK/DISCHARGE PLANNING;  Care coordination update. Spoke with pt via phone in room. Plan remains for return to [facility] when medically ready. I called [facility] and spoke with Mela(she's not sure who is handling admissions). Pt is a bed hold there. Will follow to facilitate return when medically ready. Per CM note pt can return without repeat co-vid testing. Envelope per notes in chart.   Princess Mena [facility]  [phone]

## 2020-06-08 NOTE — PROGRESS NOTES
noted above. PLAN:    Patient is making fair progress towards established goals. Will continue with current POC.       Time in: 1415  Time out: 1440    Total Treatment Time 25 minutes     CPT codes:  [] Gait training 44419 0 minutes  [] Manual therapy 02759 0 minutes  [x] Therapeutic activities 89357 25 minutes  [] Therapeutic exercises 54365 0 minutes  [] Neuromuscular reeducation 85803 0 minutes      Adonay Stoner PT, DPT   [id number]

## 2020-06-08 NOTE — PROGRESS NOTES
Pulmonary [facility]                             Pulmonary Consult/Progress Note :    CC: f/u sx bradycardia  denies fever or chills   No SOB     Subjective   SOB has resolved  No events iver night  Complain of leg pain   No chest pain   No fevert     Objective     Vitals:    06/07/20 1632   BP: 124/72   Pulse: 86   Resp: 18   Temp: 97.7 °F (36.5 °C)   SpO2: 97%       I & O - 24hr:    Intake/Output Summary (Last 24 hours) at 6/7/2020 2341  Last data filed at 6/7/2020 2218  Gross per 24 hour   Intake 630 ml   Output 550 ml   Net 80 ml     I/O last 3 completed shifts: In: 56 [P.O.:630]  Out: 550 [Urine:550] No intake/output data recorded. Weight change:     Physical Exam:    Vitals:    06/07/20 1632   BP: 124/72   Pulse: 86   Resp: 18   Temp: 97.7 °F (36.5 °C)   SpO2: 97%     CVS : S1 .S2   Chest :No rhonchi  Abdomen soft  Neuro: strength 5/5               Labs and Imaging Studies     CBC:   Recent Labs     06/05/20  0555 06/06/20  0440   WBC 6.5 6.8   HGB 11.4* 11.3*   HCT 35.3 34.9   .0* 101.7*    161       BMP:    Recent Labs     06/05/20  0555 06/06/20  0440   * 141   K 4.2 4.4   * 102   CO2 31* 25   BUN 18 22*   CREATININE 0.8 0.8   GLUCOSE 87 95       LIVER PROFILE:   No results for input(s): AST, ALT, LIPASE, BILIDIR, BILITOT, ALKPHOS in the last 72 hours. Invalid input(s): AMYLASE,  ALB    PT/INR:   No results for input(s): PROTIME, INR in the last 72 hours. APTT:   No results for input(s): APTT in the last 72 hours.     Fasting Lipid Panel:    No results found for: CHOL, TRIG, HDL    Cardiac Enzymes:    Lab Results   Component Value Date    TROPONINI <0.01 06/03/2020      Assessment and Plan           History of COVID-19 infection  Same Plan  Tested positive on initial admission  Negative twice on 21st and 22nd  Positive on retest on 26th  Continue droplet isolation for now  No signs of pneumonia Just monitor     Sinus bradycardia:  Resolved         Kaushal Morton MD,FCCP  Pulmonary&Critical Care Medicine   Director of [address] [place] [address] Director of [address] [place] [address]    John Newman

## 2020-06-08 NOTE — PROGRESS NOTES
[facility] PROGRESS NOTE      Chief complaint: Follow-up of CoNS and Proteus mirabilis CLABSI    The patient is a 44 y.o. female with history of stroke, bipolar disorder, hypertension, hyperlipidemia, on nitrofurantoin at the nursing home, presented on 06/03 after fall from syncope, found to be bradycardic. She is known to have tested positive for COVID-19. On admission, she was afebrile and hemodynamically stable with no leukocytosis. Urinalysis showed insignificant pyuria with urine culture growing more than 100,000 CFU per mL gram-negative rods. Blood cultures showed Staphylococcus capitis and warneri (susceptible to methicillin, cotrimoxazole and levofloxacin) and Proteus mirabilis (non-ESBL; susceptible to cotrimoxazole and levofloxacin). Urine culture showed <10,000 CFU/mL of mixed Gram-positive organisms and >100,000 CFU/mL of Proteus mirabilis. Right femoral TLC was removed on 06/06. Catheter tip culture grew <15 colonies of CoNS. Blood cultures from 06/06 still showed Gram-positive cocci in clusters. Ceftriaxone was started on 06/05. She has been pulling out her IV lines. Ceftriaxone was switched to cotrimoxazole on 06/06. Subjective: Patient was seen and examined. No chills, no abdominal pain, no diarrhea, no rash, no itching. Objective:    Vitals:    06/08/20 1031   BP: 118/82   Pulse: 87   Resp: 20   Temp: 97.6 °F (36.4 °C)   SpO2: 98%     Constitutional: Alert, not in distress  Respiratory: Clear breath sounds, no crackles, no wheezes  Cardiovascular: Regular rate and rhythm, no murmurs  Gastrointestinal: Bowel sounds present, soft, nontender  Skin: Warm and dry, no active dermatoses  Musculoskeletal: No joint swelling, no joint erythema    Labs, imaging, and medical records/notes were personally reviewed.     Assessment:  Staphylococcus capitis and warneri (susceptible to methicillin, cotrimoxazole and levofloxacin) and Proteus mirabilis (non-ESBL; susceptible to cotrimoxazole and

## 2020-06-09 LAB
ANION GAP SERPL CALCULATED.3IONS-SCNC: 12 MMOL/L (ref 7–16)
ANISOCYTOSIS: ABNORMAL
BASOPHILS ABSOLUTE: 0.13 E9/L (ref 0–0.2)
BASOPHILS RELATIVE PERCENT: 2.6 % (ref 0–2)
BUN BLDV-MCNC: 18 MG/DL (ref 6–20)
CALCIUM SERPL-MCNC: 9.1 MG/DL (ref 8.6–10.2)
CHLORIDE BLD-SCNC: 103 MMOL/L (ref 98–107)
CO2: 23 MMOL/L (ref 22–29)
CREAT SERPL-MCNC: 1.3 MG/DL (ref 0.5–1)
EOSINOPHILS ABSOLUTE: 0.53 E9/L (ref 0.05–0.5)
EOSINOPHILS RELATIVE PERCENT: 10.4 % (ref 0–6)
GFR AFRICAN AMERICAN: 52
GFR NON-AFRICAN AMERICAN: 43 ML/MIN/1.73
GLUCOSE BLD-MCNC: 84 MG/DL (ref 74–99)
HCT VFR BLD CALC: 40.5 % (ref 34–48)
HEMOGLOBIN: 13 G/DL (ref 11.5–15.5)
LYMPHOCYTES ABSOLUTE: 2.24 E9/L (ref 1.5–4)
LYMPHOCYTES RELATIVE PERCENT: 44.3 % (ref 20–42)
MCH RBC QN AUTO: 33.2 PG (ref 26–35)
MCHC RBC AUTO-ENTMCNC: 32.1 % (ref 32–34.5)
MCV RBC AUTO: 103.3 FL (ref 80–99.9)
MONOCYTES ABSOLUTE: 0.56 E9/L (ref 0.1–0.95)
MONOCYTES RELATIVE PERCENT: 11.3 % (ref 2–12)
NEUTROPHILS ABSOLUTE: 1.58 E9/L (ref 1.8–7.3)
NEUTROPHILS RELATIVE PERCENT: 31.3 % (ref 43–80)
PDW BLD-RTO: 12.8 FL (ref 11.5–15)
PLATELET # BLD: 126 E9/L (ref 130–450)
PMV BLD AUTO: 11.7 FL (ref 7–12)
POTASSIUM REFLEX MAGNESIUM: 4.5 MMOL/L (ref 3.5–5)
RBC # BLD: 3.92 E12/L (ref 3.5–5.5)
SODIUM BLD-SCNC: 138 MMOL/L (ref 132–146)
WBC # BLD: 5.1 E9/L (ref 4.5–11.5)

## 2020-06-09 PROCEDURE — 6370000000 HC RX 637 (ALT 250 FOR IP): Performed by: INTERNAL MEDICINE

## 2020-06-09 PROCEDURE — 36415 COLL VENOUS BLD VENIPUNCTURE: CPT

## 2020-06-09 PROCEDURE — 2140000000 HC CCU INTERMEDIATE R&B

## 2020-06-09 PROCEDURE — 6360000002 HC RX W HCPCS: Performed by: STUDENT IN AN ORGANIZED HEALTH CARE EDUCATION/TRAINING PROGRAM

## 2020-06-09 PROCEDURE — 6370000000 HC RX 637 (ALT 250 FOR IP): Performed by: STUDENT IN AN ORGANIZED HEALTH CARE EDUCATION/TRAINING PROGRAM

## 2020-06-09 PROCEDURE — 85025 COMPLETE CBC W/AUTO DIFF WBC: CPT

## 2020-06-09 PROCEDURE — 2580000003 HC RX 258: Performed by: STUDENT IN AN ORGANIZED HEALTH CARE EDUCATION/TRAINING PROGRAM

## 2020-06-09 PROCEDURE — 80048 BASIC METABOLIC PNL TOTAL CA: CPT

## 2020-06-09 RX ORDER — HYDROCODONE BITARTRATE AND ACETAMINOPHEN 5; 325 MG/1; MG/1
1 TABLET ORAL EVERY 6 HOURS PRN
Status: DISCONTINUED | OUTPATIENT
Start: 2020-06-09 | End: 2020-06-16 | Stop reason: HOSPADM

## 2020-06-09 RX ADMIN — HYDROCODONE BITARTRATE AND ACETAMINOPHEN 1 TABLET: 5; 325 TABLET ORAL at 17:20

## 2020-06-09 RX ADMIN — ACETAMINOPHEN 650 MG: 325 TABLET, FILM COATED ORAL at 14:56

## 2020-06-09 RX ADMIN — Medication 10 ML: at 20:43

## 2020-06-09 RX ADMIN — ACETAMINOPHEN 650 MG: 325 TABLET, FILM COATED ORAL at 09:07

## 2020-06-09 RX ADMIN — FERROUS SULFATE TAB 325 MG (65 MG ELEMENTAL FE) 325 MG: 325 (65 FE) TAB at 09:07

## 2020-06-09 RX ADMIN — DIVALPROEX SODIUM 500 MG: 500 TABLET, EXTENDED RELEASE ORAL at 09:07

## 2020-06-09 RX ADMIN — GABAPENTIN 100 MG: 100 CAPSULE ORAL at 14:56

## 2020-06-09 RX ADMIN — SERTRALINE 50 MG: 50 TABLET, FILM COATED ORAL at 20:43

## 2020-06-09 RX ADMIN — DOCUSATE SODIUM 100 MG: 100 CAPSULE, LIQUID FILLED ORAL at 09:07

## 2020-06-09 RX ADMIN — MIRTAZAPINE 7.5 MG: 15 TABLET, FILM COATED ORAL at 20:43

## 2020-06-09 RX ADMIN — ACETAMINOPHEN 650 MG: 325 TABLET, FILM COATED ORAL at 20:43

## 2020-06-09 RX ADMIN — GABAPENTIN 100 MG: 100 CAPSULE ORAL at 20:44

## 2020-06-09 RX ADMIN — PANTOPRAZOLE SODIUM 40 MG: 40 TABLET, DELAYED RELEASE ORAL at 06:01

## 2020-06-09 RX ADMIN — ENOXAPARIN SODIUM 40 MG: 40 INJECTION SUBCUTANEOUS at 09:06

## 2020-06-09 RX ADMIN — GABAPENTIN 100 MG: 100 CAPSULE ORAL at 09:07

## 2020-06-09 RX ADMIN — Medication 10 ML: at 09:06

## 2020-06-09 RX ADMIN — ACETAMINOPHEN 650 MG: 325 TABLET, FILM COATED ORAL at 00:45

## 2020-06-09 RX ADMIN — LEVOFLOXACIN 750 MG: 750 TABLET, FILM COATED ORAL at 09:07

## 2020-06-09 RX ADMIN — ENOXAPARIN SODIUM 40 MG: 40 INJECTION SUBCUTANEOUS at 20:42

## 2020-06-09 RX ADMIN — FOLIC ACID 1 MG: 1 TABLET ORAL at 09:07

## 2020-06-09 ASSESSMENT — PAIN DESCRIPTION - ORIENTATION
ORIENTATION: RIGHT;LEFT;ANTERIOR;POSTERIOR
ORIENTATION: RIGHT;LEFT

## 2020-06-09 ASSESSMENT — PAIN SCALES - GENERAL
PAINLEVEL_OUTOF10: 10
PAINLEVEL_OUTOF10: 10
PAINLEVEL_OUTOF10: 0
PAINLEVEL_OUTOF10: 0
PAINLEVEL_OUTOF10: 3
PAINLEVEL_OUTOF10: 10
PAINLEVEL_OUTOF10: 5
PAINLEVEL_OUTOF10: 10
PAINLEVEL_OUTOF10: 3
PAINLEVEL_OUTOF10: 10
PAINLEVEL_OUTOF10: 10

## 2020-06-09 ASSESSMENT — PAIN DESCRIPTION - ONSET: ONSET: ON-GOING

## 2020-06-09 ASSESSMENT — PAIN DESCRIPTION - PAIN TYPE
TYPE: ACUTE PAIN
TYPE: CHRONIC PAIN
TYPE: CHRONIC PAIN
TYPE: ACUTE PAIN
TYPE: CHRONIC PAIN

## 2020-06-09 ASSESSMENT — PAIN - FUNCTIONAL ASSESSMENT
PAIN_FUNCTIONAL_ASSESSMENT: ACTIVITIES ARE NOT PREVENTED
PAIN_FUNCTIONAL_ASSESSMENT: ACTIVITIES ARE NOT PREVENTED

## 2020-06-09 ASSESSMENT — PAIN DESCRIPTION - LOCATION
LOCATION: FOOT
LOCATION: FOOT
LOCATION: HEAD
LOCATION: FOOT
LOCATION: FOOT

## 2020-06-09 ASSESSMENT — PAIN DESCRIPTION - DESCRIPTORS
DESCRIPTORS: DISCOMFORT;CONSTANT
DESCRIPTORS: ACHING;DISCOMFORT;DULL;HEADACHE
DESCRIPTORS: BURNING;CONSTANT;DISCOMFORT

## 2020-06-09 ASSESSMENT — PAIN DESCRIPTION - FREQUENCY
FREQUENCY: CONTINUOUS
FREQUENCY: INTERMITTENT
FREQUENCY: CONTINUOUS

## 2020-06-09 NOTE — PROGRESS NOTES
[facility] PROGRESS NOTE      Chief complaint: Follow-up of CoNS and Proteus mirabilis CLABSI    The patient is a 44 y.o. female with history of stroke, bipolar disorder, hypertension, hyperlipidemia, on nitrofurantoin at the nursing home, presented on 06/03 after fall from syncope, found to be bradycardic. She is known to have tested positive for COVID-19. On admission, she was afebrile and hemodynamically stable with no leukocytosis. Urinalysis showed insignificant pyuria with urine culture growing more than 100,000 CFU per mL gram-negative rods. Blood cultures showed Staphylococcus capitis and warneri (susceptible to methicillin, cotrimoxazole and levofloxacin) and Proteus mirabilis (non-ESBL; susceptible to cotrimoxazole and levofloxacin). Urine culture showed <10,000 CFU/mL of mixed Gram-positive organisms and >100,000 CFU/mL of Proteus mirabilis. Right femoral TLC was removed on 06/06. Catheter tip culture grew <15 colonies of CoNS. Blood cultures from 06/06 still showed Gram-positive cocci in clusters. Ceftriaxone was started on 06/05. She has been pulling out her IV lines. Ceftriaxone was switched to cotrimoxazole on 06/06 then to levofloxacin on 06/08 for better oral bioavailability. SARS-CoV-2 PCR test was still positive on 06/08. Subjective: Patient was seen and examined. No chills, no abdominal pain, no diarrhea, no rash, no itching. Objective:    Vitals:    06/09/20 0900   BP: 120/78   Pulse: 86   Resp: 14   Temp: 98.2 °F (36.8 °C)   SpO2: 96%     Constitutional: Alert, not in distress  Respiratory: Clear breath sounds, no crackles, no wheezes  Cardiovascular: Regular rate and rhythm, no murmurs  Gastrointestinal: Bowel sounds present, soft, nontender  Skin: Warm and dry, no active dermatoses  Musculoskeletal: No joint swelling, no joint erythema    Labs, imaging, and medical records/notes were personally reviewed.     Assessment:  Staphylococcus capitis and warneri (susceptible to methicillin,

## 2020-06-09 NOTE — PLAN OF CARE
Problem: Falls - Risk of:  Goal: Will remain free from falls  Description: Will remain free from falls  6/9/2020 0934 by Sabino Mracano RN  Outcome: Met This Shift  6/9/2020 0339 by Concetta Amaro RN  Outcome: Met This Shift  Goal: Absence of physical injury  Description: Absence of physical injury  6/9/2020 0934 by Sabino Marcano RN  Outcome: Met This Shift  6/9/2020 0339 by Concetta Amaro RN  Outcome: Met This Shift     Problem: Pain:  Goal: Pain level will decrease  Description: Pain level will decrease  6/9/2020 0934 by Sabino Marcano RN  Outcome: Met This Shift  6/9/2020 0339 by Concetta Amaro RN  Outcome: Met This Shift  Goal: Control of chronic pain  Description: Control of chronic pain  6/9/2020 0934 by Sabino Marcano RN  Outcome: Met This Shift  6/9/2020 0339 by Concetta Amaro RN  Outcome: Met This Shift  Goal: Patient's pain/discomfort is manageable  Description: Patient's pain/discomfort is manageable  Outcome: Met This Shift     Problem: Airway Clearance - Ineffective  Goal: Achieve or maintain patent airway  6/9/2020 0934 by Sabino Marcano RN  Outcome: Met This Shift  6/9/2020 0339 by Concetta Amaro RN  Outcome: Met This Shift     Problem: Gas Exchange - Impaired  Goal: Absence of hypoxia  6/9/2020 0934 by Sabino Marcano RN  Outcome: Met This Shift  6/9/2020 0339 by Concetta Amaro RN  Outcome: Met This Shift  Goal: Promote optimal lung function  6/9/2020 0934 by Sabino Marcano RN  Outcome: Met This Shift  6/9/2020 0339 by Concetta Amaro RN  Outcome: Met This Shift     Problem: Breathing Pattern - Ineffective  Goal: Ability to achieve and maintain a regular respiratory rate  6/9/2020 0339 by Concetta Amaro RN  Outcome: Met This Shift     Problem:  Body Temperature -  Risk of, Imbalanced  Goal: Ability to maintain a body temperature within defined limits  6/9/2020 0934 by Sabino Marcano RN  Outcome: Met This Shift  6/9/2020 0339 by Concetta Amaro RN  Outcome: Met This Shift  Goal: Will regain or maintain usual level of consciousness  6/9/2020 0934 by Juwan Goss RN  Outcome: Met This Shift  6/9/2020 0339 by Michelle Bliss RN  Outcome: Met This Shift  Goal: Complications related to the disease process, condition or treatment will be avoided or minimized  6/9/2020 0934 by Juwan Goss RN  Outcome: Met This Shift  6/9/2020 0339 by Michelle Bliss RN  Outcome: Met This Shift     Problem: Isolation Precautions - Risk of Spread of Infection  Goal: Prevent transmission of infection  6/9/2020 0934 by Juwan Goss RN  Outcome: Met This Shift  6/9/2020 0339 by Michelle Bliss RN  Outcome: Met This Shift     Problem: Nutrition Deficits  Goal: Optimize nutrtional status  6/9/2020 0934 by Juwan Goss RN  Outcome: Met This Shift  6/9/2020 0339 by Michelle Bliss RN  Outcome: Met This Shift     Problem: Risk for Fluid Volume Deficit  Goal: Maintain normal heart rhythm  6/9/2020 0934 by Juwan Goss RN  Outcome: Met This Shift  6/9/2020 0339 by Michelle Bliss RN  Outcome: Met This Shift  Goal: Maintain absence of muscle cramping  6/9/2020 0934 by Juwan Goss RN  Outcome: Met This Shift  6/9/2020 0339 by Michelle Bliss RN  Outcome: Met This Shift  Goal: Maintain normal serum potassium, sodium, calcium, phosphorus, and pH  6/9/2020 0934 by Juwan Goss RN  Outcome: Met This Shift  6/9/2020 0339 by Michelle Bliss RN  Outcome: Met This Shift     Problem: Loneliness or Risk for Loneliness  Goal: Demonstrate positive use of time alone when socialization is not possible  6/9/2020 0934 by Juwan Goss RN  Outcome: Met This Shift  6/9/2020 0339 by Michelle Bliss RN  Outcome: Met This Shift     Problem: Fatigue  Goal: Verbalize increase energy and improved vitality  6/9/2020 0934 by Juwan Goss RN  Outcome: Met This Shift  6/9/2020 0339 by Michelle Bliss RN  Outcome: Met This Shift     Problem: Patient Education: Go to Patient Education Activity  Goal:

## 2020-06-09 NOTE — PROGRESS NOTES
sinuses, especially the ethmoid and maxillary sinuses. Small air-fluid level in the left maxillary sinus. The mastoids are clear. 1. No skull fracture or acute intracranial abnormality. 2. Moderate volume loss in the cerebrum, the extent of which is greater than is typical for age. 3. Volume loss in the left occipital lobe, with ex vacuo dilation of the occipital horn. Findings likely represent result of a chronic infarction, although no encephalomalacia is evident by CT. 4. Mild chronic mucosal thickening in the paranasal sinuses, with small air-fluid level in the left maxillary sinus. Clinical correlation for acute sinusitis recommended. Ct Cervical Spine Wo Contrast    Result Date: 6/3/2020  Patient MRN:  [id number] : 1970 Age: 44 years Gender: Female Order Date:  6/3/2020 1:00 PM EXAM: CT CERVICAL SPINE WO CONTRAST INDICATION:  trauma trauma COMPARISON: None Multiple CT sections were obtained with sagittal and coronal MPR reconstructions. FINDINGS: BONES: No fracture or joint dislocation. Vertebral body heights are preserved. No bony destructive lesion. DISC SPACES: Mild loss of disc height with small disc osteophyte complex at that C5-6 and C6-7 resulting in at least mild central canal stenoses. Mild-to-moderate neural foraminal stenoses at C6-7. Delphina Fiddler PARASPINAL SOFT TISSUES:Unremarkable. MISCELLANEOUS:No additional significant finding noted. 1. No fracture or joint dislocation. 2. Degenerative changes, as described. Ct Thoracic Spine Wo Contrast    Result Date: 6/3/2020  Patient MRN:  [id number] : 1970 Age: 44 years Gender: Female Order Date:  6/3/2020 1:00 PM EXAM: CT THORACIC SPINE WO CONTRAST INDICATION:  trauma trauma COMPARISON: None Multiple CT sections were obtained with sagittal and coronal MPR reconstructions. FINDINGS: BONES: No fracture or joint dislocation. Vertebral body heights are preserved. No bony destructive lesion. One Arch Adonay SPACES:Unremarkable.  PARASPINAL SOFT TISSUES:Unremarkable. MISCELLANEOUS:No additional significant finding noted. No fracture or joint dislocation. Ct Lumbar Spine Wo Contrast    Result Date: 6/3/2020  Patient MRN: [id number] : 1970 Age:  44 years Gender: Female Order Date: 6/3/2020 1:00 PM Exam: CT LUMBAR SPINE WO CONTRAST Indication:   TRAUMA trauma Comparison: None. FINDINGS: No fracture or joint dislocation. The lumbar lordosis is preserved. Vertebral body heights are intact. No bony destructive lesion is seen. L1-2: Unremarkable. L2-3: Unremarkable. L3-4: Unremarkable. L4-5: Small disc bulge. Mild facet hypertrophy. No central canal or lateral recess stenosis. Mild neural foraminal stenoses. L5-S1: Mild loss of disc height with small disc bulge. Mild facet hypertrophy. No central canal or lateral recess stenosis. Mild neural foraminal stenoses. 1. No fracture or joint dislocation. 2. Small disc bulges resulting in mild neural foraminal stenoses at L4-5 and L5-S1. Xr Chest 1 Vw    Result Date: 6/3/2020  Patient MRN:  [id number] : 1970 Age: 44 years Gender: Female Order Date:  6/3/2020 1:15 PM EXAM: XR CHEST 1 VIEW NUMBER OF IMAGES:  1 INDICATION:  TRAUMA TRAUMA COMPARISON: None FINDINGS: Subtle hazy opacity seen in the right lung. The cardiomediastinal contour is unremarkable. No pneumothorax or pleural effusion. A pacer pad is seen along the right chest wall. Hazy opacity in the upper right lung, which may represent atelectasis, consolidation or contusion. Radiographic follow-up is recommended.       Medications:    Scheduled Meds:   levoFLOXacin  750 mg Oral Daily    docusate sodium  100 mg Oral BID    senna  1 tablet Oral Nightly    enoxaparin  40 mg Subcutaneous BID    sodium chloride flush  10 mL Intravenous 2 times per day    divalproex  500 mg Oral Daily    mirtazapine  7.5 mg Oral Nightly    sertraline  50 mg Oral Nightly    gabapentin  100 mg Oral TID    folic acid  1 mg Oral Daily    pantoprazole

## 2020-06-09 NOTE — PLAN OF CARE
Problem: Falls - Risk of:  Goal: Will remain free from falls  Description: Will remain free from falls  Outcome: Met This Shift  Goal: Absence of physical injury  Description: Absence of physical injury  Outcome: Met This Shift     Problem: Pain:  Goal: Pain level will decrease  Description: Pain level will decrease  Outcome: Met This Shift  Goal: Control of acute pain  Description: Control of acute pain  Outcome: Met This Shift  Goal: Control of chronic pain  Description: Control of chronic pain  Outcome: Met This Shift     Problem: Suicide risk  Goal: Provide patient with safe environment  Description: Provide patient with safe environment  Outcome: Met This Shift     Problem: Airway Clearance - Ineffective  Goal: Achieve or maintain patent airway  Outcome: Met This Shift     Problem: Gas Exchange - Impaired  Goal: Absence of hypoxia  Outcome: Met This Shift  Goal: Promote optimal lung function  Outcome: Met This Shift     Problem: Breathing Pattern - Ineffective  Goal: Ability to achieve and maintain a regular respiratory rate  Outcome: Met This Shift     Problem:  Body Temperature -  Risk of, Imbalanced  Goal: Ability to maintain a body temperature within defined limits  Outcome: Met This Shift  Goal: Will regain or maintain usual level of consciousness  Outcome: Met This Shift  Goal: Complications related to the disease process, condition or treatment will be avoided or minimized  Outcome: Met This Shift     Problem: Isolation Precautions - Risk of Spread of Infection  Goal: Prevent transmission of infection  Outcome: Met This Shift     Problem: Nutrition Deficits  Goal: Optimize nutrtional status  Outcome: Met This Shift     Problem: Risk for Fluid Volume Deficit  Goal: Maintain normal heart rhythm  Outcome: Met This Shift  Goal: Maintain absence of muscle cramping  Outcome: Met This Shift  Goal: Maintain normal serum potassium, sodium, calcium, phosphorus, and pH  Outcome: Met This Shift     Problem:

## 2020-06-10 LAB
ANION GAP SERPL CALCULATED.3IONS-SCNC: 13 MMOL/L (ref 7–16)
BASOPHILS ABSOLUTE: 0.08 E9/L (ref 0–0.2)
BASOPHILS RELATIVE PERCENT: 1.1 % (ref 0–2)
BLOOD CULTURE, ROUTINE: ABNORMAL
BUN BLDV-MCNC: 26 MG/DL (ref 6–20)
CALCIUM SERPL-MCNC: 9 MG/DL (ref 8.6–10.2)
CHLORIDE BLD-SCNC: 102 MMOL/L (ref 98–107)
CO2: 24 MMOL/L (ref 22–29)
CREAT SERPL-MCNC: 1.1 MG/DL (ref 0.5–1)
EOSINOPHILS ABSOLUTE: 0.55 E9/L (ref 0.05–0.5)
EOSINOPHILS RELATIVE PERCENT: 7.7 % (ref 0–6)
GFR AFRICAN AMERICAN: >60
GFR NON-AFRICAN AMERICAN: 52 ML/MIN/1.73
GLUCOSE BLD-MCNC: 99 MG/DL (ref 74–99)
HCT VFR BLD CALC: 38.2 % (ref 34–48)
HEMOGLOBIN: 12.7 G/DL (ref 11.5–15.5)
IMMATURE GRANULOCYTES #: 0.02 E9/L
IMMATURE GRANULOCYTES %: 0.3 % (ref 0–5)
LYMPHOCYTES ABSOLUTE: 3.29 E9/L (ref 1.5–4)
LYMPHOCYTES RELATIVE PERCENT: 46 % (ref 20–42)
MCH RBC QN AUTO: 33.5 PG (ref 26–35)
MCHC RBC AUTO-ENTMCNC: 33.2 % (ref 32–34.5)
MCV RBC AUTO: 100.8 FL (ref 80–99.9)
MONOCYTES ABSOLUTE: 0.8 E9/L (ref 0.1–0.95)
MONOCYTES RELATIVE PERCENT: 11.2 % (ref 2–12)
NEUTROPHILS ABSOLUTE: 2.41 E9/L (ref 1.8–7.3)
NEUTROPHILS RELATIVE PERCENT: 33.7 % (ref 43–80)
ORGANISM: ABNORMAL
ORGANISM: ABNORMAL
PDW BLD-RTO: 12.8 FL (ref 11.5–15)
PLATELET # BLD: 191 E9/L (ref 130–450)
PMV BLD AUTO: 10.8 FL (ref 7–12)
POTASSIUM REFLEX MAGNESIUM: 4.6 MMOL/L (ref 3.5–5)
RBC # BLD: 3.79 E12/L (ref 3.5–5.5)
SODIUM BLD-SCNC: 139 MMOL/L (ref 132–146)
WBC # BLD: 7.2 E9/L (ref 4.5–11.5)

## 2020-06-10 PROCEDURE — 6370000000 HC RX 637 (ALT 250 FOR IP): Performed by: STUDENT IN AN ORGANIZED HEALTH CARE EDUCATION/TRAINING PROGRAM

## 2020-06-10 PROCEDURE — 2580000003 HC RX 258: Performed by: STUDENT IN AN ORGANIZED HEALTH CARE EDUCATION/TRAINING PROGRAM

## 2020-06-10 PROCEDURE — 36415 COLL VENOUS BLD VENIPUNCTURE: CPT

## 2020-06-10 PROCEDURE — 6370000000 HC RX 637 (ALT 250 FOR IP): Performed by: INTERNAL MEDICINE

## 2020-06-10 PROCEDURE — 2140000000 HC CCU INTERMEDIATE R&B

## 2020-06-10 PROCEDURE — 6360000002 HC RX W HCPCS: Performed by: STUDENT IN AN ORGANIZED HEALTH CARE EDUCATION/TRAINING PROGRAM

## 2020-06-10 PROCEDURE — 80048 BASIC METABOLIC PNL TOTAL CA: CPT

## 2020-06-10 PROCEDURE — 85025 COMPLETE CBC W/AUTO DIFF WBC: CPT

## 2020-06-10 RX ADMIN — PANTOPRAZOLE SODIUM 40 MG: 40 TABLET, DELAYED RELEASE ORAL at 06:55

## 2020-06-10 RX ADMIN — GABAPENTIN 100 MG: 100 CAPSULE ORAL at 13:07

## 2020-06-10 RX ADMIN — ENOXAPARIN SODIUM 40 MG: 40 INJECTION SUBCUTANEOUS at 20:24

## 2020-06-10 RX ADMIN — MIRTAZAPINE 7.5 MG: 15 TABLET, FILM COATED ORAL at 20:23

## 2020-06-10 RX ADMIN — LEVOFLOXACIN 750 MG: 750 TABLET, FILM COATED ORAL at 09:32

## 2020-06-10 RX ADMIN — Medication 10 ML: at 09:32

## 2020-06-10 RX ADMIN — ENOXAPARIN SODIUM 40 MG: 40 INJECTION SUBCUTANEOUS at 09:32

## 2020-06-10 RX ADMIN — DIVALPROEX SODIUM 500 MG: 500 TABLET, EXTENDED RELEASE ORAL at 09:32

## 2020-06-10 RX ADMIN — DOCUSATE SODIUM 100 MG: 100 CAPSULE, LIQUID FILLED ORAL at 09:32

## 2020-06-10 RX ADMIN — HYDROCODONE BITARTRATE AND ACETAMINOPHEN 1 TABLET: 5; 325 TABLET ORAL at 20:24

## 2020-06-10 RX ADMIN — SERTRALINE 50 MG: 50 TABLET, FILM COATED ORAL at 20:24

## 2020-06-10 RX ADMIN — HYDROCODONE BITARTRATE AND ACETAMINOPHEN 1 TABLET: 5; 325 TABLET ORAL at 13:07

## 2020-06-10 RX ADMIN — FOLIC ACID 1 MG: 1 TABLET ORAL at 09:32

## 2020-06-10 RX ADMIN — LORAZEPAM 1 MG: 1 TABLET ORAL at 22:45

## 2020-06-10 RX ADMIN — GABAPENTIN 100 MG: 100 CAPSULE ORAL at 09:32

## 2020-06-10 RX ADMIN — GABAPENTIN 100 MG: 100 CAPSULE ORAL at 20:24

## 2020-06-10 RX ADMIN — ACETAMINOPHEN 650 MG: 325 TABLET, FILM COATED ORAL at 17:36

## 2020-06-10 RX ADMIN — HYDROCODONE BITARTRATE AND ACETAMINOPHEN 1 TABLET: 5; 325 TABLET ORAL at 06:55

## 2020-06-10 RX ADMIN — FERROUS SULFATE TAB 325 MG (65 MG ELEMENTAL FE) 325 MG: 325 (65 FE) TAB at 09:32

## 2020-06-10 ASSESSMENT — PAIN SCALES - GENERAL
PAINLEVEL_OUTOF10: 10
PAINLEVEL_OUTOF10: 6
PAINLEVEL_OUTOF10: 0
PAINLEVEL_OUTOF10: 10
PAINLEVEL_OUTOF10: 6
PAINLEVEL_OUTOF10: 0
PAINLEVEL_OUTOF10: 9
PAINLEVEL_OUTOF10: 10
PAINLEVEL_OUTOF10: 10

## 2020-06-10 ASSESSMENT — PAIN DESCRIPTION - DESCRIPTORS: DESCRIPTORS: BURNING;CONSTANT;DISCOMFORT

## 2020-06-10 ASSESSMENT — PAIN DESCRIPTION - FREQUENCY: FREQUENCY: CONTINUOUS

## 2020-06-10 ASSESSMENT — PAIN DESCRIPTION - ORIENTATION: ORIENTATION: RIGHT;LEFT

## 2020-06-10 ASSESSMENT — PAIN DESCRIPTION - LOCATION: LOCATION: FOOT

## 2020-06-10 ASSESSMENT — PAIN DESCRIPTION - PAIN TYPE
TYPE: CHRONIC PAIN
TYPE: CHRONIC PAIN

## 2020-06-10 ASSESSMENT — PAIN DESCRIPTION - ONSET: ONSET: ON-GOING

## 2020-06-10 NOTE — CARE COORDINATION
SOCIAL WORK/DISCHARGE PLANNING;  Care coordination update; continue to follow for return to [facility]. Pt for covid retest tomorrow. She is to have FLORINDA prior to discharge, once covid test negative. Plan: return to [facility].   Vidya CORBIN

## 2020-06-10 NOTE — PROGRESS NOTES
DRAGAN PROGRESS NOTE      Chief complaint: Follow-up of CoNS and Proteus mirabilis CLABSI    The patient is a 44 y.o. female with history of stroke, bipolar disorder, hypertension, hyperlipidemia, on nitrofurantoin at the nursing home, presented on 06/03 after fall from syncope, found to be bradycardic. She is known to have tested positive for COVID-19. On admission, she was afebrile and hemodynamically stable with no leukocytosis. Urinalysis showed insignificant pyuria with urine culture growing more than 100,000 CFU per mL gram-negative rods. Blood cultures showed Staphylococcus capitis and warneri (susceptible to methicillin, cotrimoxazole and levofloxacin) and Proteus mirabilis (non-ESBL; susceptible to cotrimoxazole and levofloxacin). Urine culture showed <10,000 CFU/mL of mixed Gram-positive organisms and >100,000 CFU/mL of Proteus mirabilis. Right femoral TLC was removed on 06/06. Catheter tip culture grew <15 colonies of CoNS. Blood cultures from 06/06 still showed Gram-positive cocci in clusters. Ceftriaxone was started on 06/05. She has been pulling out her IV lines. Ceftriaxone was switched to cotrimoxazole on 06/06 then to levofloxacin on 06/08 for better oral bioavailability. SARS-CoV-2 PCR test was still positive on 06/08. Subjective: Patient was seen and examined. No chills, no abdominal pain, no diarrhea, no rash, no itching. Objective:    Vitals:    06/10/20 0740   BP: 106/80   Pulse: 94   Resp: 14   Temp: 99 °F (37.2 °C)   SpO2: 95%     Constitutional: Alert, not in distress  Respiratory: Clear breath sounds, no crackles, no wheezes  Cardiovascular: Regular rate and rhythm, no murmurs  Gastrointestinal: Bowel sounds present, soft, nontender  Skin: Warm and dry, no active dermatoses  Musculoskeletal: No joint swelling, no joint erythema    Labs, imaging, and medical records/notes were personally reviewed.     Assessment:  Staphylococcus capitis and warneri (susceptible to methicillin,

## 2020-06-11 LAB
ANION GAP SERPL CALCULATED.3IONS-SCNC: 13 MMOL/L (ref 7–16)
BASOPHILS ABSOLUTE: 0.07 E9/L (ref 0–0.2)
BASOPHILS RELATIVE PERCENT: 1.2 % (ref 0–2)
BUN BLDV-MCNC: 25 MG/DL (ref 6–20)
CALCIUM SERPL-MCNC: 9.1 MG/DL (ref 8.6–10.2)
CHLORIDE BLD-SCNC: 101 MMOL/L (ref 98–107)
CO2: 23 MMOL/L (ref 22–29)
CREAT SERPL-MCNC: 1.2 MG/DL (ref 0.5–1)
EOSINOPHILS ABSOLUTE: 0.53 E9/L (ref 0.05–0.5)
EOSINOPHILS RELATIVE PERCENT: 8.9 % (ref 0–6)
GFR AFRICAN AMERICAN: 57
GFR NON-AFRICAN AMERICAN: 47 ML/MIN/1.73
GLUCOSE BLD-MCNC: 79 MG/DL (ref 74–99)
HCT VFR BLD CALC: 37.9 % (ref 34–48)
HEMOGLOBIN: 12.5 G/DL (ref 11.5–15.5)
IMMATURE GRANULOCYTES #: 0.01 E9/L
IMMATURE GRANULOCYTES %: 0.2 % (ref 0–5)
LYMPHOCYTES ABSOLUTE: 2.47 E9/L (ref 1.5–4)
LYMPHOCYTES RELATIVE PERCENT: 41.7 % (ref 20–42)
MCH RBC QN AUTO: 33.4 PG (ref 26–35)
MCHC RBC AUTO-ENTMCNC: 33 % (ref 32–34.5)
MCV RBC AUTO: 101.3 FL (ref 80–99.9)
MONOCYTES ABSOLUTE: 0.7 E9/L (ref 0.1–0.95)
MONOCYTES RELATIVE PERCENT: 11.8 % (ref 2–12)
NEUTROPHILS ABSOLUTE: 2.15 E9/L (ref 1.8–7.3)
NEUTROPHILS RELATIVE PERCENT: 36.2 % (ref 43–80)
ORGANISM: ABNORMAL
ORGANISM: ABNORMAL
PDW BLD-RTO: 12.7 FL (ref 11.5–15)
PLATELET # BLD: 193 E9/L (ref 130–450)
PMV BLD AUTO: 11.1 FL (ref 7–12)
POTASSIUM REFLEX MAGNESIUM: 4.5 MMOL/L (ref 3.5–5)
RBC # BLD: 3.74 E12/L (ref 3.5–5.5)
SARS-COV-2, NAAT: NOT DETECTED
SODIUM BLD-SCNC: 137 MMOL/L (ref 132–146)
WBC # BLD: 5.9 E9/L (ref 4.5–11.5)

## 2020-06-11 PROCEDURE — 85025 COMPLETE CBC W/AUTO DIFF WBC: CPT

## 2020-06-11 PROCEDURE — 6360000002 HC RX W HCPCS: Performed by: STUDENT IN AN ORGANIZED HEALTH CARE EDUCATION/TRAINING PROGRAM

## 2020-06-11 PROCEDURE — 2140000000 HC CCU INTERMEDIATE R&B

## 2020-06-11 PROCEDURE — 80048 BASIC METABOLIC PNL TOTAL CA: CPT

## 2020-06-11 PROCEDURE — 6370000000 HC RX 637 (ALT 250 FOR IP): Performed by: STUDENT IN AN ORGANIZED HEALTH CARE EDUCATION/TRAINING PROGRAM

## 2020-06-11 PROCEDURE — 6370000000 HC RX 637 (ALT 250 FOR IP): Performed by: INTERNAL MEDICINE

## 2020-06-11 PROCEDURE — U0002 COVID-19 LAB TEST NON-CDC: HCPCS

## 2020-06-11 PROCEDURE — 36415 COLL VENOUS BLD VENIPUNCTURE: CPT

## 2020-06-11 RX ORDER — TIZANIDINE 4 MG/1
4 TABLET ORAL EVERY 6 HOURS PRN
Status: DISCONTINUED | OUTPATIENT
Start: 2020-06-11 | End: 2020-06-16 | Stop reason: HOSPADM

## 2020-06-11 RX ADMIN — HYDROCODONE BITARTRATE AND ACETAMINOPHEN 1 TABLET: 5; 325 TABLET ORAL at 13:45

## 2020-06-11 RX ADMIN — DIVALPROEX SODIUM 500 MG: 500 TABLET, EXTENDED RELEASE ORAL at 07:43

## 2020-06-11 RX ADMIN — DOCUSATE SODIUM 100 MG: 100 CAPSULE, LIQUID FILLED ORAL at 07:46

## 2020-06-11 RX ADMIN — LEVOFLOXACIN 750 MG: 750 TABLET, FILM COATED ORAL at 07:43

## 2020-06-11 RX ADMIN — LORAZEPAM 1 MG: 1 TABLET ORAL at 23:09

## 2020-06-11 RX ADMIN — GABAPENTIN 100 MG: 100 CAPSULE ORAL at 16:19

## 2020-06-11 RX ADMIN — PANTOPRAZOLE SODIUM 40 MG: 40 TABLET, DELAYED RELEASE ORAL at 07:15

## 2020-06-11 RX ADMIN — FERROUS SULFATE TAB 325 MG (65 MG ELEMENTAL FE) 325 MG: 325 (65 FE) TAB at 07:15

## 2020-06-11 RX ADMIN — HYDROCODONE BITARTRATE AND ACETAMINOPHEN 1 TABLET: 5; 325 TABLET ORAL at 20:11

## 2020-06-11 RX ADMIN — ACETAMINOPHEN 650 MG: 325 TABLET, FILM COATED ORAL at 12:16

## 2020-06-11 RX ADMIN — ENOXAPARIN SODIUM 40 MG: 40 INJECTION SUBCUTANEOUS at 07:45

## 2020-06-11 RX ADMIN — TIZANIDINE 4 MG: 4 TABLET ORAL at 12:48

## 2020-06-11 RX ADMIN — FOLIC ACID 1 MG: 1 TABLET ORAL at 07:45

## 2020-06-11 RX ADMIN — ENOXAPARIN SODIUM 40 MG: 40 INJECTION SUBCUTANEOUS at 20:13

## 2020-06-11 RX ADMIN — GABAPENTIN 100 MG: 100 CAPSULE ORAL at 07:44

## 2020-06-11 RX ADMIN — MIRTAZAPINE 7.5 MG: 15 TABLET, FILM COATED ORAL at 20:12

## 2020-06-11 RX ADMIN — SERTRALINE 50 MG: 50 TABLET, FILM COATED ORAL at 20:12

## 2020-06-11 RX ADMIN — GABAPENTIN 100 MG: 100 CAPSULE ORAL at 20:11

## 2020-06-11 RX ADMIN — HYDROCODONE BITARTRATE AND ACETAMINOPHEN 1 TABLET: 5; 325 TABLET ORAL at 07:44

## 2020-06-11 ASSESSMENT — PAIN DESCRIPTION - PAIN TYPE
TYPE: CHRONIC PAIN

## 2020-06-11 ASSESSMENT — PAIN DESCRIPTION - FREQUENCY
FREQUENCY: CONTINUOUS

## 2020-06-11 ASSESSMENT — PAIN DESCRIPTION - ONSET
ONSET: ON-GOING

## 2020-06-11 ASSESSMENT — PAIN DESCRIPTION - ORIENTATION
ORIENTATION: RIGHT;LEFT

## 2020-06-11 ASSESSMENT — PAIN DESCRIPTION - PROGRESSION
CLINICAL_PROGRESSION: NOT CHANGED

## 2020-06-11 ASSESSMENT — PAIN SCALES - GENERAL
PAINLEVEL_OUTOF10: 10

## 2020-06-11 ASSESSMENT — PAIN - FUNCTIONAL ASSESSMENT
PAIN_FUNCTIONAL_ASSESSMENT: PREVENTS OR INTERFERES SOME ACTIVE ACTIVITIES AND ADLS

## 2020-06-11 ASSESSMENT — PAIN DESCRIPTION - DESCRIPTORS
DESCRIPTORS: CONSTANT

## 2020-06-12 LAB
ANION GAP SERPL CALCULATED.3IONS-SCNC: 12 MMOL/L (ref 7–16)
BASOPHILS ABSOLUTE: 0.06 E9/L (ref 0–0.2)
BASOPHILS RELATIVE PERCENT: 1 % (ref 0–2)
BLOOD CULTURE, ROUTINE: NORMAL
BUN BLDV-MCNC: 28 MG/DL (ref 6–20)
CALCIUM SERPL-MCNC: 9.1 MG/DL (ref 8.6–10.2)
CHLORIDE BLD-SCNC: 101 MMOL/L (ref 98–107)
CO2: 24 MMOL/L (ref 22–29)
CREAT SERPL-MCNC: 1 MG/DL (ref 0.5–1)
EOSINOPHILS ABSOLUTE: 0.5 E9/L (ref 0.05–0.5)
EOSINOPHILS RELATIVE PERCENT: 8.2 % (ref 0–6)
GFR AFRICAN AMERICAN: >60
GFR NON-AFRICAN AMERICAN: 58 ML/MIN/1.73
GLUCOSE BLD-MCNC: 93 MG/DL (ref 74–99)
HCT VFR BLD CALC: 38.1 % (ref 34–48)
HEMOGLOBIN: 12.3 G/DL (ref 11.5–15.5)
IMMATURE GRANULOCYTES #: 0.02 E9/L
IMMATURE GRANULOCYTES %: 0.3 % (ref 0–5)
LV EF: 63 %
LVEF MODALITY: NORMAL
LYMPHOCYTES ABSOLUTE: 2.53 E9/L (ref 1.5–4)
LYMPHOCYTES RELATIVE PERCENT: 41.6 % (ref 20–42)
MCH RBC QN AUTO: 33.4 PG (ref 26–35)
MCHC RBC AUTO-ENTMCNC: 32.3 % (ref 32–34.5)
MCV RBC AUTO: 103.5 FL (ref 80–99.9)
MONOCYTES ABSOLUTE: 0.68 E9/L (ref 0.1–0.95)
MONOCYTES RELATIVE PERCENT: 11.2 % (ref 2–12)
NEUTROPHILS ABSOLUTE: 2.29 E9/L (ref 1.8–7.3)
NEUTROPHILS RELATIVE PERCENT: 37.7 % (ref 43–80)
PDW BLD-RTO: 12.8 FL (ref 11.5–15)
PLATELET # BLD: 176 E9/L (ref 130–450)
PMV BLD AUTO: 11.9 FL (ref 7–12)
POTASSIUM REFLEX MAGNESIUM: 4.2 MMOL/L (ref 3.5–5)
RBC # BLD: 3.68 E12/L (ref 3.5–5.5)
SODIUM BLD-SCNC: 137 MMOL/L (ref 132–146)
WBC # BLD: 6.1 E9/L (ref 4.5–11.5)

## 2020-06-12 PROCEDURE — 85025 COMPLETE CBC W/AUTO DIFF WBC: CPT

## 2020-06-12 PROCEDURE — 6370000000 HC RX 637 (ALT 250 FOR IP): Performed by: INTERNAL MEDICINE

## 2020-06-12 PROCEDURE — 2580000003 HC RX 258: Performed by: STUDENT IN AN ORGANIZED HEALTH CARE EDUCATION/TRAINING PROGRAM

## 2020-06-12 PROCEDURE — 36415 COLL VENOUS BLD VENIPUNCTURE: CPT

## 2020-06-12 PROCEDURE — 2140000000 HC CCU INTERMEDIATE R&B

## 2020-06-12 PROCEDURE — 80048 BASIC METABOLIC PNL TOTAL CA: CPT

## 2020-06-12 PROCEDURE — 93308 TTE F-UP OR LMTD: CPT

## 2020-06-12 PROCEDURE — 6370000000 HC RX 637 (ALT 250 FOR IP): Performed by: STUDENT IN AN ORGANIZED HEALTH CARE EDUCATION/TRAINING PROGRAM

## 2020-06-12 PROCEDURE — 6360000002 HC RX W HCPCS: Performed by: STUDENT IN AN ORGANIZED HEALTH CARE EDUCATION/TRAINING PROGRAM

## 2020-06-12 RX ORDER — LEVOFLOXACIN 750 MG/1
750 TABLET ORAL DAILY
Qty: 9 TABLET | Refills: 0 | Status: SHIPPED | OUTPATIENT
Start: 2020-06-13 | End: 2020-06-15 | Stop reason: SDUPTHER

## 2020-06-12 RX ADMIN — ENOXAPARIN SODIUM 40 MG: 40 INJECTION SUBCUTANEOUS at 21:46

## 2020-06-12 RX ADMIN — SERTRALINE 50 MG: 50 TABLET, FILM COATED ORAL at 21:46

## 2020-06-12 RX ADMIN — LEVOFLOXACIN 750 MG: 750 TABLET, FILM COATED ORAL at 09:15

## 2020-06-12 RX ADMIN — DIVALPROEX SODIUM 500 MG: 500 TABLET, EXTENDED RELEASE ORAL at 09:15

## 2020-06-12 RX ADMIN — PANTOPRAZOLE SODIUM 40 MG: 40 TABLET, DELAYED RELEASE ORAL at 07:04

## 2020-06-12 RX ADMIN — FOLIC ACID 1 MG: 1 TABLET ORAL at 09:16

## 2020-06-12 RX ADMIN — HYDROCODONE BITARTRATE AND ACETAMINOPHEN 1 TABLET: 5; 325 TABLET ORAL at 07:03

## 2020-06-12 RX ADMIN — GABAPENTIN 100 MG: 100 CAPSULE ORAL at 15:55

## 2020-06-12 RX ADMIN — TIZANIDINE 4 MG: 4 TABLET ORAL at 22:58

## 2020-06-12 RX ADMIN — GABAPENTIN 100 MG: 100 CAPSULE ORAL at 21:46

## 2020-06-12 RX ADMIN — DOCUSATE SODIUM 100 MG: 100 CAPSULE, LIQUID FILLED ORAL at 09:16

## 2020-06-12 RX ADMIN — ENOXAPARIN SODIUM 40 MG: 40 INJECTION SUBCUTANEOUS at 09:16

## 2020-06-12 RX ADMIN — FERROUS SULFATE TAB 325 MG (65 MG ELEMENTAL FE) 325 MG: 325 (65 FE) TAB at 07:04

## 2020-06-12 RX ADMIN — HYDROCODONE BITARTRATE AND ACETAMINOPHEN 1 TABLET: 5; 325 TABLET ORAL at 13:12

## 2020-06-12 RX ADMIN — GABAPENTIN 100 MG: 100 CAPSULE ORAL at 09:16

## 2020-06-12 RX ADMIN — HYDROCODONE BITARTRATE AND ACETAMINOPHEN 1 TABLET: 5; 325 TABLET ORAL at 21:46

## 2020-06-12 RX ADMIN — MIRTAZAPINE 7.5 MG: 15 TABLET, FILM COATED ORAL at 21:45

## 2020-06-12 RX ADMIN — LORAZEPAM 1 MG: 1 TABLET ORAL at 22:58

## 2020-06-12 ASSESSMENT — PAIN DESCRIPTION - PAIN TYPE
TYPE: CHRONIC PAIN
TYPE_2: ACUTE PAIN

## 2020-06-12 ASSESSMENT — PAIN DESCRIPTION - DURATION: DURATION_2: CONTINUOUS

## 2020-06-12 ASSESSMENT — PAIN DESCRIPTION - DESCRIPTORS
DESCRIPTORS_2: HEADACHE
DESCRIPTORS: TINGLING

## 2020-06-12 ASSESSMENT — PAIN DESCRIPTION - ORIENTATION
ORIENTATION_2: ANTERIOR
ORIENTATION: RIGHT;LEFT

## 2020-06-12 ASSESSMENT — PAIN SCALES - GENERAL
PAINLEVEL_OUTOF10: 10
PAINLEVEL_OUTOF10: 0

## 2020-06-12 ASSESSMENT — PAIN DESCRIPTION - LOCATION
LOCATION_2: HEAD
LOCATION: FOOT

## 2020-06-12 ASSESSMENT — PAIN DESCRIPTION - PROGRESSION: CLINICAL_PROGRESSION: NOT CHANGED

## 2020-06-12 ASSESSMENT — PAIN DESCRIPTION - FREQUENCY: FREQUENCY: CONTINUOUS

## 2020-06-12 ASSESSMENT — PAIN DESCRIPTION - ONSET: ONSET: ON-GOING

## 2020-06-12 ASSESSMENT — PAIN DESCRIPTION - INTENSITY: RATING_2: 10

## 2020-06-12 NOTE — PLAN OF CARE
Problem: Falls - Risk of:  Goal: Will remain free from falls  Description: Will remain free from falls  Outcome: Met This Shift  Goal: Absence of physical injury  Description: Absence of physical injury  Outcome: Met This Shift     Problem: Pain:  Goal: Pain level will decrease  Description: Pain level will decrease  Outcome: Met This Shift  Goal: Control of acute pain  Description: Control of acute pain  Outcome: Met This Shift  Goal: Control of chronic pain  Description: Control of chronic pain  Outcome: Met This Shift  Goal: Patient's pain/discomfort is manageable  Description: Patient's pain/discomfort is manageable  Outcome: Met This Shift     Problem: Inadequate oral food/beverage intake (NI-2.1)  Goal: Food and/or Nutrient Delivery  Description: Individualized approach for food/nutrient provision. 6/12/2020 1101 by Orval Larry, RD, LD  Outcome: Met This Shift     Problem: Airway Clearance - Ineffective  Goal: Achieve or maintain patent airway  Outcome: Met This Shift     Problem: Gas Exchange - Impaired  Goal: Absence of hypoxia  Outcome: Met This Shift  Goal: Promote optimal lung function  Outcome: Met This Shift     Problem: Breathing Pattern - Ineffective  Goal: Ability to achieve and maintain a regular respiratory rate  Outcome: Met This Shift     Problem:  Body Temperature -  Risk of, Imbalanced  Goal: Ability to maintain a body temperature within defined limits  Outcome: Met This Shift

## 2020-06-12 NOTE — PROGRESS NOTES
11.2 2020    INR 1.0 2020     Last 3 Troponin:    Lab Results   Component Value Date    TROPONINI <0.01 2020     ABG:    Lab Results   Component Value Date    PH 7.404 2020    PCO2 35.2 2020    PO2 103.5 2020    HCO3 21.5 2020    BE -2.6 2020    O2SAT 97.9 2020     IRON:  No results found for: IRON  IMAGING  COVID - NOW  Xr Pelvis (1-2 Views)    Result Date: 6/3/2020  Patient MRN:  [id number] : 1970 Age: 44 years Gender: Female Order Date:  6/3/2020 1:15 PM EXAM: XR PELVIS (1-2 VIEWS) NUMBER OF IMAGES:   1  INDICATION:  TRAUMA TRAUMA COMPARISON: Britta 3, 2020 TECHNIQUE: AP view of the pelvis. FINDINGS: No fracture or dislocation seen. Bone mineralization is normal for age. Nonobstructed bowel gas pattern. Multiple pelvic phleboliths. No acute osseous abnormality seen. Ct Head Wo Contrast    Result Date: 6/3/2020  Patient MRN:  [id number] : 1970 Age: 44 years Gender: Female Order Date:  6/3/2020 1:00 PM EXAM: CT HEAD WO CONTRAST INDICATION:  Trauma Trauma  COMPARISON: None FINDINGS: PARENCHYMA:No mass effect, edema or hemorrhage. Moderate cerebral volume loss is seen with mild chronic microvascular ischemic changes. There is asymmetric enlargement of the left occipital horn with volume loss in the left occipital lobe. VENTRICLES: No evidence of hydrocephalus. Ex vacuo dilation of the left occipital horn. CALVARIUM:The calvarium is intact without fracture or focal lesion. SINUSES: Mild mucosal thickening is seen in the paranasal sinuses, especially the ethmoid and maxillary sinuses. Small air-fluid level in the left maxillary sinus. The mastoids are clear. 1. No skull fracture or acute intracranial abnormality. 2. Moderate volume loss in the cerebrum, the extent of which is greater than is typical for age. 3. Volume loss in the left occipital lobe, with ex vacuo dilation of the occipital horn.  Findings likely represent result of a chronic infarction, although no encephalomalacia is evident by CT. 4. Mild chronic mucosal thickening in the paranasal sinuses, with small air-fluid level in the left maxillary sinus. Clinical correlation for acute sinusitis recommended. Ct Cervical Spine Wo Contrast    Result Date: 6/3/2020  Patient MRN:  [id number] : 1970 Age: 44 years Gender: Female Order Date:  6/3/2020 1:00 PM EXAM: CT CERVICAL SPINE WO CONTRAST INDICATION:  trauma trauma COMPARISON: None Multiple CT sections were obtained with sagittal and coronal MPR reconstructions. FINDINGS: BONES: No fracture or joint dislocation. Vertebral body heights are preserved. No bony destructive lesion. DISC SPACES: Mild loss of disc height with small disc osteophyte complex at that C5-6 and C6-7 resulting in at least mild central canal stenoses. Mild-to-moderate neural foraminal stenoses at C6-7. Harvis Pock PARASPINAL SOFT TISSUES:Unremarkable. MISCELLANEOUS:No additional significant finding noted. 1. No fracture or joint dislocation. 2. Degenerative changes, as described. Ct Thoracic Spine Wo Contrast    Result Date: 6/3/2020  Patient MRN:  [id number] : 1970 Age: 44 years Gender: Female Order Date:  6/3/2020 1:00 PM EXAM: CT THORACIC SPINE WO CONTRAST INDICATION:  trauma trauma COMPARISON: None Multiple CT sections were obtained with sagittal and coronal MPR reconstructions. FINDINGS: BONES: No fracture or joint dislocation. Vertebral body heights are preserved. No bony destructive lesion. One Arch Adonay SPACES:Unremarkable. PARASPINAL SOFT TISSUES:Unremarkable. MISCELLANEOUS:No additional significant finding noted. No fracture or joint dislocation. Ct Lumbar Spine Wo Contrast    Result Date: 6/3/2020  Patient MRN: [id number] : 1970 Age:  44 years Gender: Female Order Date: 6/3/2020 1:00 PM Exam: CT LUMBAR SPINE WO CONTRAST Indication:   TRAUMA trauma Comparison: None. FINDINGS: No fracture or joint dislocation. The lumbar lordosis is preserved. Vertebral body heights are intact. No bony destructive lesion is seen. L1-2: Unremarkable. L2-3: Unremarkable. L3-4: Unremarkable. L4-5: Small disc bulge. Mild facet hypertrophy. No central canal or lateral recess stenosis. Mild neural foraminal stenoses. L5-S1: Mild loss of disc height with small disc bulge. Mild facet hypertrophy. No central canal or lateral recess stenosis. Mild neural foraminal stenoses. 1. No fracture or joint dislocation. 2. Small disc bulges resulting in mild neural foraminal stenoses at L4-5 and L5-S1. Xr Chest 1 Vw    Result Date: 6/3/2020  Patient MRN:  [id number] : 1970 Age: 44 years Gender: Female Order Date:  6/3/2020 1:15 PM EXAM: XR CHEST 1 VIEW NUMBER OF IMAGES:  1 INDICATION:  TRAUMA TRAUMA COMPARISON: None FINDINGS: Subtle hazy opacity seen in the right lung. The cardiomediastinal contour is unremarkable. No pneumothorax or pleural effusion. A pacer pad is seen along the right chest wall. Hazy opacity in the upper right lung, which may represent atelectasis, consolidation or contusion. Radiographic follow-up is recommended.       Medications:    Scheduled Meds:   levoFLOXacin  750 mg Oral Daily    docusate sodium  100 mg Oral BID    senna  1 tablet Oral Nightly    enoxaparin  40 mg Subcutaneous BID    sodium chloride flush  10 mL Intravenous 2 times per day    divalproex  500 mg Oral Daily    mirtazapine  7.5 mg Oral Nightly    sertraline  50 mg Oral Nightly    gabapentin  100 mg Oral TID    folic acid  1 mg Oral Daily    pantoprazole  40 mg Oral QAM AC    ferrous sulfate  325 mg Oral Daily with breakfast       Continuous Infusions:    PRN Meds:tiZANidine, HYDROcodone 5 mg - acetaminophen, perflutren lipid microspheres, sodium chloride flush, acetaminophen, polyethylene glycol, LORazepam    A/P:      Patient Active Problem List   Diagnosis    Trauma    Fall    Pt admitted with after fall and noted documentation of \"CLABSI\"  in Infectious Disease

## 2020-06-13 LAB
ANION GAP SERPL CALCULATED.3IONS-SCNC: 12 MMOL/L (ref 7–16)
BASOPHILS ABSOLUTE: 0.07 E9/L (ref 0–0.2)
BASOPHILS RELATIVE PERCENT: 1 % (ref 0–2)
BUN BLDV-MCNC: 22 MG/DL (ref 6–20)
CALCIUM SERPL-MCNC: 8.7 MG/DL (ref 8.6–10.2)
CHLORIDE BLD-SCNC: 103 MMOL/L (ref 98–107)
CO2: 25 MMOL/L (ref 22–29)
CREAT SERPL-MCNC: 0.9 MG/DL (ref 0.5–1)
CULTURE, BLOOD 2: NORMAL
EOSINOPHILS ABSOLUTE: 0.63 E9/L (ref 0.05–0.5)
EOSINOPHILS RELATIVE PERCENT: 8.7 % (ref 0–6)
GFR AFRICAN AMERICAN: >60
GFR NON-AFRICAN AMERICAN: >60 ML/MIN/1.73
GLUCOSE BLD-MCNC: 103 MG/DL (ref 74–99)
HCT VFR BLD CALC: 36.5 % (ref 34–48)
HEMOGLOBIN: 11.9 G/DL (ref 11.5–15.5)
IMMATURE GRANULOCYTES #: 0.02 E9/L
IMMATURE GRANULOCYTES %: 0.3 % (ref 0–5)
LYMPHOCYTES ABSOLUTE: 2.57 E9/L (ref 1.5–4)
LYMPHOCYTES RELATIVE PERCENT: 35.4 % (ref 20–42)
MCH RBC QN AUTO: 33.4 PG (ref 26–35)
MCHC RBC AUTO-ENTMCNC: 32.6 % (ref 32–34.5)
MCV RBC AUTO: 102.5 FL (ref 80–99.9)
MONOCYTES ABSOLUTE: 0.94 E9/L (ref 0.1–0.95)
MONOCYTES RELATIVE PERCENT: 12.9 % (ref 2–12)
NEUTROPHILS ABSOLUTE: 3.03 E9/L (ref 1.8–7.3)
NEUTROPHILS RELATIVE PERCENT: 41.7 % (ref 43–80)
PDW BLD-RTO: 12.8 FL (ref 11.5–15)
PLATELET # BLD: 191 E9/L (ref 130–450)
PMV BLD AUTO: 10.7 FL (ref 7–12)
POTASSIUM REFLEX MAGNESIUM: 4.1 MMOL/L (ref 3.5–5)
RBC # BLD: 3.56 E12/L (ref 3.5–5.5)
SARS-COV-2, NAAT: NOT DETECTED
SODIUM BLD-SCNC: 140 MMOL/L (ref 132–146)
WBC # BLD: 7.3 E9/L (ref 4.5–11.5)

## 2020-06-13 PROCEDURE — 6370000000 HC RX 637 (ALT 250 FOR IP): Performed by: INTERNAL MEDICINE

## 2020-06-13 PROCEDURE — 6360000002 HC RX W HCPCS: Performed by: STUDENT IN AN ORGANIZED HEALTH CARE EDUCATION/TRAINING PROGRAM

## 2020-06-13 PROCEDURE — 80048 BASIC METABOLIC PNL TOTAL CA: CPT

## 2020-06-13 PROCEDURE — 85025 COMPLETE CBC W/AUTO DIFF WBC: CPT

## 2020-06-13 PROCEDURE — U0002 COVID-19 LAB TEST NON-CDC: HCPCS

## 2020-06-13 PROCEDURE — 36415 COLL VENOUS BLD VENIPUNCTURE: CPT

## 2020-06-13 PROCEDURE — 6370000000 HC RX 637 (ALT 250 FOR IP): Performed by: STUDENT IN AN ORGANIZED HEALTH CARE EDUCATION/TRAINING PROGRAM

## 2020-06-13 PROCEDURE — 2140000000 HC CCU INTERMEDIATE R&B

## 2020-06-13 RX ADMIN — SERTRALINE 50 MG: 50 TABLET, FILM COATED ORAL at 21:21

## 2020-06-13 RX ADMIN — LEVOFLOXACIN 750 MG: 750 TABLET, FILM COATED ORAL at 09:05

## 2020-06-13 RX ADMIN — GABAPENTIN 100 MG: 100 CAPSULE ORAL at 21:20

## 2020-06-13 RX ADMIN — ENOXAPARIN SODIUM 40 MG: 40 INJECTION SUBCUTANEOUS at 09:04

## 2020-06-13 RX ADMIN — ENOXAPARIN SODIUM 40 MG: 40 INJECTION SUBCUTANEOUS at 21:21

## 2020-06-13 RX ADMIN — DIVALPROEX SODIUM 500 MG: 500 TABLET, EXTENDED RELEASE ORAL at 09:05

## 2020-06-13 RX ADMIN — PANTOPRAZOLE SODIUM 40 MG: 40 TABLET, DELAYED RELEASE ORAL at 09:05

## 2020-06-13 RX ADMIN — GABAPENTIN 100 MG: 100 CAPSULE ORAL at 13:10

## 2020-06-13 RX ADMIN — FOLIC ACID 1 MG: 1 TABLET ORAL at 09:05

## 2020-06-13 RX ADMIN — MIRTAZAPINE 7.5 MG: 15 TABLET, FILM COATED ORAL at 21:21

## 2020-06-13 RX ADMIN — HYDROCODONE BITARTRATE AND ACETAMINOPHEN 1 TABLET: 5; 325 TABLET ORAL at 16:38

## 2020-06-13 RX ADMIN — TIZANIDINE 4 MG: 4 TABLET ORAL at 09:05

## 2020-06-13 RX ADMIN — GABAPENTIN 100 MG: 100 CAPSULE ORAL at 09:05

## 2020-06-13 RX ADMIN — FERROUS SULFATE TAB 325 MG (65 MG ELEMENTAL FE) 325 MG: 325 (65 FE) TAB at 09:05

## 2020-06-13 RX ADMIN — DOCUSATE SODIUM 100 MG: 100 CAPSULE, LIQUID FILLED ORAL at 21:21

## 2020-06-13 RX ADMIN — LORAZEPAM 1 MG: 1 TABLET ORAL at 23:31

## 2020-06-13 RX ADMIN — TIZANIDINE 4 MG: 4 TABLET ORAL at 19:09

## 2020-06-13 ASSESSMENT — PAIN DESCRIPTION - DESCRIPTORS: DESCRIPTORS: ACHING;CONSTANT;TINGLING

## 2020-06-13 ASSESSMENT — PAIN DESCRIPTION - LOCATION
LOCATION: FOOT
LOCATION: FOOT

## 2020-06-13 ASSESSMENT — PAIN SCALES - GENERAL
PAINLEVEL_OUTOF10: 0
PAINLEVEL_OUTOF10: 8
PAINLEVEL_OUTOF10: 9
PAINLEVEL_OUTOF10: 10
PAINLEVEL_OUTOF10: 0
PAINLEVEL_OUTOF10: 10

## 2020-06-13 ASSESSMENT — PAIN DESCRIPTION - FREQUENCY: FREQUENCY: CONTINUOUS

## 2020-06-13 ASSESSMENT — PAIN - FUNCTIONAL ASSESSMENT: PAIN_FUNCTIONAL_ASSESSMENT: PREVENTS OR INTERFERES SOME ACTIVE ACTIVITIES AND ADLS

## 2020-06-13 ASSESSMENT — PAIN DESCRIPTION - ONSET: ONSET: ON-GOING

## 2020-06-13 ASSESSMENT — PAIN DESCRIPTION - PAIN TYPE
TYPE: CHRONIC PAIN
TYPE: CHRONIC PAIN

## 2020-06-13 ASSESSMENT — PAIN DESCRIPTION - PROGRESSION: CLINICAL_PROGRESSION: NOT CHANGED

## 2020-06-13 NOTE — PROGRESS NOTES
Subjective: The patient is awake and alert. No problems overnight. Denies chest pain, angina, and dyspnea. Denies abdominal pain. Tolerating diet. No nausea or vomiting. She is still c/o some pain in her neck. Objective:  Pt is aox3 in nad  /69   Pulse 56   Temp 97.1 °F (36.2 °C)   Resp 16   Ht 5' 5\" (1.651 m)   Wt 166 lb 7.2 oz (75.5 kg)   SpO2 96%   BMI 27.70 kg/m²   HEENT no adenopathy no bruits  C-collar in place  Heart:  RRR, no murmurs, gallops, or rubs.   Lungs:  CTA bilaterally, no wheeze, rales or rhonchi  Abd: bowel sounds present, nontender, nondistended, no masses  Extrem:  No clubbing, cyanosis, or edema  WBC/Hgb/Hct/Plts:  6.1/12.3/38.1/176 (06/12 6024) basic metabolic panel     Assessment:    Patient Active Problem List   Diagnosis    Trauma    Fall       Plan:    Cardiology not doing FLORINDA due to COVID  Echo done  Await ID orders      Jerrica Lockhart  6:38 AM  6/13/2020

## 2020-06-13 NOTE — PROGRESS NOTES
GENITOURINARY:  Neurogenic bladder   INTEGUMENT: denies wound , rash  HEMATOLOGIC/LYMPHATIC:  Denies lymph node swelling, gum bleeding or easy bruising. MUSCULOSKELETAL:  Back pain, foot pain   NEUROLOGICAL:  Weakness     Objective:    Vitals:    06/13/20 1100   BP: 107/62   Pulse: 78   Resp: 16   Temp: 98.3 °F (36.8 °C)   SpO2:      Constitutional: Alert, not in distress  HEENT : pallor +, cervical collar   Respiratory: Clear breath sounds, no crackles, no wheezes  Cardiovascular: Regular rate and rhythm, no murmurs  Gastrointestinal: Bowel sounds present, soft, nontender  Skin: Warm and dry, no active dermatoses  Musculoskeletal: No joint swelling, no joint erythema    Laboratory:  Lab Results   Component Value Date    WBC 7.3 06/13/2020    WBC 6.1 06/12/2020    WBC 5.9 06/11/2020    HGB 11.9 06/13/2020    HCT 36.5 06/13/2020    .5 (H) 06/13/2020     06/13/2020     Lab Results   Component Value Date    NEUTROABS 3.03 06/13/2020    NEUTROABS 2.29 06/12/2020    NEUTROABS 2.15 06/11/2020       Lab Results   Component Value Date    ALT 37 (H) 06/08/2020    AST 61 (H) 06/08/2020    ALKPHOS 49 06/08/2020    BILITOT <0.2 06/08/2020     Lab Results   Component Value Date     06/13/2020    K 4.1 06/13/2020     06/13/2020    CO2 25 06/13/2020    BUN 22 06/13/2020    CREATININE 0.9 06/13/2020    CREATININE 1.0 06/12/2020    CREATININE 1.2 06/11/2020    GFRAA >60 06/13/2020    LABGLOM >60 06/13/2020    GLUCOSE 103 06/13/2020    PROT 7.8 06/08/2020    LABALBU 4.6 06/08/2020    CALCIUM 8.7 06/13/2020    BILITOT <0.2 06/08/2020    ALKPHOS 49 06/08/2020    AST 61 06/08/2020    ALT 37 06/08/2020       Radiology: CXR (06/03):  Hazy opacity in the upper right lung, which may represent atelectasis, consolidation or contusion      Microbiology:   Culture, Blood 2 [[id number]] (Abnormal)  Collected: 06/06/20 1610   Order Status: Completed Specimen: Arm from Blood Updated: 06/11/20 1104    Organism Tip Updated: 06/08/20 0646    Organism Staphylococcus coagulase-negativeAbnormal     Culture Catheter Tip <15 colonies       Organism   Date Value Ref Range Status   06/06/2020 Staphylococcus coagulase-negative (A)  Final     Blood cx  No results found for: BLOODCULT1  Culture, Blood 2   Date Value Ref Range Status   06/08/2020 5 Days no growth  Final       TIP CULTURE  Culture Catheter Tip   Date Value Ref Range Status   06/06/2020 <15 colonies  Final       URINE CULTURE  Urine Culture, Routine   Date Value Ref Range Status   06/04/2020 <10,000 CFU/mL  Mixed gram positive organisms   (A)  Final   06/04/2020 >100,000 CFU/ml  Final       TTE -   Can not rule out mitral vegetation - possibly valve thickening as it only   appears in one view. Other valves are clean. Assessment:  Staphylococcus capitis and warneri , proteus bacteremia   CONS CLABSI       Recommendations:  Continue levofloxacin 750 mg q24h for now.   FLORINDA ( when COVID 19 neg )   COVID 19 NAAT ( 2nd )       Electronically signed by Sherwin Ricks MD on 6/13/2020 at 4:21 PM

## 2020-06-13 NOTE — PLAN OF CARE
Loneliness  Goal: Demonstrate positive use of time alone when socialization is not possible  Outcome: Met This Shift     Problem: Fatigue  Goal: Verbalize increase energy and improved vitality  Outcome: Met This Shift     Problem: Patient Education: Go to Patient Education Activity  Goal: Patient/Family Education  Outcome: Met This Shift     Problem: Infection:  Goal: Will remain free from infection  Description: Will remain free from infection  Outcome: Met This Shift     Problem: Safety:  Goal: Free from accidental physical injury  Description: Free from accidental physical injury  Outcome: Met This Shift  Goal: Free from intentional harm  Description: Free from intentional harm  Outcome: Met This Shift     Problem: Daily Care:  Goal: Daily care needs are met  Description: Daily care needs are met  Outcome: Met This Shift     Problem: Skin Integrity:  Goal: Skin integrity will stabilize  Description: Skin integrity will stabilize  Outcome: Met This Shift     Problem: Discharge Planning:  Goal: Patients continuum of care needs are met  Description: Patients continuum of care needs are met  Outcome: Met This Shift

## 2020-06-14 LAB
ANION GAP SERPL CALCULATED.3IONS-SCNC: 13 MMOL/L (ref 7–16)
BUN BLDV-MCNC: 21 MG/DL (ref 6–20)
CALCIUM SERPL-MCNC: 8.6 MG/DL (ref 8.6–10.2)
CHLORIDE BLD-SCNC: 103 MMOL/L (ref 98–107)
CO2: 24 MMOL/L (ref 22–29)
CREAT SERPL-MCNC: 0.9 MG/DL (ref 0.5–1)
GFR AFRICAN AMERICAN: >60
GFR NON-AFRICAN AMERICAN: >60 ML/MIN/1.73
GLUCOSE BLD-MCNC: 103 MG/DL (ref 74–99)
HCT VFR BLD CALC: 35.2 % (ref 34–48)
HEMOGLOBIN: 11.6 G/DL (ref 11.5–15.5)
MCH RBC QN AUTO: 33.6 PG (ref 26–35)
MCHC RBC AUTO-ENTMCNC: 33 % (ref 32–34.5)
MCV RBC AUTO: 102 FL (ref 80–99.9)
PDW BLD-RTO: 12.9 FL (ref 11.5–15)
PLATELET # BLD: 192 E9/L (ref 130–450)
PMV BLD AUTO: 11 FL (ref 7–12)
POTASSIUM REFLEX MAGNESIUM: 3.9 MMOL/L (ref 3.5–5)
RBC # BLD: 3.45 E12/L (ref 3.5–5.5)
SODIUM BLD-SCNC: 140 MMOL/L (ref 132–146)
WBC # BLD: 6.6 E9/L (ref 4.5–11.5)

## 2020-06-14 PROCEDURE — 6360000002 HC RX W HCPCS: Performed by: STUDENT IN AN ORGANIZED HEALTH CARE EDUCATION/TRAINING PROGRAM

## 2020-06-14 PROCEDURE — 2580000003 HC RX 258: Performed by: STUDENT IN AN ORGANIZED HEALTH CARE EDUCATION/TRAINING PROGRAM

## 2020-06-14 PROCEDURE — 2140000000 HC CCU INTERMEDIATE R&B

## 2020-06-14 PROCEDURE — 6370000000 HC RX 637 (ALT 250 FOR IP): Performed by: INTERNAL MEDICINE

## 2020-06-14 PROCEDURE — 6370000000 HC RX 637 (ALT 250 FOR IP): Performed by: STUDENT IN AN ORGANIZED HEALTH CARE EDUCATION/TRAINING PROGRAM

## 2020-06-14 PROCEDURE — 80048 BASIC METABOLIC PNL TOTAL CA: CPT

## 2020-06-14 PROCEDURE — 85027 COMPLETE CBC AUTOMATED: CPT

## 2020-06-14 PROCEDURE — 36415 COLL VENOUS BLD VENIPUNCTURE: CPT

## 2020-06-14 RX ADMIN — PANTOPRAZOLE SODIUM 40 MG: 40 TABLET, DELAYED RELEASE ORAL at 05:18

## 2020-06-14 RX ADMIN — HYDROCODONE BITARTRATE AND ACETAMINOPHEN 1 TABLET: 5; 325 TABLET ORAL at 09:02

## 2020-06-14 RX ADMIN — GABAPENTIN 100 MG: 100 CAPSULE ORAL at 08:49

## 2020-06-14 RX ADMIN — MIRTAZAPINE 7.5 MG: 15 TABLET, FILM COATED ORAL at 20:46

## 2020-06-14 RX ADMIN — HYDROCODONE BITARTRATE AND ACETAMINOPHEN 1 TABLET: 5; 325 TABLET ORAL at 20:45

## 2020-06-14 RX ADMIN — STANDARDIZED SENNA CONCENTRATE 8.6 MG: 8.6 TABLET ORAL at 20:46

## 2020-06-14 RX ADMIN — ENOXAPARIN SODIUM 40 MG: 40 INJECTION SUBCUTANEOUS at 08:49

## 2020-06-14 RX ADMIN — FOLIC ACID 1 MG: 1 TABLET ORAL at 08:49

## 2020-06-14 RX ADMIN — LEVOFLOXACIN 750 MG: 750 TABLET, FILM COATED ORAL at 08:49

## 2020-06-14 RX ADMIN — HYDROCODONE BITARTRATE AND ACETAMINOPHEN 1 TABLET: 5; 325 TABLET ORAL at 00:38

## 2020-06-14 RX ADMIN — SERTRALINE 50 MG: 50 TABLET, FILM COATED ORAL at 20:46

## 2020-06-14 RX ADMIN — DOCUSATE SODIUM 100 MG: 100 CAPSULE, LIQUID FILLED ORAL at 20:46

## 2020-06-14 RX ADMIN — ENOXAPARIN SODIUM 40 MG: 40 INJECTION SUBCUTANEOUS at 20:48

## 2020-06-14 RX ADMIN — GABAPENTIN 100 MG: 100 CAPSULE ORAL at 20:46

## 2020-06-14 RX ADMIN — GABAPENTIN 100 MG: 100 CAPSULE ORAL at 14:26

## 2020-06-14 RX ADMIN — TIZANIDINE 4 MG: 4 TABLET ORAL at 20:46

## 2020-06-14 RX ADMIN — FERROUS SULFATE TAB 325 MG (65 MG ELEMENTAL FE) 325 MG: 325 (65 FE) TAB at 08:48

## 2020-06-14 RX ADMIN — ACETAMINOPHEN 650 MG: 325 TABLET, FILM COATED ORAL at 14:26

## 2020-06-14 RX ADMIN — DOCUSATE SODIUM 100 MG: 100 CAPSULE, LIQUID FILLED ORAL at 08:48

## 2020-06-14 RX ADMIN — Medication 10 ML: at 08:48

## 2020-06-14 RX ADMIN — DIVALPROEX SODIUM 500 MG: 500 TABLET, EXTENDED RELEASE ORAL at 08:48

## 2020-06-14 ASSESSMENT — PAIN DESCRIPTION - PROGRESSION
CLINICAL_PROGRESSION: GRADUALLY WORSENING
CLINICAL_PROGRESSION: GRADUALLY WORSENING
CLINICAL_PROGRESSION: RAPIDLY IMPROVING

## 2020-06-14 ASSESSMENT — PAIN DESCRIPTION - PAIN TYPE
TYPE: ACUTE PAIN
TYPE: CHRONIC PAIN
TYPE: CHRONIC PAIN
TYPE_2: ACUTE PAIN
TYPE: ACUTE PAIN
TYPE: CHRONIC PAIN
TYPE: ACUTE PAIN

## 2020-06-14 ASSESSMENT — PAIN DESCRIPTION - FREQUENCY
FREQUENCY: CONTINUOUS
FREQUENCY: INTERMITTENT
FREQUENCY: CONTINUOUS
FREQUENCY: CONTINUOUS

## 2020-06-14 ASSESSMENT — PAIN DESCRIPTION - ONSET
ONSET: SUDDEN
ONSET: ON-GOING
ONSET: ON-GOING

## 2020-06-14 ASSESSMENT — PAIN DESCRIPTION - DESCRIPTORS
DESCRIPTORS: ACHING
DESCRIPTORS: ACHING
DESCRIPTORS: ACHING;CONSTANT;TINGLING
DESCRIPTORS_2: HEADACHE
DESCRIPTORS: ACHING
DESCRIPTORS: ACHING;CONSTANT;DISCOMFORT
DESCRIPTORS: ACHING;CONSTANT;SORE

## 2020-06-14 ASSESSMENT — PAIN DESCRIPTION - LOCATION
LOCATION: FOOT
LOCATION_2: HEAD
LOCATION: GENERALIZED
LOCATION: GENERALIZED
LOCATION: FOOT
LOCATION: FOOT
LOCATION: HEAD

## 2020-06-14 ASSESSMENT — PAIN SCALES - GENERAL
PAINLEVEL_OUTOF10: 0
PAINLEVEL_OUTOF10: 5
PAINLEVEL_OUTOF10: 3
PAINLEVEL_OUTOF10: 0
PAINLEVEL_OUTOF10: 10
PAINLEVEL_OUTOF10: 10
PAINLEVEL_OUTOF10: 6

## 2020-06-14 ASSESSMENT — PAIN DESCRIPTION - ORIENTATION
ORIENTATION: ANTERIOR
ORIENTATION: RIGHT;LEFT

## 2020-06-14 ASSESSMENT — PAIN DESCRIPTION - DURATION: DURATION_2: CONTINUOUS

## 2020-06-14 ASSESSMENT — PAIN DESCRIPTION - INTENSITY: RATING_2: 10

## 2020-06-14 NOTE — PLAN OF CARE
sodium, calcium, phosphorus, and pH  Outcome: Met This Shift     Problem: Loneliness or Risk for Loneliness  Goal: Demonstrate positive use of time alone when socialization is not possible  Outcome: Met This Shift     Problem: Fatigue  Goal: Verbalize increase energy and improved vitality  Outcome: Met This Shift     Problem: Patient Education: Go to Patient Education Activity  Goal: Patient/Family Education  Outcome: Met This Shift     Problem: Infection:  Goal: Will remain free from infection  Description: Will remain free from infection  Outcome: Met This Shift     Problem: Safety:  Goal: Free from accidental physical injury  Description: Free from accidental physical injury  Outcome: Met This Shift  Goal: Free from intentional harm  Description: Free from intentional harm  Outcome: Met This Shift     Problem: Daily Care:  Goal: Daily care needs are met  Description: Daily care needs are met  Outcome: Met This Shift     Problem: Skin Integrity:  Goal: Skin integrity will stabilize  Description: Skin integrity will stabilize  Outcome: Met This Shift     Problem: Discharge Planning:  Goal: Patients continuum of care needs are met  Description: Patients continuum of care needs are met  Outcome: Met This Shift

## 2020-06-14 NOTE — PROGRESS NOTES
GENITOURINARY:  Neurogenic bladder   INTEGUMENT: denies wound , rash  HEMATOLOGIC/LYMPHATIC:  Denies lymph node swelling, gum bleeding or easy bruising. MUSCULOSKELETAL:  Back pain, foot pain   NEUROLOGICAL:  Weakness     Objective:    Vitals:    06/14/20 0843   BP: 119/88   Pulse: 99   Resp: 16   Temp: 98.1 °F (36.7 °C)   SpO2: 95%     Constitutional: Alert, not in distress  HEENT : pallor +, cervical collar   Respiratory: Clear breath sounds, no crackles, no wheezes  Cardiovascular: Regular rate and rhythm, no murmurs  Gastrointestinal: Bowel sounds present, soft, nontender  Skin: Warm and dry, no active dermatoses  Musculoskeletal: No joint swelling, no joint erythema    Laboratory:  Lab Results   Component Value Date    WBC 6.6 06/14/2020    WBC 7.3 06/13/2020    WBC 6.1 06/12/2020    HGB 11.6 06/14/2020    HCT 35.2 06/14/2020    .0 (H) 06/14/2020     06/14/2020     Lab Results   Component Value Date    NEUTROABS 3.03 06/13/2020    NEUTROABS 2.29 06/12/2020    NEUTROABS 2.15 06/11/2020       Lab Results   Component Value Date    ALT 37 (H) 06/08/2020    AST 61 (H) 06/08/2020    ALKPHOS 49 06/08/2020    BILITOT <0.2 06/08/2020     Lab Results   Component Value Date     06/14/2020    K 3.9 06/14/2020     06/14/2020    CO2 24 06/14/2020    BUN 21 06/14/2020    CREATININE 0.9 06/14/2020    CREATININE 0.9 06/13/2020    CREATININE 1.0 06/12/2020    GFRAA >60 06/14/2020    LABGLOM >60 06/14/2020    GLUCOSE 103 06/14/2020    PROT 7.8 06/08/2020    LABALBU 4.6 06/08/2020    CALCIUM 8.6 06/14/2020    BILITOT <0.2 06/08/2020    ALKPHOS 49 06/08/2020    AST 61 06/08/2020    ALT 37 06/08/2020       Radiology: CXR (06/03):  Hazy opacity in the upper right lung, which may represent atelectasis, consolidation or contusion      Microbiology:   Culture, Blood 2 [[id number]] (Abnormal)  Collected: 06/06/20 1610   Order Status: Completed Specimen: Arm from Blood Updated: 06/11/20 1104    Organism Staphylococcus auricularisAbnormal      Staphylococcus hominis ssp hominisAbnormal    Narrative:     Source: BLOOD       Site: Arm&Arm          Previous panic on this admission - call not   needed per SOP, 06/07/2020 13:34,  by [facility]   Culture, Blood 1 [[id number]] (Abnormal)  Collected: 06/06/20 1610   Order Status: Completed Specimen: Arm from Blood Updated: 06/10/20 1015    Blood Culture, Routine Previously positive blood culture calledAbnormal     Organism Staphylococcus epidermidisAbnormal      Staphylococcus warneriAbnormal    Narrative:     Source: BLOOD       Site: Arm&Arm             Culture, Blood 2 [[id number]] Collected: 06/08/20 0950   Order Status: Completed Specimen: Blood Updated: 06/09/20 1135    Culture, Blood 2 24 Hours no growth   Narrative:     Source: BLOOD       Site: Blood&Blood             Culture, Blood 1 [[id number]] Collected: 06/07/20 1640   Order Status: Completed Specimen: Blood Updated: 06/08/20 1835    Blood Culture, Routine 24 Hours no growth   Narrative:     Source: BLOOD       Site: Blood&Blood             Culture, Blood 1 [[id number]] (Abnormal)  Collected: 06/04/20 1615   Order Status: Completed Specimen: Blood Updated: 06/08/20 1136    Blood Culture, Routine Previously positive blood culture calledAbnormal     Organism Staphylococcus capitisAbnormal    Narrative:     Source: BLOOD       Site:           Previous panic on this admission - call not needed per   SOP, 06/05/2020 13:41,  by [facility]   Culture, Blood 2 [[id number]] (Abnormal)  Collected: 06/04/20 1615   Order Status: Completed Specimen: Blood Updated: 06/08/20 1135    Organism Staphylococcus warneriAbnormal      Proteus mirabilisAbnormal      Staphylococcus capitisAbnormal    Narrative:     Source: BLOOD       Site:           Microbiology results called to and read back by Virgen Tobar RN, 06/05/2020  08:24, by Evens Buckner   Culture, Tip [[id number]] (Abnormal) Collected: 06/06/20 1630   Order Status: Completed Specimen: Catheter Tip Updated: 06/08/20 0646    Organism Staphylococcus coagulase-negativeAbnormal     Culture Catheter Tip <15 colonies       Organism   Date Value Ref Range Status   06/06/2020 Staphylococcus coagulase-negative (A)  Final     Blood cx  No results found for: BLOODCULT1  Culture, Blood 2   Date Value Ref Range Status   06/08/2020 5 Days no growth  Final       TIP CULTURE  Culture Catheter Tip   Date Value Ref Range Status   06/06/2020 <15 colonies  Final       URINE CULTURE  Urine Culture, Routine   Date Value Ref Range Status   06/04/2020 <10,000 CFU/mL  Mixed gram positive organisms   (A)  Final   06/04/2020 >100,000 CFU/ml  Final       COVID 19 neg on 6/11, 6/13       TTE -   Can not rule out mitral vegetation - possibly valve thickening as it only   appears in one view. Other valves are clean. Assessment:    Staphylococcus capitis and warneri , proteus bacteremia   CONS CLABSI       Recommendations:    Continue levofloxacin 750 mg q24h for now.   FLORINDA   Discontinue isolation     Electronically signed by Liza Villarreal MD on 6/14/2020 at 9:55 AM

## 2020-06-15 ENCOUNTER — ANESTHESIA (OUTPATIENT)
Dept: CARDIAC CATH/INVASIVE PROCEDURES | Age: 50
DRG: 201 | End: 2020-06-15
Payer: MEDICAID

## 2020-06-15 ENCOUNTER — ANESTHESIA EVENT (OUTPATIENT)
Dept: CARDIAC CATH/INVASIVE PROCEDURES | Age: 50
DRG: 201 | End: 2020-06-15
Payer: MEDICAID

## 2020-06-15 VITALS
SYSTOLIC BLOOD PRESSURE: 118 MMHG | HEIGHT: 65 IN | HEART RATE: 78 BPM | OXYGEN SATURATION: 97 % | WEIGHT: 166.45 LBS | TEMPERATURE: 98.7 F | BODY MASS INDEX: 27.73 KG/M2 | RESPIRATION RATE: 18 BRPM | DIASTOLIC BLOOD PRESSURE: 82 MMHG

## 2020-06-15 VITALS
RESPIRATION RATE: 19 BRPM | OXYGEN SATURATION: 98 % | SYSTOLIC BLOOD PRESSURE: 117 MMHG | DIASTOLIC BLOOD PRESSURE: 86 MMHG

## 2020-06-15 LAB
ANION GAP SERPL CALCULATED.3IONS-SCNC: 10 MMOL/L (ref 7–16)
BILIRUBIN URINE: NEGATIVE
BLOOD, URINE: NEGATIVE
BUN BLDV-MCNC: 25 MG/DL (ref 6–20)
CALCIUM SERPL-MCNC: 8.5 MG/DL (ref 8.6–10.2)
CHLORIDE BLD-SCNC: 103 MMOL/L (ref 98–107)
CLARITY: CLEAR
CO2: 26 MMOL/L (ref 22–29)
COLOR: YELLOW
CREAT SERPL-MCNC: 0.9 MG/DL (ref 0.5–1)
GFR AFRICAN AMERICAN: >60
GFR NON-AFRICAN AMERICAN: >60 ML/MIN/1.73
GLUCOSE BLD-MCNC: 103 MG/DL (ref 74–99)
GLUCOSE URINE: NEGATIVE MG/DL
HCT VFR BLD CALC: 34.4 % (ref 34–48)
HEMOGLOBIN: 11 G/DL (ref 11.5–15.5)
KETONES, URINE: NEGATIVE MG/DL
LEUKOCYTE ESTERASE, URINE: NEGATIVE
MCH RBC QN AUTO: 33.1 PG (ref 26–35)
MCHC RBC AUTO-ENTMCNC: 32 % (ref 32–34.5)
MCV RBC AUTO: 103.6 FL (ref 80–99.9)
NITRITE, URINE: NEGATIVE
PDW BLD-RTO: 13.1 FL (ref 11.5–15)
PH UA: 7 (ref 5–9)
PLATELET # BLD: 208 E9/L (ref 130–450)
PMV BLD AUTO: 10.8 FL (ref 7–12)
POTASSIUM REFLEX MAGNESIUM: 4.4 MMOL/L (ref 3.5–5)
PROTEIN UA: NEGATIVE MG/DL
RBC # BLD: 3.32 E12/L (ref 3.5–5.5)
SODIUM BLD-SCNC: 139 MMOL/L (ref 132–146)
SPECIFIC GRAVITY UA: 1.02 (ref 1–1.03)
UROBILINOGEN, URINE: 0.2 E.U./DL
WBC # BLD: 7.2 E9/L (ref 4.5–11.5)

## 2020-06-15 PROCEDURE — 6360000002 HC RX W HCPCS: Performed by: NURSE ANESTHETIST, CERTIFIED REGISTERED

## 2020-06-15 PROCEDURE — 2580000003 HC RX 258: Performed by: NURSE ANESTHETIST, CERTIFIED REGISTERED

## 2020-06-15 PROCEDURE — 6370000000 HC RX 637 (ALT 250 FOR IP): Performed by: INTERNAL MEDICINE

## 2020-06-15 PROCEDURE — 93312 ECHO TRANSESOPHAGEAL: CPT

## 2020-06-15 PROCEDURE — 36415 COLL VENOUS BLD VENIPUNCTURE: CPT

## 2020-06-15 PROCEDURE — 6360000002 HC RX W HCPCS: Performed by: STUDENT IN AN ORGANIZED HEALTH CARE EDUCATION/TRAINING PROGRAM

## 2020-06-15 PROCEDURE — 2709999900 HC NON-CHARGEABLE SUPPLY

## 2020-06-15 PROCEDURE — 93325 DOPPLER ECHO COLOR FLOW MAPG: CPT

## 2020-06-15 PROCEDURE — 6370000000 HC RX 637 (ALT 250 FOR IP): Performed by: STUDENT IN AN ORGANIZED HEALTH CARE EDUCATION/TRAINING PROGRAM

## 2020-06-15 PROCEDURE — 93321 DOPPLER ECHO F-UP/LMTD STD: CPT

## 2020-06-15 PROCEDURE — 2580000003 HC RX 258: Performed by: STUDENT IN AN ORGANIZED HEALTH CARE EDUCATION/TRAINING PROGRAM

## 2020-06-15 PROCEDURE — 85027 COMPLETE CBC AUTOMATED: CPT

## 2020-06-15 PROCEDURE — 80048 BASIC METABOLIC PNL TOTAL CA: CPT

## 2020-06-15 PROCEDURE — 81003 URINALYSIS AUTO W/O SCOPE: CPT

## 2020-06-15 PROCEDURE — B246ZZ4 ULTRASONOGRAPHY OF RIGHT AND LEFT HEART, TRANSESOPHAGEAL: ICD-10-PCS | Performed by: INTERNAL MEDICINE

## 2020-06-15 RX ORDER — MIRTAZAPINE 7.5 MG/1
7.5 TABLET, FILM COATED ORAL NIGHTLY
Qty: 30 TABLET | Refills: 3 | Status: SHIPPED | OUTPATIENT
Start: 2020-06-15

## 2020-06-15 RX ORDER — PSEUDOEPHEDRINE HCL 30 MG
100 TABLET ORAL 2 TIMES DAILY
COMMUNITY
Start: 2020-06-15

## 2020-06-15 RX ORDER — HYDROCODONE BITARTRATE AND ACETAMINOPHEN 5; 325 MG/1; MG/1
1 TABLET ORAL EVERY 6 HOURS PRN
Qty: 3 TABLET | Refills: 0
Start: 2020-06-15 | End: 2020-06-18

## 2020-06-15 RX ORDER — DIVALPROEX SODIUM 500 MG/1
500 TABLET, EXTENDED RELEASE ORAL DAILY
Qty: 30 TABLET | Refills: 3 | Status: SHIPPED | OUTPATIENT
Start: 2020-06-16

## 2020-06-15 RX ORDER — GABAPENTIN 300 MG/1
300 CAPSULE ORAL 3 TIMES DAILY
Qty: 9 CAPSULE | Refills: 0
Start: 2020-06-15 | End: 2020-06-18

## 2020-06-15 RX ORDER — FOLIC ACID 1 MG/1
1 TABLET ORAL DAILY
Qty: 30 TABLET | Refills: 3 | Status: SHIPPED | OUTPATIENT
Start: 2020-06-16

## 2020-06-15 RX ORDER — GABAPENTIN 300 MG/1
300 CAPSULE ORAL 3 TIMES DAILY
Status: DISCONTINUED | OUTPATIENT
Start: 2020-06-15 | End: 2020-06-16 | Stop reason: HOSPADM

## 2020-06-15 RX ORDER — POLYETHYLENE GLYCOL 3350 17 G/17G
17 POWDER, FOR SOLUTION ORAL DAILY PRN
Qty: 527 G | Refills: 1 | Status: SHIPPED | OUTPATIENT
Start: 2020-06-15 | End: 2020-07-15

## 2020-06-15 RX ORDER — PANTOPRAZOLE SODIUM 40 MG/1
40 TABLET, DELAYED RELEASE ORAL
Qty: 30 TABLET | Refills: 3 | Status: SHIPPED | OUTPATIENT
Start: 2020-06-16

## 2020-06-15 RX ORDER — PROPOFOL 10 MG/ML
INJECTION, EMULSION INTRAVENOUS PRN
Status: DISCONTINUED | OUTPATIENT
Start: 2020-06-15 | End: 2020-06-15 | Stop reason: SDUPTHER

## 2020-06-15 RX ORDER — LEVOFLOXACIN 750 MG/1
750 TABLET ORAL DAILY
Qty: 6 TABLET | Refills: 0
Start: 2020-06-16 | End: 2020-06-22

## 2020-06-15 RX ORDER — FERROUS SULFATE 325(65) MG
325 TABLET ORAL
Qty: 30 TABLET | Refills: 3 | Status: SHIPPED | OUTPATIENT
Start: 2020-06-16

## 2020-06-15 RX ORDER — LORAZEPAM 1 MG/1
1 TABLET ORAL NIGHTLY PRN
Qty: 2 TABLET | Refills: 0
Start: 2020-06-15 | End: 2020-07-15

## 2020-06-15 RX ORDER — SODIUM CHLORIDE 9 MG/ML
INJECTION, SOLUTION INTRAVENOUS CONTINUOUS PRN
Status: DISCONTINUED | OUTPATIENT
Start: 2020-06-15 | End: 2020-06-15 | Stop reason: SDUPTHER

## 2020-06-15 RX ORDER — SENNA PLUS 8.6 MG/1
1 TABLET ORAL NIGHTLY
Qty: 30 TABLET | Refills: 0 | Status: SHIPPED | OUTPATIENT
Start: 2020-06-15 | End: 2020-07-15

## 2020-06-15 RX ORDER — TIZANIDINE 4 MG/1
4 TABLET ORAL EVERY 6 HOURS PRN
Qty: 12 TABLET | Refills: 0 | COMMUNITY
Start: 2020-06-15

## 2020-06-15 RX ADMIN — Medication 10 ML: at 20:20

## 2020-06-15 RX ADMIN — HYDROCODONE BITARTRATE AND ACETAMINOPHEN 1 TABLET: 5; 325 TABLET ORAL at 04:30

## 2020-06-15 RX ADMIN — PROPOFOL 90 MG: 10 INJECTION, EMULSION INTRAVENOUS at 13:55

## 2020-06-15 RX ADMIN — SODIUM CHLORIDE: 9 INJECTION, SOLUTION INTRAVENOUS at 13:43

## 2020-06-15 RX ADMIN — GABAPENTIN 300 MG: 300 CAPSULE ORAL at 15:20

## 2020-06-15 RX ADMIN — MIRTAZAPINE 7.5 MG: 15 TABLET, FILM COATED ORAL at 20:15

## 2020-06-15 RX ADMIN — DIVALPROEX SODIUM 500 MG: 500 TABLET, EXTENDED RELEASE ORAL at 15:21

## 2020-06-15 RX ADMIN — FOLIC ACID 1 MG: 1 TABLET ORAL at 15:20

## 2020-06-15 RX ADMIN — FERROUS SULFATE TAB 325 MG (65 MG ELEMENTAL FE) 325 MG: 325 (65 FE) TAB at 15:20

## 2020-06-15 RX ADMIN — SERTRALINE 50 MG: 50 TABLET, FILM COATED ORAL at 20:16

## 2020-06-15 RX ADMIN — GABAPENTIN 300 MG: 300 CAPSULE ORAL at 20:16

## 2020-06-15 RX ADMIN — HYDROCODONE BITARTRATE AND ACETAMINOPHEN 1 TABLET: 5; 325 TABLET ORAL at 15:20

## 2020-06-15 RX ADMIN — PANTOPRAZOLE SODIUM 40 MG: 40 TABLET, DELAYED RELEASE ORAL at 06:24

## 2020-06-15 RX ADMIN — LORAZEPAM 1 MG: 1 TABLET ORAL at 21:38

## 2020-06-15 RX ADMIN — DOCUSATE SODIUM 100 MG: 100 CAPSULE, LIQUID FILLED ORAL at 15:20

## 2020-06-15 RX ADMIN — Medication 10 ML: at 11:00

## 2020-06-15 RX ADMIN — LEVOFLOXACIN 750 MG: 750 TABLET, FILM COATED ORAL at 15:20

## 2020-06-15 RX ADMIN — DOCUSATE SODIUM 100 MG: 100 CAPSULE, LIQUID FILLED ORAL at 20:16

## 2020-06-15 RX ADMIN — HYDROCODONE BITARTRATE AND ACETAMINOPHEN 1 TABLET: 5; 325 TABLET ORAL at 21:30

## 2020-06-15 RX ADMIN — ACETAMINOPHEN 650 MG: 325 TABLET, FILM COATED ORAL at 09:43

## 2020-06-15 RX ADMIN — STANDARDIZED SENNA CONCENTRATE 8.6 MG: 8.6 TABLET ORAL at 20:16

## 2020-06-15 RX ADMIN — ENOXAPARIN SODIUM 40 MG: 40 INJECTION SUBCUTANEOUS at 20:16

## 2020-06-15 ASSESSMENT — PAIN DESCRIPTION - ORIENTATION
ORIENTATION: RIGHT;LEFT
ORIENTATION_2: LEFT;RIGHT
ORIENTATION: RIGHT;LEFT
ORIENTATION: RIGHT;LEFT
ORIENTATION: OTHER (COMMENT)

## 2020-06-15 ASSESSMENT — PAIN DESCRIPTION - INTENSITY
RATING_2: 9
RATING_2: 0

## 2020-06-15 ASSESSMENT — PAIN SCALES - GENERAL
PAINLEVEL_OUTOF10: 10
PAINLEVEL_OUTOF10: 10
PAINLEVEL_OUTOF10: 0
PAINLEVEL_OUTOF10: 9
PAINLEVEL_OUTOF10: 10
PAINLEVEL_OUTOF10: 10
PAINLEVEL_OUTOF10: 0
PAINLEVEL_OUTOF10: 3
PAINLEVEL_OUTOF10: 9

## 2020-06-15 ASSESSMENT — PAIN DESCRIPTION - PROGRESSION
CLINICAL_PROGRESSION: GRADUALLY WORSENING
CLINICAL_PROGRESSION: NOT CHANGED
CLINICAL_PROGRESSION: GRADUALLY WORSENING

## 2020-06-15 ASSESSMENT — PAIN DESCRIPTION - LOCATION
LOCATION: FOOT
LOCATION_2: HEAD
LOCATION_2: FOOT
LOCATION: FOOT
LOCATION: FOOT
LOCATION: HEAD

## 2020-06-15 ASSESSMENT — PAIN DESCRIPTION - DESCRIPTORS
DESCRIPTORS: ACHING
DESCRIPTORS_2: HEADACHE
DESCRIPTORS: ACHING
DESCRIPTORS: ACHING;DISCOMFORT
DESCRIPTORS: ACHING;DISCOMFORT;SORE

## 2020-06-15 ASSESSMENT — PAIN DESCRIPTION - DURATION: DURATION_2: CONTINUOUS

## 2020-06-15 ASSESSMENT — PAIN DESCRIPTION - PAIN TYPE
TYPE: CHRONIC PAIN
TYPE: CHRONIC PAIN
TYPE_2: CHRONIC PAIN
TYPE: CHRONIC PAIN
TYPE: CHRONIC PAIN
TYPE_2: ACUTE PAIN

## 2020-06-15 ASSESSMENT — PAIN DESCRIPTION - ONSET
ONSET: ON-GOING

## 2020-06-15 ASSESSMENT — PAIN DESCRIPTION - FREQUENCY
FREQUENCY: CONTINUOUS

## 2020-06-15 NOTE — CARE COORDINATION
Care Coordination:  Notified by Rn that Lizzie Jordan was negative and okay to dc today. I have called [facility] for return, I have been connected to  serena hodge, awaiting return call. In meantime, I have set up pt transport with [facility] for 1030 pm . I have called [facility] back and spoke to [facility] who will need to clear with DON for return today and she will call me back within the next 5-10 minutes to confirm    Carrie Reynolds    Addendum: return call from [address] [place] [address] from [facility] who spoke to [facility] and okay for dc today.   Unit notified    Carrie Reynolds

## 2020-06-15 NOTE — PLAN OF CARE
Problem: Falls - Risk of:  Goal: Will remain free from falls  Outcome: Met This Shift     Problem: Pain:  Goal: Pain level will decrease  Outcome: Met This Shift     Problem: Airway Clearance - Ineffective  Goal: Achieve or maintain patent airway  Outcome: Met This Shift     Problem: Gas Exchange - Impaired  Goal: Absence of hypoxia  Outcome: Met This Shift     Problem: Breathing Pattern - Ineffective  Goal: Ability to achieve and maintain a regular respiratory rate  Outcome: Met This Shift     Problem:  Body Temperature -  Risk of, Imbalanced  Goal: Ability to maintain a body temperature within defined limits  Outcome: Met This Shift     Problem: Isolation Precautions - Risk of Spread of Infection  Goal: Prevent transmission of infection  Outcome: Met This Shift     Problem: Risk for Fluid Volume Deficit  Goal: Maintain normal heart rhythm  Outcome: Met This Shift     Problem: Infection:  Goal: Will remain free from infection  Outcome: Met This Shift     Problem: Safety:  Goal: Free from accidental physical injury  Outcome: Met This Shift     Problem: Daily Care:  Goal: Daily care needs are met  Outcome: Met This Shift     Problem: Skin Integrity:  Goal: Skin integrity will stabilize  Outcome: Met This Shift

## 2020-06-15 NOTE — PLAN OF CARE
- Risk of Spread of Infection  Goal: Prevent transmission of infection  6/15/2020 1052 by Alessandra Leone RN  Outcome: Ongoing  6/15/2020 0437 by Whit Olea RN  Outcome: Met This Shift     Problem: Nutrition Deficits  Goal: Optimize nutrtional status  Outcome: Ongoing     Problem: Risk for Fluid Volume Deficit  Goal: Maintain normal heart rhythm  6/15/2020 1052 by Alessandra Leone RN  Outcome: Ongoing  6/15/2020 0437 by Whit Olea RN  Outcome: Met This Shift  Goal: Maintain absence of muscle cramping  Outcome: Ongoing  Goal: Maintain normal serum potassium, sodium, calcium, phosphorus, and pH  Outcome: Ongoing     Problem: Loneliness or Risk for Loneliness  Goal: Demonstrate positive use of time alone when socialization is not possible  6/15/2020 1052 by Alessandra Leone RN  Outcome: Ongoing  6/15/2020 0437 by Whit Olea RN  Outcome: Not Met This Shift     Problem: Fatigue  Goal: Verbalize increase energy and improved vitality  6/15/2020 1052 by Alessandra Leone RN  Outcome: Ongoing  6/15/2020 0437 by Whit Olea RN  Outcome: Not Met This Shift     Problem: Patient Education: Go to Patient Education Activity  Goal: Patient/Family Education  Outcome: Ongoing     Problem: Infection:  Goal: Will remain free from infection  Description: Will remain free from infection  6/15/2020 1052 by Alessandra Leone RN  Outcome: Ongoing  6/15/2020 0437 by Whit Olea RN  Outcome: Met This Shift     Problem: Safety:  Goal: Free from accidental physical injury  Description: Free from accidental physical injury  6/15/2020 1052 by Alessandra Leone RN  Outcome: Ongoing  6/15/2020 0437 by Whit Olea RN  Outcome: Met This Shift  Goal: Free from intentional harm  Description: Free from intentional harm  Outcome: Ongoing     Problem: Daily Care:  Goal: Daily care needs are met  Description: Daily care needs are met  6/15/2020 1052 by Alessandra Leone RN  Outcome: Ongoing  6/15/2020 0437 by Whit Olea RN  Outcome: Met This

## 2020-06-15 NOTE — PROGRESS NOTES
DRAGAN PROGRESS NOTE      Chief complaint: Follow-up of CoNS and Proteus mirabilis CLABSI    The patient is a 44 y.o. female with history of stroke, bipolar disorder, hypertension, hyperlipidemia, on nitrofurantoin at the nursing home, presented on 06/03 after fall from syncope, found to be bradycardic. She is known to have tested positive for COVID-19. On admission, she was afebrile and hemodynamically stable with no leukocytosis. Urinalysis showed insignificant pyuria with urine culture growing more than 100,000 CFU per mL gram-negative rods. Blood cultures showed Staphylococcus capitis and warneri (susceptible to methicillin, cotrimoxazole and levofloxacin) and Proteus mirabilis (non-ESBL; susceptible to cotrimoxazole and levofloxacin). Urine culture showed <10,000 CFU/mL of mixed Gram-positive organisms and >100,000 CFU/mL of Proteus mirabilis. Right femoral TLC was removed on 06/06. Catheter tip culture grew <15 colonies of CoNS. Blood cultures from 06/06 showed Staphylococcus auricularis and hominis in 1 set then Staphylococcus warneri and epidermidis on the other set. Ceftriaxone was started on 06/05. She has been pulling out her IV lines. Ceftriaxone was switched to cotrimoxazole on 06/06 then to levofloxacin on 06/08 for better oral bioavailability. SARS-CoV-2 PCR test was not detected on 06/11 and 06/14. FLORINDA showed no evidence of endocarditis. Subjective: Patient was seen and examined. No chills, no abdominal pain, no diarrhea, no rash, no itching.       Objective:    Vitals:    06/15/20 0930   BP: 128/85   Pulse: 65   Resp: 16   Temp: 98.6 °F (37 °C)   SpO2: 97%     Constitutional: Alert, not in distress  Respiratory: Clear breath sounds, no crackles, no wheezes  Cardiovascular: Regular rate and rhythm, no murmurs  Gastrointestinal: Bowel sounds present, soft, nontender  Genitourinary: External genitalia grossly normal with no ulcers, no discharge, no malodor noted  Skin: Warm and dry, no active

## 2020-06-15 NOTE — DISCHARGE SUMMARY
mineralization is normal for age. Nonobstructed bowel gas pattern. Multiple pelvic phleboliths. No acute osseous abnormality seen. Ct Head Wo Contrast    Result Date: 6/3/2020  Patient MRN:  [id number] : 1970 Age: 44 years Gender: Female Order Date:  6/3/2020 1:00 PM EXAM: CT HEAD WO CONTRAST INDICATION:  Trauma Trauma  COMPARISON: None FINDINGS: PARENCHYMA:No mass effect, edema or hemorrhage. Moderate cerebral volume loss is seen with mild chronic microvascular ischemic changes. There is asymmetric enlargement of the left occipital horn with volume loss in the left occipital lobe. VENTRICLES: No evidence of hydrocephalus. Ex vacuo dilation of the left occipital horn. CALVARIUM:The calvarium is intact without fracture or focal lesion. SINUSES: Mild mucosal thickening is seen in the paranasal sinuses, especially the ethmoid and maxillary sinuses. Small air-fluid level in the left maxillary sinus. The mastoids are clear. 1. No skull fracture or acute intracranial abnormality. 2. Moderate volume loss in the cerebrum, the extent of which is greater than is typical for age. 3. Volume loss in the left occipital lobe, with ex vacuo dilation of the occipital horn. Findings likely represent result of a chronic infarction, although no encephalomalacia is evident by CT. 4. Mild chronic mucosal thickening in the paranasal sinuses, with small air-fluid level in the left maxillary sinus. Clinical correlation for acute sinusitis recommended. Ct Cervical Spine Wo Contrast    Result Date: 6/3/2020  Patient MRN:  [id number] : 1970 Age: 44 years Gender: Female Order Date:  6/3/2020 1:00 PM EXAM: CT CERVICAL SPINE WO CONTRAST INDICATION:  trauma trauma COMPARISON: None Multiple CT sections were obtained with sagittal and coronal MPR reconstructions. FINDINGS: BONES: No fracture or joint dislocation. Vertebral body heights are preserved. No bony destructive lesion.  DISC SPACES: Mild loss of disc height with

## 2020-06-15 NOTE — ANESTHESIA PRE PROCEDURE
psychiatric history (bipolar): stable with treatmentdepression/anxiety              ROS comment: H/o cocaine use    In C-collar; cervical x-ray negative for fx GI/Hepatic/Renal:   (+) GERD: well controlled,           Endo/Other:    (+) blood dyscrasia (Lovenox): anticoagulation therapy:., .            C-spine cleared    Pt had no PAT visit       Abdominal:           Vascular:                                        Anesthesia Plan      MAC     ASA 3       Induction: intravenous. Anesthetic plan and risks discussed with patient.                       RON Christopher - CRNA   6/15/2020

## 2020-06-24 NOTE — CONSULTS
[address]                  [address] [facility]  [facility]                                  CONSULTATION    PATIENT NAME: Morena Mejia                :        1970  MED REC NO:   [id number]                            ROOM:  ACCOUNT NO:   [de-identified]                           ADMIT DATE: 2020  PROVIDER:     Arleen French DPM    CONSULT DATE:  6/15/2020    HISTORY OF PRESENT ILLNESS:  I was asked to see her today for  consultation in regards to neuropathic changes of her feet and painful  mycotic ingrown nails. Her PMH and ROS are well-documented in the H and  P. She is admitted for non-related foot problem. I reviewed her meds,  reviewed her labs and her previous physician consultation, history and  physical.  She complains of painful mycotic ingrown nails and has a  chronic history of nail infection. REVIEW OF SYSTEMS:  Her review of system is otherwise unchanged and  noncontributory. PHYSICAL EXAMINATION:  EXTREMITIES:  Her exam is consistent with mycotic nails. There are  signs of peripheral neuropathy. There are no breaks in the skin. No  cellulitis and no acute infection signs. Rest of exam is unremarkable  and noncontributory. There are no range of motion abnormalities. There  are no signs of any infection. ASSESSMENT:  Neurological foot pain with mycotic ingrown nails. PLAN:  We will exam today. Continue to use the gabapentin that she is  noted for neuropathic pain. I debrided all ten of the nails in length  and thickness by mechanical means. I was able to remove the ingrown  nails without any problem. I recommend no oral antifungals at this time  for her infection. I thank Dr. Lm Allan and  _____ for consulting me in regards to this  patient.         Shashi Vivas DPM    D: 2020 6:48:57       T: 2020 8:36:57     [email or web address]  Job#: [id number]     Doc#: [id number]    CC:  Norina Bamberger, MD

## 2020-06-28 ENCOUNTER — HOSPITAL ENCOUNTER (EMERGENCY)
Age: 50
Discharge: HOME | End: 2020-06-28
Attending: EMERGENCY MEDICINE
Payer: MEDICAID

## 2020-06-28 VITALS
TEMPERATURE: 98.2 F | OXYGEN SATURATION: 96 % | RESPIRATION RATE: 16 BRPM | SYSTOLIC BLOOD PRESSURE: 156 MMHG | WEIGHT: 164.13 LBS | BODY MASS INDEX: 24.88 KG/M2 | HEIGHT: 68 IN | HEART RATE: 80 BPM | DIASTOLIC BLOOD PRESSURE: 94 MMHG

## 2020-06-28 PROCEDURE — 99283 EMERGENCY DEPT VISIT LOW MDM: CPT

## 2020-06-28 ASSESSMENT — PAIN DESCRIPTION - FREQUENCY: FREQUENCY: CONTINUOUS

## 2020-06-28 ASSESSMENT — ENCOUNTER SYMPTOMS
COUGH: 0
COLOR CHANGE: 1
NAUSEA: 0
VOMITING: 0
DIARRHEA: 0
SHORTNESS OF BREATH: 0

## 2020-06-28 ASSESSMENT — PAIN DESCRIPTION - PAIN TYPE: TYPE: ACUTE PAIN

## 2020-06-28 ASSESSMENT — PAIN DESCRIPTION - ORIENTATION: ORIENTATION: RIGHT

## 2020-06-28 ASSESSMENT — PAIN DESCRIPTION - DESCRIPTORS: DESCRIPTORS: SQUEEZING

## 2020-06-28 ASSESSMENT — PAIN SCALES - GENERAL: PAINLEVEL_OUTOF10: 6

## 2020-06-28 ASSESSMENT — PAIN DESCRIPTION - LOCATION: LOCATION: HAND

## 2022-02-06 ENCOUNTER — HOSPITAL ENCOUNTER (EMERGENCY)
Age: 52
Discharge: HOME | End: 2022-02-06
Attending: EMERGENCY MEDICINE
Payer: MEDICAID

## 2022-02-06 VITALS
WEIGHT: 136 LBS | HEIGHT: 65 IN | SYSTOLIC BLOOD PRESSURE: 137 MMHG | DIASTOLIC BLOOD PRESSURE: 87 MMHG | TEMPERATURE: 98 F | RESPIRATION RATE: 18 BRPM | BODY MASS INDEX: 22.66 KG/M2 | OXYGEN SATURATION: 97 % | HEART RATE: 96 BPM

## 2022-02-06 DIAGNOSIS — R46.89 AGGRESSIVE BEHAVIOR: Primary | ICD-10-CM

## 2022-02-06 LAB
ACETAMINOPHEN LEVEL: <5 MCG/ML (ref 10–30)
ALBUMIN SERPL-MCNC: 4 G/DL (ref 3.5–5.2)
ALP BLD-CCNC: 55 U/L (ref 35–104)
ALT SERPL-CCNC: 11 U/L (ref 0–32)
AMPHETAMINE SCREEN, URINE: NOT DETECTED
ANION GAP SERPL CALCULATED.3IONS-SCNC: 12 MMOL/L (ref 7–16)
AST SERPL-CCNC: 11 U/L (ref 0–31)
BACTERIA: NORMAL /HPF
BARBITURATE SCREEN URINE: NOT DETECTED
BASOPHILS ABSOLUTE: 0.05 E9/L (ref 0–0.2)
BASOPHILS RELATIVE PERCENT: 0.5 % (ref 0–2)
BENZODIAZEPINE SCREEN, URINE: NOT DETECTED
BILIRUB SERPL-MCNC: <0.2 MG/DL (ref 0–1.2)
BILIRUBIN URINE: NEGATIVE
BLOOD, URINE: NEGATIVE
BUN BLDV-MCNC: 15 MG/DL (ref 6–20)
CALCIUM SERPL-MCNC: 9.1 MG/DL (ref 8.6–10.2)
CANNABINOID SCREEN URINE: NOT DETECTED
CHLORIDE BLD-SCNC: 103 MMOL/L (ref 98–107)
CLARITY: ABNORMAL
CO2: 24 MMOL/L (ref 22–29)
COCAINE METABOLITE SCREEN URINE: NOT DETECTED
COLOR: YELLOW
CREAT SERPL-MCNC: 0.9 MG/DL (ref 0.5–1)
EOSINOPHILS ABSOLUTE: 0.12 E9/L (ref 0.05–0.5)
EOSINOPHILS RELATIVE PERCENT: 1.2 % (ref 0–6)
ETHANOL: <10 MG/DL (ref 0–0.08)
FENTANYL SCREEN, URINE: NOT DETECTED
GFR AFRICAN AMERICAN: >60
GFR NON-AFRICAN AMERICAN: >60 ML/MIN/1.73
GLUCOSE BLD-MCNC: 107 MG/DL (ref 74–99)
GLUCOSE URINE: NEGATIVE MG/DL
HCT VFR BLD CALC: 37.5 % (ref 34–48)
HEMOGLOBIN: 12.5 G/DL (ref 11.5–15.5)
IMMATURE GRANULOCYTES #: 0.05 E9/L
IMMATURE GRANULOCYTES %: 0.5 % (ref 0–5)
INFLUENZA A: NOT DETECTED
INFLUENZA B: NOT DETECTED
KETONES, URINE: NEGATIVE MG/DL
LEUKOCYTE ESTERASE, URINE: ABNORMAL
LYMPHOCYTES ABSOLUTE: 1.84 E9/L (ref 1.5–4)
LYMPHOCYTES RELATIVE PERCENT: 18.6 % (ref 20–42)
Lab: NORMAL
MCH RBC QN AUTO: 32.1 PG (ref 26–35)
MCHC RBC AUTO-ENTMCNC: 33.3 % (ref 32–34.5)
MCV RBC AUTO: 96.4 FL (ref 80–99.9)
METHADONE SCREEN, URINE: NOT DETECTED
MONOCYTES ABSOLUTE: 0.6 E9/L (ref 0.1–0.95)
MONOCYTES RELATIVE PERCENT: 6.1 % (ref 2–12)
NEUTROPHILS ABSOLUTE: 7.22 E9/L (ref 1.8–7.3)
NEUTROPHILS RELATIVE PERCENT: 73.1 % (ref 43–80)
NITRITE, URINE: NEGATIVE
OPIATE SCREEN URINE: NOT DETECTED
OXYCODONE URINE: NOT DETECTED
PDW BLD-RTO: 13.1 FL (ref 11.5–15)
PH UA: 6.5 (ref 5–9)
PHENCYCLIDINE SCREEN URINE: NOT DETECTED
PLATELET # BLD: 220 E9/L (ref 130–450)
PMV BLD AUTO: 10 FL (ref 7–12)
POTASSIUM SERPL-SCNC: 4.2 MMOL/L (ref 3.5–5)
PROTEIN UA: NEGATIVE MG/DL
RBC # BLD: 3.89 E12/L (ref 3.5–5.5)
RBC UA: NORMAL /HPF (ref 0–2)
RENAL EPITHELIAL, UA: NORMAL /HPF
SALICYLATE, SERUM: <0.3 MG/DL (ref 0–30)
SARS-COV-2 RNA, RT PCR: NOT DETECTED
SODIUM BLD-SCNC: 139 MMOL/L (ref 132–146)
SPECIFIC GRAVITY UA: 1.02 (ref 1–1.03)
TOTAL PROTEIN: 6.6 G/DL (ref 6.4–8.3)
TRICYCLIC ANTIDEPRESSANTS SCREEN SERUM: NEGATIVE NG/ML
UROBILINOGEN, URINE: 0.2 E.U./DL
VALPROIC ACID LEVEL: 3 MCG/ML (ref 50–100)
WBC # BLD: 9.9 E9/L (ref 4.5–11.5)
WBC UA: NORMAL /HPF (ref 0–5)

## 2022-02-06 PROCEDURE — 82077 ASSAY SPEC XCP UR&BREATH IA: CPT

## 2022-02-06 PROCEDURE — 80053 COMPREHEN METABOLIC PANEL: CPT

## 2022-02-06 PROCEDURE — 80143 DRUG ASSAY ACETAMINOPHEN: CPT

## 2022-02-06 PROCEDURE — 80164 ASSAY DIPROPYLACETIC ACD TOT: CPT

## 2022-02-06 PROCEDURE — 80179 DRUG ASSAY SALICYLATE: CPT

## 2022-02-06 PROCEDURE — 36415 COLL VENOUS BLD VENIPUNCTURE: CPT

## 2022-02-06 PROCEDURE — 81001 URINALYSIS AUTO W/SCOPE: CPT

## 2022-02-06 PROCEDURE — 6370000000 HC RX 637 (ALT 250 FOR IP): Performed by: EMERGENCY MEDICINE

## 2022-02-06 PROCEDURE — 85025 COMPLETE CBC W/AUTO DIFF WBC: CPT

## 2022-02-06 PROCEDURE — 87636 SARSCOV2 & INF A&B AMP PRB: CPT

## 2022-02-06 PROCEDURE — 99285 EMERGENCY DEPT VISIT HI MDM: CPT

## 2022-02-06 PROCEDURE — 80307 DRUG TEST PRSMV CHEM ANLYZR: CPT

## 2022-02-06 PROCEDURE — 93005 ELECTROCARDIOGRAM TRACING: CPT | Performed by: EMERGENCY MEDICINE

## 2022-02-06 RX ORDER — SENNA PLUS 8.6 MG/1
1 TABLET ORAL DAILY
COMMUNITY

## 2022-02-06 RX ORDER — PALIPERIDONE 6 MG/1
6 TABLET, EXTENDED RELEASE ORAL EVERY MORNING
COMMUNITY

## 2022-02-06 RX ORDER — QUETIAPINE FUMARATE 50 MG/1
50 TABLET, EXTENDED RELEASE ORAL 2 TIMES DAILY
COMMUNITY

## 2022-02-06 RX ORDER — ACETAMINOPHEN 500 MG
1000 TABLET ORAL ONCE
Status: COMPLETED | OUTPATIENT
Start: 2022-02-06 | End: 2022-02-06

## 2022-02-06 RX ORDER — DEXTROMETHORPHAN HYDROBROMIDE AND QUINIDINE SULFATE 20; 10 MG/1; MG/1
1 CAPSULE, GELATIN COATED ORAL 2 TIMES DAILY
COMMUNITY

## 2022-02-06 RX ORDER — CLOPIDOGREL BISULFATE 75 MG/1
75 TABLET ORAL DAILY
COMMUNITY

## 2022-02-06 RX ADMIN — ACETAMINOPHEN 1000 MG: 500 TABLET ORAL at 16:46

## 2022-02-06 ASSESSMENT — PAIN SCALES - GENERAL: PAINLEVEL_OUTOF10: 10
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
## 2022-02-06 NOTE — ED NOTES
Attempted to call [facility] to discuss patient return to facility. No answer. Phone continued to ring, no voicemail option.       Elena Presser, LSW  02/06/22 9059

## 2022-02-06 NOTE — ED NOTES
Spoke with Sarai Michelle RN at [facility]. She had no additional concerns for patient behaviors and no concerns with patient returning to facility. She just requested that report be called and ETA provided for return.       Quin Frye  02/06/22 3725

## 2022-02-06 NOTE — ED NOTES
[address] called me back to receive Nurse to Nurse report. Nurse to Nurse report given she declines to have any questions at the end of report.       Halie Serrano RN  02/06/22 0284

## 2022-02-06 NOTE — ED NOTES
Bed: BH-28A  Expected date: 2/6/22  Expected time: 11:45 AM  Means of arrival:   Comments:  Lucia Johns RN  02/06/22 2294
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
## 2022-02-06 NOTE — ED NOTES
Attempted to give nurse to nurse report to Phone [phone] transferred me to 3rd floor x2 without anyone answering the phone. I was transferred back to the  and I asked to speak with the nursing supervisor to attempt to give nurse to nurse report. She transferred me to the Director of nursing office where I left a message with my return phone call for nurse to nurse report as well as ETA of the ambulance arrival here for transport back to the nursing home.       Cullman Plan, RN  02/06/22 0709

## 2022-02-06 NOTE — ED NOTES
Attempted to contact [facility] again, phone continues to ring with no answer.       EMERALD Rodriguez  02/06/22 4613

## 2022-02-06 NOTE — ED NOTES
Call [facility] for [facility]- ETA 90 min-2 hours.      John Dobbins [facility]  02/06/22 6882

## 2022-02-07 LAB
EKG ATRIAL RATE: 69 BPM
EKG P AXIS: 61 DEGREES
EKG P-R INTERVAL: 146 MS
EKG Q-T INTERVAL: 414 MS
EKG QRS DURATION: 86 MS
EKG QTC CALCULATION (BAZETT): 443 MS
EKG R AXIS: 50 DEGREES
EKG T AXIS: 58 DEGREES
EKG VENTRICULAR RATE: 69 BPM

## 2022-02-07 PROCEDURE — 93010 ELECTROCARDIOGRAM REPORT: CPT | Performed by: INTERNAL MEDICINE
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
## 2022-03-10 ENCOUNTER — HOSPITAL ENCOUNTER (EMERGENCY)
Age: 52
Discharge: HOME | End: 2022-03-10
Attending: STUDENT IN AN ORGANIZED HEALTH CARE EDUCATION/TRAINING PROGRAM
Payer: MEDICAID

## 2022-03-10 ENCOUNTER — APPOINTMENT (OUTPATIENT)
Dept: GENERAL RADIOLOGY | Age: 52
End: 2022-03-10
Payer: MEDICAID

## 2022-03-10 VITALS
RESPIRATION RATE: 16 BRPM | OXYGEN SATURATION: 98 % | SYSTOLIC BLOOD PRESSURE: 146 MMHG | BODY MASS INDEX: 22.66 KG/M2 | HEART RATE: 71 BPM | WEIGHT: 136 LBS | DIASTOLIC BLOOD PRESSURE: 77 MMHG | TEMPERATURE: 98.3 F | HEIGHT: 65 IN

## 2022-03-10 DIAGNOSIS — R07.9 CHEST PAIN, UNSPECIFIED TYPE: Primary | ICD-10-CM

## 2022-03-10 LAB
ANION GAP SERPL CALCULATED.3IONS-SCNC: 11 MMOL/L (ref 7–16)
BACTERIA: ABNORMAL /HPF
BASOPHILS ABSOLUTE: 0.04 E9/L (ref 0–0.2)
BASOPHILS RELATIVE PERCENT: 0.6 % (ref 0–2)
BILIRUBIN URINE: NEGATIVE
BLOOD, URINE: ABNORMAL
BUN BLDV-MCNC: 16 MG/DL (ref 6–20)
CALCIUM SERPL-MCNC: 7.9 MG/DL (ref 8.6–10.2)
CHLORIDE BLD-SCNC: 111 MMOL/L (ref 98–107)
CLARITY: ABNORMAL
CO2: 22 MMOL/L (ref 22–29)
COLOR: YELLOW
CREAT SERPL-MCNC: 0.8 MG/DL (ref 0.5–1)
EKG ATRIAL RATE: 70 BPM
EKG P AXIS: 82 DEGREES
EKG P-R INTERVAL: 150 MS
EKG Q-T INTERVAL: 400 MS
EKG QRS DURATION: 84 MS
EKG QTC CALCULATION (BAZETT): 432 MS
EKG R AXIS: 84 DEGREES
EKG T AXIS: 80 DEGREES
EKG VENTRICULAR RATE: 70 BPM
EOSINOPHILS ABSOLUTE: 0.14 E9/L (ref 0.05–0.5)
EOSINOPHILS RELATIVE PERCENT: 2.1 % (ref 0–6)
EPITHELIAL CELLS, UA: ABNORMAL /HPF
GFR AFRICAN AMERICAN: >60
GFR NON-AFRICAN AMERICAN: >60 ML/MIN/1.73
GLUCOSE BLD-MCNC: 116 MG/DL (ref 74–99)
GLUCOSE URINE: NEGATIVE MG/DL
HCT VFR BLD CALC: 37.7 % (ref 34–48)
HEMOGLOBIN: 12.4 G/DL (ref 11.5–15.5)
IMMATURE GRANULOCYTES #: 0.02 E9/L
IMMATURE GRANULOCYTES %: 0.3 % (ref 0–5)
KETONES, URINE: NEGATIVE MG/DL
LEUKOCYTE ESTERASE, URINE: NEGATIVE
LYMPHOCYTES ABSOLUTE: 0.91 E9/L (ref 1.5–4)
LYMPHOCYTES RELATIVE PERCENT: 13.7 % (ref 20–42)
MAGNESIUM: 1.6 MG/DL (ref 1.6–2.6)
MCH RBC QN AUTO: 32.7 PG (ref 26–35)
MCHC RBC AUTO-ENTMCNC: 32.9 % (ref 32–34.5)
MCV RBC AUTO: 99.5 FL (ref 80–99.9)
MONOCYTES ABSOLUTE: 0.42 E9/L (ref 0.1–0.95)
MONOCYTES RELATIVE PERCENT: 6.3 % (ref 2–12)
NEUTROPHILS ABSOLUTE: 5.12 E9/L (ref 1.8–7.3)
NEUTROPHILS RELATIVE PERCENT: 77 % (ref 43–80)
NITRITE, URINE: NEGATIVE
PDW BLD-RTO: 13.6 FL (ref 11.5–15)
PH UA: 6 (ref 5–9)
PLATELET # BLD: 175 E9/L (ref 130–450)
PMV BLD AUTO: 10.7 FL (ref 7–12)
POTASSIUM SERPL-SCNC: 3.8 MMOL/L (ref 3.5–5)
PROTEIN UA: NEGATIVE MG/DL
RBC # BLD: 3.79 E12/L (ref 3.5–5.5)
RBC UA: ABNORMAL /HPF (ref 0–2)
SODIUM BLD-SCNC: 144 MMOL/L (ref 132–146)
SPECIFIC GRAVITY UA: 1.02 (ref 1–1.03)
TROPONIN, HIGH SENSITIVITY: 23 NG/L (ref 0–9)
TROPONIN, HIGH SENSITIVITY: 25 NG/L (ref 0–9)
TROPONIN, HIGH SENSITIVITY: 26 NG/L (ref 0–9)
UROBILINOGEN, URINE: 0.2 E.U./DL
WBC # BLD: 6.7 E9/L (ref 4.5–11.5)
WBC UA: ABNORMAL /HPF (ref 0–5)

## 2022-03-10 PROCEDURE — 93005 ELECTROCARDIOGRAM TRACING: CPT | Performed by: STUDENT IN AN ORGANIZED HEALTH CARE EDUCATION/TRAINING PROGRAM

## 2022-03-10 PROCEDURE — 2580000003 HC RX 258: Performed by: STUDENT IN AN ORGANIZED HEALTH CARE EDUCATION/TRAINING PROGRAM

## 2022-03-10 PROCEDURE — 84484 ASSAY OF TROPONIN QUANT: CPT

## 2022-03-10 PROCEDURE — 71045 X-RAY EXAM CHEST 1 VIEW: CPT

## 2022-03-10 PROCEDURE — 83735 ASSAY OF MAGNESIUM: CPT

## 2022-03-10 PROCEDURE — 80048 BASIC METABOLIC PNL TOTAL CA: CPT

## 2022-03-10 PROCEDURE — 36415 COLL VENOUS BLD VENIPUNCTURE: CPT

## 2022-03-10 PROCEDURE — 99285 EMERGENCY DEPT VISIT HI MDM: CPT

## 2022-03-10 PROCEDURE — 81001 URINALYSIS AUTO W/SCOPE: CPT

## 2022-03-10 PROCEDURE — 85025 COMPLETE CBC W/AUTO DIFF WBC: CPT

## 2022-03-10 RX ORDER — 0.9 % SODIUM CHLORIDE 0.9 %
1000 INTRAVENOUS SOLUTION INTRAVENOUS ONCE
Status: COMPLETED | OUTPATIENT
Start: 2022-03-10 | End: 2022-03-10

## 2022-03-10 RX ADMIN — SODIUM CHLORIDE 1000 ML: 9 INJECTION, SOLUTION INTRAVENOUS at 11:11

## 2022-03-10 ASSESSMENT — PAIN SCALES - GENERAL
PAINLEVEL_OUTOF10: 10
PAINLEVEL_OUTOF10: 8

## 2022-03-10 NOTE — ED NOTES
RN called to pts facility. Aware pt is ready for discharge and returning to facility.      Kemal Friend RN  03/10/22 4233

## 2022-03-10 NOTE — ED PROVIDER NOTES
Department of Emergency Medicine   ED  Provider Note  Admit Date/RoomTime: 3/10/2022 10:35 AM  ED Room: [facility]-          History of Present Illness:  3/10/22, Time: 10:47 AM EST  Chief Complaint   Patient presents with    Gait Problem     from [facility].  has chest pain and right arm pain, alos unbalanced on feet per staff     Chest Pain       Sergey Lee is a 46 y.o. female presenting to the ED for Chest pain. This started several days ago. It is left-sided. It radiates to her left arm. Not makes it better or worse. It is intermittent in duration. It is mild to moderate in severity. She is not having any chest pain at present. Additionally, nursing staff at the home states that she was having a little bit of more difficulty with her gait. She did not fall. She not lose consciousness because of this. She denies to me any unilateral weakness numbness or paresthesias. She states that she has to urinate. She apparently on the transfer paperwork from [facility] rehab the patient was having difficulty standing without assistance and not difficulty ambulating without assistance.     Review of Systems:  Review of systems obtained and negative unless stated otherwise above in the HPI.    --------------------------------------------- PAST HISTORY ---------------------------------------------  Past Medical History:  has a past medical history of Acute kidney failure with tubular necrosis ([facility] Anoxic brain damage (HCC), Anoxic brain injury ([facility] Bipolar 1 disorder ([facility] Bipolar disorder ([facility] Cardiac arrest ([facility] Cerebral artery occlusion with cerebral infarction ([facility] abuse ([facility] COVID-19, CVA (cerebral vascular accident) ([facility] Dementia ([facility] Depressed, Depression, GERD (gastroesophageal reflux disease), HLD (hyperlipidemia), HTN (hypertension), Hyperlipidemia, Hyperlipidemia, Hypertension, Iron deficiency anemia, Pseudobulbar affect, Schizo affective schizophrenia ([facility] Schizoaffective disorder ([facility] Suicidal ideation, Suicidal ideations, Tachycardia, and Tachycardia. Past Surgical History:  has a past surgical history that includes LEEP and Foot surgery. Social History:  reports that she has never smoked. She has never used smokeless tobacco. She reports previous alcohol use. She reports that she does not use drugs. Family History: family history is not on file. . Unless otherwise noted, family history is non contributory    The patients home medications have been reviewed. Allergies: Aripiprazole and Aspirin    I have reviewed the past medical history, past surgical history, social history, and family history    ---------------------------------------------------PHYSICAL EXAM--------------------------------------    Constitutional: Appears in no distress  Head: Normocephalic, atraumatic  Eyes: Non-icteric slcera, no conjunctival injection  ENT: Moist mucous membranes,  Neck: Trachea midline, no JVD  Respiratory: Nonlabored respirations. Lungs clear to auscultation bilaterally, no wheezes, rales, or rhonchi. Cardiovascular: Regular rate. Regular rhythm. No murmurs, no gallops, no rubs. Gastrointestinal: Abdomen Soft, Non tender, Non distended. No rebound tenderness, guarding, or rigidity. Extremities: No lower extremity edema  Genitourinary: No CVA tenderness, no suprapubic tenderness  Musculoskeletal: Moves all extremities, no deformity  Skin: Pink, warm, dry without rash. Neurologic:   NIH Stroke Scale/Score at time of initial evaluation:  1A: Level of Consciousness 0 - alert; keenly responsive   1B: Ask Month and Age 0 - answers both questions correctly   1C:  Tell Patient To Open and Close Eyes, then Hand  Squeeze 0 - performs both tasks correctly   2: Test Horizontal Extraocular Movements 0 - normal   3: Test Visual Fields 0 - no visual loss   4: Test Facial Palsy 0 - normal symmetric movement   5A: Test Left Arm Motor Drift 0 - no drift, limb holds 90 (or 45) degrees for full 10 seconds   5B: Test Right Arm Motor Drift 0 - no drift, limb holds 90 (or 45) degrees for full 10 seconds   6A: Test Left Leg Motor Drift 0 - no drift; leg holds 30 degree position for full 5 seconds   6B: Test Right Leg Motor Drift 0 - no drift; leg holds 30 degree position for full 5 seconds   7: Test Limb Ataxia   (FNF/Heel-Shin) 0 - absent   8: Test Sensation 0 - normal; no sensory loss   9: Test Language/Aphasia 0 - no aphasia, normal   10: Test Dysarthria 0 - normal   11: Test Extinction/Inattention 0 - no abnormality   Total 0         -------------------------------------------------- RESULTS -------------------------------------------------  I have personally reviewed all laboratory and imaging results for this patient. Results are listed below.      LABS: (Lab results interpreted by me)  Results for orders placed or performed during the hospital encounter of 48/09/10   Basic Metabolic Panel   Result Value Ref Range    Sodium 144 132 - 146 mmol/L    Potassium 3.8 3.5 - 5.0 mmol/L    Chloride 111 (H) 98 - 107 mmol/L    CO2 22 22 - 29 mmol/L    Anion Gap 11 7 - 16 mmol/L    Glucose 116 (H) 74 - 99 mg/dL    BUN 16 6 - 20 mg/dL    CREATININE 0.8 0.5 - 1.0 mg/dL    GFR Non-African American >60 >=60 mL/min/1.73    GFR African American >60     Calcium 7.9 (L) 8.6 - 10.2 mg/dL   CBC with Auto Differential   Result Value Ref Range    WBC 6.7 4.5 - 11.5 E9/L    RBC 3.79 3.50 - 5.50 E12/L    Hemoglobin 12.4 11.5 - 15.5 g/dL    Hematocrit 37.7 34.0 - 48.0 %    MCV 99.5 80.0 - 99.9 fL    MCH 32.7 26.0 - 35.0 pg    MCHC 32.9 32.0 - 34.5 %    RDW 13.6 11.5 - 15.0 fL    Platelets 462 565 - 952 E9/L    MPV 10.7 7.0 - 12.0 fL    Neutrophils % 77.0 43.0 - 80.0 %    Immature Granulocytes % 0.3 0.0 - 5.0 %    Lymphocytes % 13.7 (L) 20.0 - 42.0 %    Monocytes % 6.3 2.0 - 12.0 %    Eosinophils % 2.1 0.0 - 6.0 %    Basophils % 0.6 0.0 - 2.0 %    Neutrophils Absolute 5.12 1.80 - 7.30 E9/L    Immature Granulocytes # 0.02 E9/L    Lymphocytes Absolute 0.91 (L) 1.50 - 4.00 E9/L    Monocytes Absolute 0.42 0.10 - 0.95 E9/L    Eosinophils Absolute 0.14 0.05 - 0.50 E9/L    Basophils Absolute 0.04 0.00 - 0.20 E9/L   Troponin   Result Value Ref Range    Troponin, High Sensitivity 26 (H) 0 - 9 ng/L   Urinalysis with Microscopic   Result Value Ref Range    Color, UA Yellow Straw/Yellow    Clarity, UA SL CLOUDY Clear    Glucose, Ur Negative Negative mg/dL    Bilirubin Urine Negative Negative    Ketones, Urine Negative Negative mg/dL    Specific Gravity, UA 1.025 1.005 - 1.030    Blood, Urine TRACE-INTACT Negative    pH, UA 6.0 5.0 - 9.0    Protein, UA Negative Negative mg/dL    Urobilinogen, Urine 0.2 <2.0 E.U./dL    Nitrite, Urine Negative Negative    Leukocyte Esterase, Urine Negative Negative    WBC, UA 0-1 0 - 5 /HPF    RBC, UA 1-3 0 - 2 /HPF    Epithelial Cells, UA MODERATE /HPF    Bacteria, UA MODERATE (A) None Seen /HPF   Magnesium   Result Value Ref Range    Magnesium 1.6 1.6 - 2.6 mg/dL   Troponin   Result Value Ref Range    Troponin, High Sensitivity 23 (H) 0 - 9 ng/L   Troponin   Result Value Ref Range    Troponin, High Sensitivity 25 (H) 0 - 9 ng/L   EKG 12 Lead   Result Value Ref Range    Ventricular Rate 70 BPM    Atrial Rate 70 BPM    P-R Interval 150 ms    QRS Duration 84 ms    Q-T Interval 400 ms    QTc Calculation (Bazett) 432 ms    P Axis 82 degrees    R Axis 84 degrees    T Axis 80 degrees   ,       RADIOLOGY:  Interpreted by Radiologist unless otherwise specified  XR CHEST PORTABLE   Final Result   No radiographic evidence of acute cardiopulmonary disease.               ------------------------- NURSING NOTES AND VITALS REVIEWED ---------------------------   The nursing notes within the ED encounter and vital signs as below have been reviewed by myself  BP (!) 150/91   Pulse 75   Temp 98.3 °F (36.8 °C)   Resp 23   Ht 5' 5\" (1.651 m)   Wt 136 lb (61.7 kg)   SpO2 99%   BMI 22.63 kg/m²     Oxygen Saturation Interpretation: Normal    The patients available past medical records and past encounters were reviewed. ------------------------------ ED COURSE/MEDICAL DECISION MAKING----------------------  Medications   0.9 % sodium chloride bolus (0 mLs IntraVENous Stopped 3/10/22 1407)        Re-Evaluations: This patient's ED course included:a personal history and physicial examination and re-evaluation prior to disposition    This patient has remained hemodynamically stable during their ED course. Consultations:  None    Medical Decision Making:   Patient presents emerged part with atypical chest pain. Vital signs interpreted myself as borderline hypertensive otherwise normal.    History and physical examination findings consistent with multiple differential diagnosis including electrolyte disturbances, urinary tract infection, ACS. Work-up is initiated for this. EKG:  EKG is interpreted myself as ventricular to 70 beats per there is a P wave before every cures Compass castration is normal the QT/QTc is normal.  No ST segment elevation or depression. No T wave inversions. His EKG is interpreted myself as normal sinus rhythm no evidence of ischemia or infarct. Lab/imaging: Reviewed and unremarkable for any acute concerning abnormality. Troponin is stable in the 20s. This patient's pain is atypical and her hear score is less than 4 she will be discharged with outpatient follow-up. Counseling: The emergency provider has spoken with the patient and discussed todays results, in addition to providing specific details for the plan of care and counseling regarding the diagnosis and prognosis. Questions are answered at this time and they are agreeable with the plan.       --------------------------------- IMPRESSION AND DISPOSITION ---------------------------------    IMPRESSION  1.  Chest pain, unspecified type        DISPOSITION  Disposition: Discharge to home  Patient condition is stable    I, Dr. Rogelio Uriarte, am the primary provider of record    Rogelio Uriarte DO  Emergency Medicine    NOTE: This report was transcribed using voice recognition software.  Every effort was made to ensure accuracy; however, inadvertent computerized transcription errors may be present         Raquel Martinez DO  03/10/22 1546

## 2023-06-20 ENCOUNTER — APPOINTMENT (OUTPATIENT)
Dept: CT IMAGING | Age: 53
End: 2023-06-20
Payer: MEDICAID

## 2023-06-20 ENCOUNTER — APPOINTMENT (OUTPATIENT)
Dept: GENERAL RADIOLOGY | Age: 53
End: 2023-06-20
Payer: MEDICAID

## 2023-06-20 ENCOUNTER — APPOINTMENT (OUTPATIENT)
Dept: ULTRASOUND IMAGING | Age: 53
End: 2023-06-20
Payer: MEDICAID

## 2023-06-20 ENCOUNTER — HOSPITAL ENCOUNTER (EMERGENCY)
Age: 53
Discharge: HOME | End: 2023-06-20
Attending: EMERGENCY MEDICINE
Payer: MEDICAID

## 2023-06-20 VITALS
RESPIRATION RATE: 16 BRPM | SYSTOLIC BLOOD PRESSURE: 136 MMHG | TEMPERATURE: 98.2 F | DIASTOLIC BLOOD PRESSURE: 92 MMHG | OXYGEN SATURATION: 96 % | HEART RATE: 67 BPM | WEIGHT: 140 LBS | BODY MASS INDEX: 21.22 KG/M2 | HEIGHT: 68 IN

## 2023-06-20 DIAGNOSIS — M79.604 PAIN OF RIGHT LOWER EXTREMITY: Primary | ICD-10-CM

## 2023-06-20 PROCEDURE — 99284 EMERGENCY DEPT VISIT MOD MDM: CPT

## 2023-06-20 PROCEDURE — 6360000002 HC RX W HCPCS: Performed by: STUDENT IN AN ORGANIZED HEALTH CARE EDUCATION/TRAINING PROGRAM

## 2023-06-20 PROCEDURE — 70450 CT HEAD/BRAIN W/O DYE: CPT

## 2023-06-20 PROCEDURE — 72125 CT NECK SPINE W/O DYE: CPT

## 2023-06-20 PROCEDURE — 73610 X-RAY EXAM OF ANKLE: CPT

## 2023-06-20 PROCEDURE — 93971 EXTREMITY STUDY: CPT | Performed by: RADIOLOGY

## 2023-06-20 PROCEDURE — 96372 THER/PROPH/DIAG INJ SC/IM: CPT

## 2023-06-20 PROCEDURE — 73590 X-RAY EXAM OF LOWER LEG: CPT

## 2023-06-20 PROCEDURE — 73630 X-RAY EXAM OF FOOT: CPT

## 2023-06-20 RX ORDER — MORPHINE SULFATE 4 MG/ML
4 INJECTION, SOLUTION INTRAMUSCULAR; INTRAVENOUS ONCE
Status: COMPLETED | OUTPATIENT
Start: 2023-06-20 | End: 2023-06-20

## 2023-06-20 RX ADMIN — MORPHINE SULFATE 4 MG: 4 INJECTION, SOLUTION INTRAMUSCULAR; INTRAVENOUS at 12:51

## 2023-06-20 ASSESSMENT — PAIN SCALES - GENERAL
PAINLEVEL_OUTOF10: 10
PAINLEVEL_OUTOF10: 8

## 2023-06-20 ASSESSMENT — PAIN DESCRIPTION - ORIENTATION: ORIENTATION: RIGHT

## 2023-06-20 ASSESSMENT — LIFESTYLE VARIABLES
HOW MANY STANDARD DRINKS CONTAINING ALCOHOL DO YOU HAVE ON A TYPICAL DAY: PATIENT DOES NOT DRINK
HOW OFTEN DO YOU HAVE A DRINK CONTAINING ALCOHOL: NEVER

## 2023-06-20 ASSESSMENT — PAIN DESCRIPTION - LOCATION: LOCATION: ANKLE;LEG

## 2023-06-20 ASSESSMENT — PAIN - FUNCTIONAL ASSESSMENT: PAIN_FUNCTIONAL_ASSESSMENT: 0-10

## 2023-06-20 NOTE — ED NOTES
Nurse to nurse given to [facility].       Nahum Galicia, ANJALI  06/20/23 5584
[facility] to given Nurse to nurse with no answer.       Millicent Galicia, RN  06/20/23 5005
40

## 2023-06-20 NOTE — ED PROVIDER NOTES
[facility] ENCOUNTER    Pt Name: Alexus Parada  MRN: [id number]  [place] 1970  Date of evaluation: 6/20/2023  Provider: Slime Portillo MD  PCP: Andie Hess MD  Note Started: 12:37 PM EDT 6/20/23    HPI     Patient is a 45 y.o. female presents with a chief complaint of   Chief Complaint   Patient presents with    Leg Injury     Pt woke up this morning from [facility] in pain did x-ray showed distal tib fracture of the foot sent over very x-ray conformation    . Patient presents is concern for possible ankle fracture. Patient has a history of an anoxic brain injury. Patient stated that she does not remember what happened. Nursing facility stated that patient was complaining of ankle pain patient denies any other injuries. States that she has nothing currently. Denies any changes in urinary or bowel habits. Nursing Notes were all reviewed and agreed with or any disagreements were addressed in the HPI. History From: patietn    Review of Systems   Pertinent positives and negatives as per HPI. Physical Exam  Vitals and nursing note reviewed. Constitutional:       Appearance: She is well-developed. HENT:      Head: Normocephalic and atraumatic. Eyes:      Conjunctiva/sclera: Conjunctivae normal.   Cardiovascular:      Rate and Rhythm: Normal rate and regular rhythm. Heart sounds: Normal heart sounds. No murmur heard. Pulmonary:      Effort: Pulmonary effort is normal. No respiratory distress. Breath sounds: Normal breath sounds. No wheezing or rales. Abdominal:      General: Bowel sounds are normal.      Palpations: Abdomen is soft. Tenderness: There is no abdominal tenderness. There is no guarding or rebound. Musculoskeletal:         General: Tenderness present. Cervical back: Normal range of motion and neck supple.       Comments: Tenderness palpation to the right ankle and proximal

## 2023-06-23 ENCOUNTER — HOSPITAL ENCOUNTER (EMERGENCY)
Age: 53
Discharge: HOME | End: 2023-06-23
Payer: MEDICAID

## 2023-06-23 ENCOUNTER — APPOINTMENT (OUTPATIENT)
Dept: GENERAL RADIOLOGY | Age: 53
End: 2023-06-23
Payer: MEDICAID

## 2023-06-23 VITALS
WEIGHT: 140 LBS | BODY MASS INDEX: 21.22 KG/M2 | OXYGEN SATURATION: 97 % | SYSTOLIC BLOOD PRESSURE: 134 MMHG | HEIGHT: 68 IN | HEART RATE: 72 BPM | DIASTOLIC BLOOD PRESSURE: 95 MMHG | TEMPERATURE: 98.6 F | RESPIRATION RATE: 16 BRPM

## 2023-06-23 DIAGNOSIS — S82.444A CLOSED NONDISPLACED SPIRAL FRACTURE OF SHAFT OF RIGHT FIBULA, INITIAL ENCOUNTER: Primary | ICD-10-CM

## 2023-06-23 PROCEDURE — 73590 X-RAY EXAM OF LOWER LEG: CPT

## 2023-06-23 PROCEDURE — 73610 X-RAY EXAM OF ANKLE: CPT

## 2023-06-23 PROCEDURE — 6370000000 HC RX 637 (ALT 250 FOR IP): Performed by: NURSE PRACTITIONER

## 2023-06-23 PROCEDURE — 29515 APPLICATION SHORT LEG SPLINT: CPT

## 2023-06-23 PROCEDURE — 99283 EMERGENCY DEPT VISIT LOW MDM: CPT

## 2023-06-23 PROCEDURE — 73562 X-RAY EXAM OF KNEE 3: CPT

## 2023-06-23 RX ORDER — HYDROCODONE BITARTRATE AND ACETAMINOPHEN 5; 325 MG/1; MG/1
1 TABLET ORAL ONCE
Status: COMPLETED | OUTPATIENT
Start: 2023-06-23 | End: 2023-06-23

## 2023-06-23 RX ORDER — HYDROCODONE BITARTRATE AND ACETAMINOPHEN 5; 325 MG/1; MG/1
1 TABLET ORAL EVERY 6 HOURS PRN
Qty: 12 TABLET | Refills: 0 | Status: SHIPPED | OUTPATIENT
Start: 2023-06-23 | End: 2023-06-26

## 2023-06-23 RX ADMIN — HYDROCODONE BITARTRATE AND ACETAMINOPHEN 1 TABLET: 5; 325 TABLET ORAL at 13:36

## 2023-06-23 ASSESSMENT — PAIN SCALES - GENERAL: PAINLEVEL_OUTOF10: 10

## 2023-06-23 ASSESSMENT — PAIN DESCRIPTION - DESCRIPTORS: DESCRIPTORS: SHARP;ACHING

## 2023-06-23 ASSESSMENT — PAIN DESCRIPTION - ORIENTATION: ORIENTATION: RIGHT

## 2023-06-23 ASSESSMENT — PAIN DESCRIPTION - LOCATION: LOCATION: ANKLE

## 2023-06-23 ASSESSMENT — PAIN - FUNCTIONAL ASSESSMENT: PAIN_FUNCTIONAL_ASSESSMENT: 0-10

## 2023-06-23 ASSESSMENT — PAIN DESCRIPTION - PAIN TYPE: TYPE: ACUTE PAIN

## 2023-06-23 NOTE — ED PROVIDER NOTES
[facility]  ED  Encounter Note  Admit Date/RoomTime: 6/23/2023  1:22 PM  ED Room: [facility]/[place] [address]  Independent THERESA Visit. HPI: Rosa Esquivel 45 y.o. female with a past medical history of   Past Medical History:   Diagnosis Date    Acute kidney failure with tubular necrosis (HCC)     Anoxic brain damage (HCC)     Anoxic brain injury ([address]     Bipolar 1 disorder (HCC)     Bipolar disorder ([address]     Cardiac arrest ([address]     Cerebral artery occlusion with cerebral infarction ([address]     Cocaine abuse ([address]     COVID-19     CVA (cerebral vascular accident) ([address]     multiple    Dementia ([address]     Depressed     Depression     GERD (gastroesophageal reflux disease)     HLD (hyperlipidemia)     HTN (hypertension)     Hyperlipidemia     Hyperlipidemia     Hypertension     Iron deficiency anemia     Pseudobulbar affect     Schizo affective schizophrenia ([address]     Schizoaffective disorder ([address]     Suicidal ideation     Suicidal ideations     Tachycardia     Tachycardia      presents status post right ankle injury. The patient states that the injury happened acutely. The history is obtained from the patient. The mechanism of injury is not apparent and was unknown of the foot. Patient describes the pain as aching and pressure. Patient states the pain worsens on ambulation and with any weightbearing. Patient denies any distal neurologic symptoms including numbness, tingling, parapysis or coolness of the foot. No other reported injuries or other extremity tenderness or injuries. Presents by EMS from [address] [place] [address] for complaints of right ankle pain. She was seen here few days ago for the same complaint and CAT scans of the head and neck and x-rays of the right lower extremity which were all negative.   The nursing home staff denies any known injury the patient due to anoxic brain injury history does not recall any recent falls however there is

## 2023-06-23 NOTE — ED NOTES
Attempted to call report to [facility], no answer, phone just rang and rang.      Cristal Camilo RN  06/23/23 4900

## 2023-06-26 ENCOUNTER — TELEPHONE (OUTPATIENT)
Dept: ORTHOPEDIC SURGERY | Age: 53
End: 2023-06-26

## 2023-06-29 ENCOUNTER — OFFICE VISIT (OUTPATIENT)
Dept: ORTHOPEDIC SURGERY | Age: 53
End: 2023-06-29

## 2023-06-29 ENCOUNTER — NURSE ONLY (OUTPATIENT)
Dept: ORTHOPEDIC SURGERY | Age: 53
End: 2023-06-29

## 2023-06-29 DIAGNOSIS — S82.831A CLOSED FRACTURE OF DISTAL END OF RIGHT FIBULA, UNSPECIFIED FRACTURE MORPHOLOGY, INITIAL ENCOUNTER: Primary | ICD-10-CM

## 2023-07-27 ENCOUNTER — OFFICE VISIT (OUTPATIENT)
Dept: ORTHOPEDIC SURGERY | Age: 53
End: 2023-07-27
Payer: MEDICAID

## 2023-07-27 DIAGNOSIS — S82.831A CLOSED FRACTURE OF DISTAL END OF RIGHT FIBULA, UNSPECIFIED FRACTURE MORPHOLOGY, INITIAL ENCOUNTER: Primary | ICD-10-CM

## 2023-07-27 PROCEDURE — 99213 OFFICE O/P EST LOW 20 MIN: CPT | Performed by: NURSE PRACTITIONER

## 2023-07-27 NOTE — PROGRESS NOTES
[facility]   ORTHOPAEDIC SURGERY AND SPORTS MEDICINE  DATE OF VISIT: 07/27/23  Follow Up Visit     CHIEF COMPLAINT:   Chief Complaint   Patient presents with    Ankle Injury     6/23/23. Closed nondisplaced spiral fracture of shaft of right fibula DOI: unclear, patient is a poor historian, does not recall an injury. Patient is here today with staff from [facility], she is also unclear of the mechanism of injury. Pt is here for her 4 week follow up. HPI:    Kiko Rios is a 45y.o. year old female who presented to the office today for follow up of minimally displaced fracture of the right distal fibular shaft, previously evaluated on 6/29/2023. Previous treatment has included casting. She reports symptoms are gradually improving. The patient has responded to the treatment. REVIEW OF SYSTEMS:     General: Negative Fever, chills, fatigue   Cardiovascular: Negative chest pain, palpitations  Respiratory: Equal chest expansion, negative shortness of breath   Skin: Negative rash, open wounds  Psych: Normal affect, mood stable  Neurologic: sensation grossly intact in extremities   Musculoskeletal: See HPI      Physical Exam:     No data recorded    General: Alert and oriented x3, no acute distress  Cardiovascular/pulmonary: No labored breathing, peripheral perfusion intact  Musculoskeletal:    Foot/Ankle:   Right Ankle exam displays no swelling, negative ecchymosis. There is no deformity. Range of motion is 10 degrees dorsiflexion, 45 degrees plantarflexion. Palpation of the lateral malleolus reveals mild tenderness. Palpation of the medial malleolus reveals no tenderness. Palpation ATFL reveals no tenderness. Palpation over deltoid ligament reveals no tenderness. Palpation over the 5th metartarsal reveals no tenderness.   Palpation of the achilles tendon reveals no tenderness       Controlled Substances Monitoring:      Imaging:  X-ray including 3 views of the left ankle display stable

## 2023-09-05 ENCOUNTER — OFFICE VISIT (OUTPATIENT)
Dept: ORTHOPEDIC SURGERY | Age: 53
End: 2023-09-05
Payer: MEDICAID

## 2023-09-05 VITALS — HEIGHT: 68 IN | WEIGHT: 140 LBS | BODY MASS INDEX: 21.22 KG/M2

## 2023-09-05 DIAGNOSIS — S82.831A CLOSED FRACTURE OF DISTAL END OF RIGHT FIBULA, UNSPECIFIED FRACTURE MORPHOLOGY, INITIAL ENCOUNTER: Primary | ICD-10-CM

## 2023-09-05 PROCEDURE — 99213 OFFICE O/P EST LOW 20 MIN: CPT | Performed by: NURSE PRACTITIONER

## 2023-09-05 RX ORDER — FLUVOXAMINE MALEATE 100 MG
1 TABLET ORAL DAILY
COMMUNITY
Start: 2023-08-30

## 2023-09-05 RX ORDER — HYDROCODONE BITARTRATE AND ACETAMINOPHEN 5; 325 MG/1; MG/1
1 TABLET ORAL
COMMUNITY
Start: 2023-07-25

## 2023-09-05 NOTE — PROGRESS NOTES
[facility]   ORTHOPAEDIC SURGERY AND SPORTS MEDICINE  DATE OF VISIT: 09/05/23  Follow Up Visit     CHIEF COMPLAINT:   Chief Complaint   Patient presents with    Follow-up     Minimally displaced right distal fibular shaft fracture on 6/29/2023. Pt states her right ankle/foot pain is a 10 out of 10 today. HPI:    Diego Reyes is a 45y.o. year old female who presented to the office today for follow up of right distal fibula shaft fracture, previously evaluated on 7/27/2023. Previous treatment has included nonoperative treatment, casting, walking boot. She reports symptoms are improving. The patient has responded to the treatment. REVIEW OF SYSTEMS:     General: Negative Fever, chills, fatigue   Cardiovascular: Negative chest pain, palpitations  Respiratory: Equal chest expansion, negative shortness of breath   Skin: Negative rash, open wounds  Psych: Normal affect, mood stable  Neurologic: sensation grossly intact in extremities   Musculoskeletal: See HPI      Physical Exam:     Height: 5' 8\" (1.727 m), Weight - Scale: 140 lb (63.5 kg)    General: Alert and oriented x3, no acute distress  Cardiovascular/pulmonary: No labored breathing, peripheral perfusion intact  Musculoskeletal:    Foot/Ankle:   Right Ankle exam displays no swelling, no ecchymosis. There is no deformity. Range of motion is 10 degrees dorsiflexion, 45 degrees plantarflexion. Resisted external rotation is negative. Resisted internal rotation is negative. Palpation of the lateral malleolus reveals no tenderness. Palpation of the medial malleolus reveals no tenderness. Palpation ATFL reveals no tenderness. Palpation over deltoid ligament reveals no tenderness. Palpation over the 5th metartarsal reveals no tenderness.   Palpation of the achilles tendon reveals no tenderness   Presence of a bandage to the top of her foot dated 9/1    Controlled Substances Monitoring:      Imaging:  X-rays of the right ankle display stable

## 2023-09-27 ENCOUNTER — HOSPITAL ENCOUNTER (EMERGENCY)
Age: 53
Discharge: HOME | End: 2023-09-28
Attending: EMERGENCY MEDICINE
Payer: MEDICAID
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
## 2023-09-27 DIAGNOSIS — M54.2 ANTERIOR NECK PAIN: ICD-10-CM

## 2023-09-27 PROCEDURE — 99285 EMERGENCY DEPT VISIT HI MDM: CPT

## 2023-09-27 ASSESSMENT — ENCOUNTER SYMPTOMS
SORE THROAT: 0
BACK PAIN: 0
TROUBLE SWALLOWING: 0
ABDOMINAL PAIN: 0
VOMITING: 0
NAUSEA: 0
COUGH: 0
DIARRHEA: 0
SINUS PRESSURE: 0
WHEEZING: 0
SHORTNESS OF BREATH: 0

## 2023-09-27 ASSESSMENT — PAIN DESCRIPTION - DESCRIPTORS: DESCRIPTORS: ACHING;DISCOMFORT

## 2023-09-27 ASSESSMENT — PAIN DESCRIPTION - ORIENTATION: ORIENTATION: ANTERIOR

## 2023-09-27 ASSESSMENT — PAIN SCALES - GENERAL: PAINLEVEL_OUTOF10: 8

## 2023-09-27 ASSESSMENT — PAIN DESCRIPTION - LOCATION: LOCATION: HEAD;NECK

## 2023-09-27 ASSESSMENT — PAIN DESCRIPTION - PAIN TYPE: TYPE: ACUTE PAIN

## 2023-09-27 ASSESSMENT — PAIN - FUNCTIONAL ASSESSMENT: PAIN_FUNCTIONAL_ASSESSMENT: 0-10

## 2023-09-28 ENCOUNTER — APPOINTMENT (OUTPATIENT)
Dept: CT IMAGING | Age: 53
End: 2023-09-28
Payer: MEDICAID

## 2023-09-28 VITALS
OXYGEN SATURATION: 96 % | DIASTOLIC BLOOD PRESSURE: 79 MMHG | BODY MASS INDEX: 21.22 KG/M2 | WEIGHT: 140 LBS | TEMPERATURE: 98.6 F | RESPIRATION RATE: 15 BRPM | SYSTOLIC BLOOD PRESSURE: 109 MMHG | HEART RATE: 64 BPM | HEIGHT: 68 IN

## 2023-09-28 LAB
ANION GAP SERPL CALCULATED.3IONS-SCNC: 10 MMOL/L (ref 7–16)
BUN SERPL-MCNC: 18 MG/DL (ref 6–20)
CALCIUM SERPL-MCNC: 9 MG/DL (ref 8.6–10.2)
CHLORIDE SERPL-SCNC: 102 MMOL/L (ref 98–107)
CO2 SERPL-SCNC: 26 MMOL/L (ref 22–29)
CREAT SERPL-MCNC: 1 MG/DL (ref 0.5–1)
GFR SERPL CREATININE-BSD FRML MDRD: >60 ML/MIN/1.73M2
GLUCOSE SERPL-MCNC: 106 MG/DL (ref 74–99)
POTASSIUM SERPL-SCNC: 3.6 MMOL/L (ref 3.5–5)
SODIUM SERPL-SCNC: 138 MMOL/L (ref 132–146)

## 2023-09-28 PROCEDURE — 6360000004 HC RX CONTRAST MEDICATION: Performed by: RADIOLOGY

## 2023-09-28 PROCEDURE — 80048 BASIC METABOLIC PNL TOTAL CA: CPT

## 2023-09-28 PROCEDURE — 70498 CT ANGIOGRAPHY NECK: CPT

## 2023-09-28 RX ADMIN — IOPAMIDOL 75 ML: 755 INJECTION, SOLUTION INTRAVENOUS at 04:19
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
## 2023-09-28 NOTE — ED NOTES
Full report given to [facility] the nurse from [facility]. All questions answered.  [facility] ETA [address] Cherry Campbell RN  09/28/23 5941

## 2023-09-28 NOTE — ED NOTES
[facility] transportation set up to take patient back to [facility]: [address], [place]  09/28/23 0953

## 2024-01-17 ENCOUNTER — HOSPITAL ENCOUNTER (INPATIENT)
Age: 54
LOS: 2 days | Discharge: NURSING, REHAB OR LONG-TERM CARE | DRG: 241 | End: 2024-01-20
Attending: EMERGENCY MEDICINE | Admitting: INTERNAL MEDICINE
Payer: MEDICAID

## 2024-01-17 DIAGNOSIS — R10.9 ABDOMINAL PAIN, UNSPECIFIED ABDOMINAL LOCATION: Primary | ICD-10-CM

## 2024-01-17 DIAGNOSIS — K92.0 HEMATEMESIS, UNSPECIFIED WHETHER NAUSEA PRESENT: ICD-10-CM

## 2024-01-17 LAB
BACTERIA URNS QL MICRO: ABNORMAL
BASOPHILS # BLD: 0.05 K/UL (ref 0–0.2)
BASOPHILS NFR BLD: 1 % (ref 0–2)
BILIRUB UR QL STRIP: NEGATIVE
CLARITY UR: CLEAR
COLOR UR: YELLOW
EOSINOPHIL # BLD: 0.11 K/UL (ref 0.05–0.5)
EOSINOPHILS RELATIVE PERCENT: 1 % (ref 0–6)
EPI CELLS #/AREA URNS HPF: ABNORMAL /HPF
ERYTHROCYTE [DISTWIDTH] IN BLOOD BY AUTOMATED COUNT: 12.6 % (ref 11.5–15)
GLUCOSE UR STRIP-MCNC: NEGATIVE MG/DL
HCT VFR BLD AUTO: 41.9 % (ref 34–48)
HGB BLD-MCNC: 13.8 G/DL (ref 11.5–15.5)
HGB UR QL STRIP.AUTO: NEGATIVE
IMM GRANULOCYTES # BLD AUTO: 0.03 K/UL (ref 0–0.58)
IMM GRANULOCYTES NFR BLD: 0 % (ref 0–5)
KETONES UR STRIP-MCNC: NEGATIVE MG/DL
LEUKOCYTE ESTERASE UR QL STRIP: ABNORMAL
LYMPHOCYTES NFR BLD: 1.78 K/UL (ref 1.5–4)
LYMPHOCYTES RELATIVE PERCENT: 22 % (ref 20–42)
MCH RBC QN AUTO: 32.6 PG (ref 26–35)
MCHC RBC AUTO-ENTMCNC: 32.9 G/DL (ref 32–34.5)
MCV RBC AUTO: 99.1 FL (ref 80–99.9)
MONOCYTES NFR BLD: 0.6 K/UL (ref 0.1–0.95)
MONOCYTES NFR BLD: 7 % (ref 2–12)
NEUTROPHILS NFR BLD: 68 % (ref 43–80)
NEUTS SEG NFR BLD: 5.52 K/UL (ref 1.8–7.3)
NITRITE UR QL STRIP: NEGATIVE
PH UR STRIP: 6 [PH] (ref 5–9)
PLATELET # BLD AUTO: 188 K/UL (ref 130–450)
PMV BLD AUTO: 10 FL (ref 7–12)
PROT UR STRIP-MCNC: NEGATIVE MG/DL
RBC # BLD AUTO: 4.23 M/UL (ref 3.5–5.5)
RBC #/AREA URNS HPF: ABNORMAL /HPF
SP GR UR STRIP: 1.02 (ref 1–1.03)
UROBILINOGEN UR STRIP-ACNC: 0.2 EU/DL (ref 0–1)
WBC #/AREA URNS HPF: ABNORMAL /HPF
WBC OTHER # BLD: 8.1 K/UL (ref 4.5–11.5)

## 2024-01-17 PROCEDURE — 93005 ELECTROCARDIOGRAM TRACING: CPT

## 2024-01-17 PROCEDURE — 99285 EMERGENCY DEPT VISIT HI MDM: CPT

## 2024-01-17 PROCEDURE — 6360000002 HC RX W HCPCS

## 2024-01-17 PROCEDURE — 85025 COMPLETE CBC W/AUTO DIFF WBC: CPT

## 2024-01-17 PROCEDURE — 81001 URINALYSIS AUTO W/SCOPE: CPT

## 2024-01-17 PROCEDURE — 96375 TX/PRO/DX INJ NEW DRUG ADDON: CPT

## 2024-01-17 PROCEDURE — C9113 INJ PANTOPRAZOLE SODIUM, VIA: HCPCS

## 2024-01-17 PROCEDURE — 84484 ASSAY OF TROPONIN QUANT: CPT

## 2024-01-17 PROCEDURE — 2580000003 HC RX 258

## 2024-01-17 PROCEDURE — 80053 COMPREHEN METABOLIC PANEL: CPT

## 2024-01-17 PROCEDURE — 96374 THER/PROPH/DIAG INJ IV PUSH: CPT

## 2024-01-17 PROCEDURE — 83735 ASSAY OF MAGNESIUM: CPT

## 2024-01-17 PROCEDURE — A4216 STERILE WATER/SALINE, 10 ML: HCPCS

## 2024-01-17 PROCEDURE — 83690 ASSAY OF LIPASE: CPT

## 2024-01-17 RX ORDER — ONDANSETRON 2 MG/ML
4 INJECTION INTRAMUSCULAR; INTRAVENOUS EVERY 6 HOURS PRN
Status: DISCONTINUED | OUTPATIENT
Start: 2024-01-17 | End: 2024-01-20 | Stop reason: HOSPADM

## 2024-01-17 RX ORDER — 0.9 % SODIUM CHLORIDE 0.9 %
1000 INTRAVENOUS SOLUTION INTRAVENOUS ONCE
Status: COMPLETED | OUTPATIENT
Start: 2024-01-17 | End: 2024-01-18

## 2024-01-17 RX ADMIN — ONDANSETRON 4 MG: 2 INJECTION INTRAMUSCULAR; INTRAVENOUS at 23:18

## 2024-01-17 RX ADMIN — SODIUM CHLORIDE 1000 ML: 9 INJECTION, SOLUTION INTRAVENOUS at 23:16

## 2024-01-17 RX ADMIN — PANTOPRAZOLE SODIUM 80 MG: 40 INJECTION, POWDER, FOR SOLUTION INTRAVENOUS at 23:17

## 2024-01-17 ASSESSMENT — PAIN - FUNCTIONAL ASSESSMENT: PAIN_FUNCTIONAL_ASSESSMENT: 0-10

## 2024-01-17 ASSESSMENT — PAIN SCALES - GENERAL: PAINLEVEL_OUTOF10: 6

## 2024-01-17 ASSESSMENT — PAIN DESCRIPTION - LOCATION: LOCATION: ABDOMEN

## 2024-01-18 ENCOUNTER — APPOINTMENT (OUTPATIENT)
Dept: CT IMAGING | Age: 54
DRG: 241 | End: 2024-01-18
Payer: MEDICAID

## 2024-01-18 PROBLEM — K92.0 HEMATEMESIS: Status: ACTIVE | Noted: 2024-01-18

## 2024-01-18 PROBLEM — I10 HTN (HYPERTENSION): Status: ACTIVE | Noted: 2024-01-18

## 2024-01-18 LAB
ABO/RH: NORMAL
ALBUMIN SERPL-MCNC: 4.3 G/DL (ref 3.5–5.2)
ALP SERPL-CCNC: 62 U/L (ref 35–104)
ALT SERPL-CCNC: 11 U/L (ref 0–32)
ANION GAP SERPL CALCULATED.3IONS-SCNC: 11 MMOL/L (ref 7–16)
ANION GAP SERPL CALCULATED.3IONS-SCNC: 9 MMOL/L (ref 7–16)
ANTIBODY SCREEN: NEGATIVE
ARM BAND NUMBER: NORMAL
AST SERPL-CCNC: 14 U/L (ref 0–31)
BASOPHILS # BLD: 0.05 K/UL (ref 0–0.2)
BASOPHILS NFR BLD: 1 % (ref 0–2)
BILIRUB SERPL-MCNC: 0.2 MG/DL (ref 0–1.2)
BLOOD BANK DISPENSE STATUS: NORMAL
BLOOD BANK DISPENSE STATUS: NORMAL
BLOOD BANK SAMPLE EXPIRATION: NORMAL
BPU ID: NORMAL
BPU ID: NORMAL
BUN SERPL-MCNC: 11 MG/DL (ref 6–20)
BUN SERPL-MCNC: 17 MG/DL (ref 6–20)
CALCIUM SERPL-MCNC: 8.7 MG/DL (ref 8.6–10.2)
CALCIUM SERPL-MCNC: 9.5 MG/DL (ref 8.6–10.2)
CHLORIDE SERPL-SCNC: 100 MMOL/L (ref 98–107)
CHLORIDE SERPL-SCNC: 103 MMOL/L (ref 98–107)
CO2 SERPL-SCNC: 26 MMOL/L (ref 22–29)
CO2 SERPL-SCNC: 27 MMOL/L (ref 22–29)
COMPONENT: NORMAL
COMPONENT: NORMAL
CREAT SERPL-MCNC: 0.8 MG/DL (ref 0.5–1)
CREAT SERPL-MCNC: 1 MG/DL (ref 0.5–1)
CROSSMATCH RESULT: NORMAL
CROSSMATCH RESULT: NORMAL
EKG ATRIAL RATE: 50 BPM
EKG P AXIS: 51 DEGREES
EKG P-R INTERVAL: 150 MS
EKG Q-T INTERVAL: 466 MS
EKG QRS DURATION: 82 MS
EKG QTC CALCULATION (BAZETT): 424 MS
EKG R AXIS: 36 DEGREES
EKG T AXIS: 62 DEGREES
EKG VENTRICULAR RATE: 50 BPM
EOSINOPHIL # BLD: 0.04 K/UL (ref 0.05–0.5)
EOSINOPHILS RELATIVE PERCENT: 1 % (ref 0–6)
ERYTHROCYTE [DISTWIDTH] IN BLOOD BY AUTOMATED COUNT: 12.5 % (ref 11.5–15)
FERRITIN SERPL-MCNC: 153 NG/ML
GFR SERPL CREATININE-BSD FRML MDRD: >60 ML/MIN/1.73M2
GFR SERPL CREATININE-BSD FRML MDRD: >60 ML/MIN/1.73M2
GLUCOSE SERPL-MCNC: 108 MG/DL (ref 74–99)
GLUCOSE SERPL-MCNC: 108 MG/DL (ref 74–99)
HCT VFR BLD AUTO: 37.6 % (ref 34–48)
HCT VFR BLD AUTO: 38.4 % (ref 34–48)
HCT VFR BLD AUTO: 40.1 % (ref 34–48)
HGB BLD-MCNC: 12.3 G/DL (ref 11.5–15.5)
HGB BLD-MCNC: 12.6 G/DL (ref 11.5–15.5)
HGB BLD-MCNC: 13.1 G/DL (ref 11.5–15.5)
IMM GRANULOCYTES # BLD AUTO: <0.03 K/UL (ref 0–0.58)
IMM GRANULOCYTES NFR BLD: 0 % (ref 0–5)
INR PPP: 1
IRON SATN MFR SERPL: 46 % (ref 15–50)
IRON SERPL-MCNC: 111 UG/DL (ref 37–145)
LIPASE SERPL-CCNC: 43 U/L (ref 13–60)
LYMPHOCYTES NFR BLD: 1.23 K/UL (ref 1.5–4)
LYMPHOCYTES RELATIVE PERCENT: 16 % (ref 20–42)
MAGNESIUM SERPL-MCNC: 1.8 MG/DL (ref 1.6–2.6)
MCH RBC QN AUTO: 32.6 PG (ref 26–35)
MCHC RBC AUTO-ENTMCNC: 32.7 G/DL (ref 32–34.5)
MCV RBC AUTO: 99.8 FL (ref 80–99.9)
MONOCYTES NFR BLD: 0.55 K/UL (ref 0.1–0.95)
MONOCYTES NFR BLD: 7 % (ref 2–12)
NEUTROPHILS NFR BLD: 75 % (ref 43–80)
NEUTS SEG NFR BLD: 5.66 K/UL (ref 1.8–7.3)
PLATELET # BLD AUTO: 175 K/UL (ref 130–450)
PMV BLD AUTO: 10.3 FL (ref 7–12)
POTASSIUM SERPL-SCNC: 3.8 MMOL/L (ref 3.5–5)
POTASSIUM SERPL-SCNC: 4.2 MMOL/L (ref 3.5–5)
PROT SERPL-MCNC: 6.8 G/DL (ref 6.4–8.3)
PROTHROMBIN TIME: 11.9 SEC (ref 9.3–12.4)
RBC # BLD AUTO: 4.02 M/UL (ref 3.5–5.5)
SODIUM SERPL-SCNC: 138 MMOL/L (ref 132–146)
SODIUM SERPL-SCNC: 138 MMOL/L (ref 132–146)
TIBC SERPL-MCNC: 243 UG/DL (ref 250–450)
TRANSFUSION STATUS: NORMAL
TRANSFUSION STATUS: NORMAL
TROPONIN I SERPL HS-MCNC: <6 NG/L (ref 0–9)
UNIT DIVISION: 0
UNIT DIVISION: 0
WBC OTHER # BLD: 7.6 K/UL (ref 4.5–11.5)

## 2024-01-18 PROCEDURE — 87086 URINE CULTURE/COLONY COUNT: CPT

## 2024-01-18 PROCEDURE — 83540 ASSAY OF IRON: CPT

## 2024-01-18 PROCEDURE — C9113 INJ PANTOPRAZOLE SODIUM, VIA: HCPCS | Performed by: HOSPITALIST

## 2024-01-18 PROCEDURE — 93010 ELECTROCARDIOGRAM REPORT: CPT | Performed by: INTERNAL MEDICINE

## 2024-01-18 PROCEDURE — 6360000004 HC RX CONTRAST MEDICATION: Performed by: RADIOLOGY

## 2024-01-18 PROCEDURE — 99221 1ST HOSP IP/OBS SF/LOW 40: CPT | Performed by: STUDENT IN AN ORGANIZED HEALTH CARE EDUCATION/TRAINING PROGRAM

## 2024-01-18 PROCEDURE — A4216 STERILE WATER/SALINE, 10 ML: HCPCS | Performed by: HOSPITALIST

## 2024-01-18 PROCEDURE — 2060000000 HC ICU INTERMEDIATE R&B

## 2024-01-18 PROCEDURE — 51701 INSERT BLADDER CATHETER: CPT

## 2024-01-18 PROCEDURE — 6360000002 HC RX W HCPCS: Performed by: HOSPITALIST

## 2024-01-18 PROCEDURE — 86850 RBC ANTIBODY SCREEN: CPT

## 2024-01-18 PROCEDURE — 80048 BASIC METABOLIC PNL TOTAL CA: CPT

## 2024-01-18 PROCEDURE — 86901 BLOOD TYPING SEROLOGIC RH(D): CPT

## 2024-01-18 PROCEDURE — 85014 HEMATOCRIT: CPT

## 2024-01-18 PROCEDURE — 85610 PROTHROMBIN TIME: CPT

## 2024-01-18 PROCEDURE — 51798 US URINE CAPACITY MEASURE: CPT

## 2024-01-18 PROCEDURE — 86900 BLOOD TYPING SEROLOGIC ABO: CPT

## 2024-01-18 PROCEDURE — 36415 COLL VENOUS BLD VENIPUNCTURE: CPT

## 2024-01-18 PROCEDURE — 2580000003 HC RX 258: Performed by: HOSPITALIST

## 2024-01-18 PROCEDURE — 2580000003 HC RX 258: Performed by: STUDENT IN AN ORGANIZED HEALTH CARE EDUCATION/TRAINING PROGRAM

## 2024-01-18 PROCEDURE — 82728 ASSAY OF FERRITIN: CPT

## 2024-01-18 PROCEDURE — 83550 IRON BINDING TEST: CPT

## 2024-01-18 PROCEDURE — 74177 CT ABD & PELVIS W/CONTRAST: CPT

## 2024-01-18 PROCEDURE — APPSS45 APP SPLIT SHARED TIME 31-45 MINUTES: Performed by: NURSE PRACTITIONER

## 2024-01-18 PROCEDURE — 86923 COMPATIBILITY TEST ELECTRIC: CPT

## 2024-01-18 PROCEDURE — 85025 COMPLETE CBC W/AUTO DIFF WBC: CPT

## 2024-01-18 PROCEDURE — 85018 HEMOGLOBIN: CPT

## 2024-01-18 PROCEDURE — 2580000003 HC RX 258: Performed by: NURSE PRACTITIONER

## 2024-01-18 RX ORDER — SODIUM CHLORIDE 9 MG/ML
INJECTION, SOLUTION INTRAVENOUS PRN
Status: DISCONTINUED | OUTPATIENT
Start: 2024-01-18 | End: 2024-01-20 | Stop reason: HOSPADM

## 2024-01-18 RX ORDER — ACETAMINOPHEN 650 MG/1
650 SUPPOSITORY RECTAL EVERY 6 HOURS PRN
Status: DISCONTINUED | OUTPATIENT
Start: 2024-01-18 | End: 2024-01-20 | Stop reason: HOSPADM

## 2024-01-18 RX ORDER — POLYETHYLENE GLYCOL 3350 17 G/17G
17 POWDER, FOR SOLUTION ORAL DAILY PRN
Status: DISCONTINUED | OUTPATIENT
Start: 2024-01-18 | End: 2024-01-20 | Stop reason: HOSPADM

## 2024-01-18 RX ORDER — OXYBUTYNIN CHLORIDE 5 MG/1
5 TABLET ORAL DAILY
Status: ON HOLD | COMMUNITY
End: 2024-01-18

## 2024-01-18 RX ORDER — SODIUM CHLORIDE 0.9 % (FLUSH) 0.9 %
5-40 SYRINGE (ML) INJECTION PRN
Status: DISCONTINUED | OUTPATIENT
Start: 2024-01-18 | End: 2024-01-20 | Stop reason: HOSPADM

## 2024-01-18 RX ORDER — POTASSIUM CHLORIDE 20 MEQ/1
40 TABLET, EXTENDED RELEASE ORAL PRN
Status: DISCONTINUED | OUTPATIENT
Start: 2024-01-18 | End: 2024-01-20 | Stop reason: HOSPADM

## 2024-01-18 RX ORDER — SODIUM CHLORIDE 0.9 % (FLUSH) 0.9 %
5-40 SYRINGE (ML) INJECTION EVERY 12 HOURS SCHEDULED
Status: DISCONTINUED | OUTPATIENT
Start: 2024-01-18 | End: 2024-01-20 | Stop reason: HOSPADM

## 2024-01-18 RX ORDER — OXYBUTYNIN CHLORIDE 5 MG/1
5 TABLET, EXTENDED RELEASE ORAL DAILY
COMMUNITY

## 2024-01-18 RX ORDER — POTASSIUM CHLORIDE 7.45 MG/ML
10 INJECTION INTRAVENOUS PRN
Status: DISCONTINUED | OUTPATIENT
Start: 2024-01-18 | End: 2024-01-20 | Stop reason: HOSPADM

## 2024-01-18 RX ORDER — ACETAMINOPHEN 500 MG
500 TABLET ORAL EVERY 8 HOURS PRN
COMMUNITY

## 2024-01-18 RX ORDER — SODIUM CHLORIDE 9 MG/ML
INJECTION, SOLUTION INTRAVENOUS CONTINUOUS
Status: ACTIVE | OUTPATIENT
Start: 2024-01-18 | End: 2024-01-20

## 2024-01-18 RX ORDER — ACETAMINOPHEN 325 MG/1
650 TABLET ORAL EVERY 6 HOURS PRN
Status: DISCONTINUED | OUTPATIENT
Start: 2024-01-18 | End: 2024-01-20 | Stop reason: HOSPADM

## 2024-01-18 RX ORDER — MAGNESIUM SULFATE IN WATER 40 MG/ML
2000 INJECTION, SOLUTION INTRAVENOUS PRN
Status: DISCONTINUED | OUTPATIENT
Start: 2024-01-18 | End: 2024-01-20 | Stop reason: HOSPADM

## 2024-01-18 RX ADMIN — SODIUM CHLORIDE, PRESERVATIVE FREE 40 MG: 5 INJECTION INTRAVENOUS at 13:07

## 2024-01-18 RX ADMIN — SODIUM CHLORIDE, PRESERVATIVE FREE 10 ML: 5 INJECTION INTRAVENOUS at 20:09

## 2024-01-18 RX ADMIN — SODIUM CHLORIDE: 9 INJECTION, SOLUTION INTRAVENOUS at 16:24

## 2024-01-18 RX ADMIN — SODIUM CHLORIDE, PRESERVATIVE FREE 40 MG: 5 INJECTION INTRAVENOUS at 20:09

## 2024-01-18 RX ADMIN — SODIUM CHLORIDE, PRESERVATIVE FREE 40 MG: 5 INJECTION INTRAVENOUS at 08:32

## 2024-01-18 RX ADMIN — IOPAMIDOL 75 ML: 755 INJECTION, SOLUTION INTRAVENOUS at 01:31

## 2024-01-18 RX ADMIN — SODIUM CHLORIDE: 9 INJECTION, SOLUTION INTRAVENOUS at 05:34

## 2024-01-18 NOTE — CARE COORDINATION
Social Work:    Reviewed charts. Patient admitted from [facility] due to bloody emesis and has a history of CVA, schizoaffective bipolar disorder, anoxic brain injury/dementia.   GI was consulted, scope today.  Polina at [facility] confirm bedhold ICF return. LVM with Harika Ortiz, Legal Guardian, to also confirm return plans to [facility] when stable.  (N-17, ambulance completed)    Electronically signed by EMERALD Chilel on 1/18/2024 at 10:59 AM

## 2024-01-18 NOTE — PROGRESS NOTES
4 Eyes Skin Assessment     NAME:  Elizabeth Rose  YOB: 1970  MEDICAL RECORD NUMBER:  [id number]    The patient is being assessed for  Admission    I agree that at least one RN has performed a thorough Head to Toe Skin Assessment on the patient. ALL assessment sites listed below have been assessed.      Areas assessed by both nurses:    Head, Face, Ears, Shoulders, Back, Chest, Arms, Elbows, Hands, Sacrum. Buttock, Coccyx, Ischium, Legs. Feet and Heels, Under Medical Devices , and Other ***        Does the Patient have a Wound? No noted wound(s)       Clive Prevention initiated by RN: No  Wound Care Orders initiated by RN: No    Pressure Injury (Stage 3,4, Unstageable, DTI, NWPT, and Complex wounds) if present, place Wound referral order by RN under : No    New Ostomies, if present place, Ostomy referral order under : No     Nurse 1 eSignature: Electronically signed by Falguni Villasenor RN on 1/18/24 at 6:47 AM EST    **SHARE this note so that the co-signing nurse can place an eSignature**    Nurse 2 eSignature: {Esignature:[id number]}

## 2024-01-18 NOTE — DISCHARGE INSTR - COC
Continuity of Care Form    Patient Name: Elizabeth Rose   :  1970  MRN:  [id number]    Admit date:  2024  Discharge date:  2024      Code Status Order: Full Code   Advance Directives:     Admitting Physician:  Wade Templeton DO  PCP: Win Castillo    Discharging Nurse: Shantell ALBA  Discharging Hospital Unit/Room#: 0403/0403-A  Discharging Unit Phone Number: [phone]    Emergency Contact:   Extended Emergency Contact Information  Primary Emergency Contact: John Ortiz  [place] [place] [address]  Home Phone: [phone]  Work Phone: [phone]  Mobile Phone: [phone]  Relation: Legal Guardian  Preferred language: English   needed? No  Secondary Emergency Contact: RoseBenson   [facility]  Home Phone: [phone]  Mobile Phone: [phone]  Relation: Spouse    Past Surgical History:  Past Surgical History:   Procedure Laterality Date    FOOT SURGERY      LEEP         Immunization History:   Immunization History   Administered Date(s) Administered    COVID-19, PFIZER PURPLE top, DILUTE for use, (age 12 y+), 30mcg/0.3mL 2020, 2021       Active Problems:  Patient Active Problem List   Diagnosis Code    Dementia with behavioral disturbance (HCC) F03.918    Trauma T14.90XA    Hematemesis K92.0    HTN (hypertension) I10       Isolation/Infection:   Isolation            No Isolation          Patient Infection Status       Infection Onset Added Last Indicated Last Indicated By Review Planned Expiration Resolved Resolved By    None active    Resolved    COVID-19  20 COVID-19   06/15/20 Sarah Castillo, RN    COVID test results- and -Not detected-Dr. Oliva to discontinue Isolation  Detected 20 Positive Covid result in ODRS    COVID-19 20 COVID-19   20 Infection                        Nurse Assessment:  Last Vital Signs:

## 2024-01-18 NOTE — CONSULTS
CONSULT  Cristobal Galeas M.D.  [facility]  Dr. Jackie Rosen M.D.,  Dr. Sunil Miramontes M.D.,  Dr. Benson Soriano D.O.,  Dr. Moisés Meza D.O. ,  Dr. Dequan Taylor M.D.,      Elizabeth Rose  53 y.o.  female      Re: \"Coffee-ground emesis\"  Requesting physician: Dr. Pritchett/emergency department  Date:11:15 AM 1/18/2024        HPI: 53-year-old female patient seen in the hospital for above described issue.  Patient presents from a nursing home secondary to apparently coffee-ground emesis and emesis of dark red material.  Patient complains of some abdominal discomfort mostly in the mid/upper abdomen.  She apparently has history of CVA, hyperlipidemia, hypertension, seizure affective disorder with history of substance abuse and anoxic brain injury/dementia.  Patient does not think that she had any black or tarry stool.  She does not recall previous colonoscopy.  Nursing home medication appears to pertinently include Plavix, subcutaneous Lovenox.  It appears that patient is an twice a day famotidine.  Also appears that she has been on iron supplementation.  Laboratory work in the hospital reveals hemoglobin of 13.8 initially decreasing to 13.1 today.  MCV is 40.1 and platelet count is 175.  Chemistry panel is unremarkable with BUN of 17 and creatinine of 1.  Liver profile is also unremarkable with nonelevated transaminases, nonelevated alkaline phosphatase and nonelevated bilirubin.  Abdominal imaging has been obtained with CT scan of the abdomen and pelvis reported to showed thickening of the distal esophagus and possible small hiatal hernia.  Transverse colon is described to be slightly thickened.  CBD is reported to be in the upper limit at 6 mm    Information sources:   -Patient\  -medical record  -health care team    PMHx:  Past Medical History:   Diagnosis Date    Acute kidney failure with tubular necrosis (HCC)     Anoxic brain damage (HCC)     Anoxic brain injury (HCC)     Bipolar 1 disorder  (HCC)     Bipolar disorder (HCC)     Cardiac arrest (HCC)     Cerebral artery occlusion with cerebral infarction (HCC)     Cocaine abuse (HCC)     COVID-19     CVA (cerebral vascular accident) (HCC)     multiple    Dementia (HCC)     Depressed     Depression     GERD (gastroesophageal reflux disease)     HLD (hyperlipidemia)     HTN (hypertension)     Hyperlipidemia     Hyperlipidemia     Hypertension     Iron deficiency anemia     Pseudobulbar affect     Schizo affective schizophrenia (HCC)     Schizoaffective disorder (HCC)     Suicidal ideation     Suicidal ideations     Tachycardia     Tachycardia        PSHx:  Past Surgical History:   Procedure Laterality Date    FOOT SURGERY      LEE         Meds:  Current Facility-Administered Medications   Medication Dose Route Frequency Provider Last Rate Last Admin    sodium chloride flush 0.9 % injection 5-40 mL  5-40 mL IntraVENous 2 times per day Dalila England APRN - CNP        sodium chloride flush 0.9 % injection 5-40 mL  5-40 mL IntraVENous PRN Dalila England APRN - CNP        0.9 % sodium chloride infusion   IntraVENous PRN Dalila England APRN - CNP        potassium chloride (KLOR-CON M) extended release tablet 40 mEq  40 mEq Oral PRN Dalila England APRN - CNP        Or    potassium bicarb-citric acid (EFFER-K) effervescent tablet 40 mEq  40 mEq Oral PRN Dalila England APRN - CNP        Or    potassium chloride 10 mEq/100 mL IVPB (Peripheral Line)  10 mEq IntraVENous PRN Dalila England APRN - CNP        magnesium sulfate 2000 mg in 50 mL IVPB premix  2,000 mg IntraVENous PRN Dalila England APRN - CNP        polyethylene glycol (GLYCOLAX) packet 17 g  17 g Oral Daily PRN Dalila England APRN - CNP        acetaminophen (TYLENOL) tablet 650 mg  650 mg Oral Q6H PRN Dalila England APRN - CNP        Or    acetaminophen (TYLENOL) suppository 650 mg  650 mg Rectal Q6H PRN Dalila England APRN - CNP        0.9 % sodium chloride  addition is negative upon detailed review of systems or unobtainable unless otherwise stated in this dictation.    PE:  /72   Pulse 53   Temp 98.2 °F (36.8 °C) (Oral)   Resp 18   Ht 1.727 m (5' 8\")   Wt 63.5 kg (140 lb)   SpO2 98%   BMI 21.29 kg/m²     Gen.: NAD/adult  female  Head: Atraumatic/normocephalic  Eyes: EOMI/anicteric sclera  ENT: Moist oral mucosa/no discharge from nose or ears  Neck: Supple/trachea is midline  Chest: CTAB/symmetrical excursions  Cor: Regular/S1-S2  Abd.: Soft.  Obese.  Mildly tender in the upper abdomen.  Extr.:  No significant peripheral edema  Muscles: Decreased tone and bulk, consistent with aging condition.  Gait not tested  Skin: Warm and dry/anicteric      DATA:     Lab Results   Component Value Date/Time    WBC 7.6 01/18/2024 10:20 AM    RBC 4.02 01/18/2024 10:20 AM    HGB 13.1 01/18/2024 10:20 AM    HCT 40.1 01/18/2024 10:20 AM    MCV 99.8 01/18/2024 10:20 AM    MCH 32.6 01/18/2024 10:20 AM    MCHC 32.7 01/18/2024 10:20 AM    RDW 12.5 01/18/2024 10:20 AM     01/18/2024 10:20 AM    MPV 10.3 01/18/2024 10:20 AM     Lab Results   Component Value Date/Time     01/17/2024 11:15 PM    K 3.8 01/17/2024 11:15 PM    K 4.4 06/15/2020 05:55 AM     01/17/2024 11:15 PM    CO2 27 01/17/2024 11:15 PM    BUN 17 01/17/2024 11:15 PM    CREATININE 1.0 01/17/2024 11:15 PM    CALCIUM 9.5 01/17/2024 11:15 PM    PROT 6.8 01/17/2024 11:15 PM    LABALBU 4.3 01/17/2024 11:15 PM    LABALBU 3.4 08/27/2017 06:08 AM    BILITOT 0.2 01/17/2024 11:15 PM    BILITOT 1+ 08/26/2017 01:12 PM    ALKPHOS 62 01/17/2024 11:15 PM    AST 14 01/17/2024 11:15 PM    ALT 11 01/17/2024 11:15 PM     Lab Results   Component Value Date/Time    LIPASE 43 01/17/2024 11:15 PM     No results found for: \"AMYLASE\"      ASSESSMENT/PLAN:  Patient Active Problem List   Diagnosis    Dementia with behavioral disturbance (HCC)    Trauma    Hematemesis    HTN (hypertension)     1.  GI bleed  -Fairly

## 2024-01-18 NOTE — PROGRESS NOTES
Entered patients room. Patient found to be standing out of bed yelling \"I'm stuck\". Patient was still attached to her pure wick and and iIV was still connected. Patient stated she needed to use the restroom. This RN explained to patient that she has a external cath and that she can just pee while sitting in bed. Patient stated she still wanted to use the bathroom. Patient helped to the bathroom via 1 assist by nursing extern. Patient did not sustain any injuries during this event. Patient assisted back to bed by nursing extern. Will continue to monitor.

## 2024-01-18 NOTE — PLAN OF CARE
Patient is a 53-year-old female with history of dementia who was admitted due to hematemesis.    Hematemesis.  Continue Protonix q6.  GI following.  Await input.  Hemoglobin stable.  Continue to monitor H&H and transfuse as needed  Dementia with behavioral disturbances.  Restart home medications when able to eat  Hypertension.  BP stable.  Continue to monitor vital signs

## 2024-01-18 NOTE — ED NOTES
ED to Inpatient Handoff Report    Notified Terrie ALBA that electronic handoff available and patient ready for transport to room 403.    Safety Risks: Memory Problems and Risk of falls    Patient in Restraints: no    Constant Observer or Patient : no    Telemetry Monitoring Ordered: No          Order to transfer to unit without monitor: NA     Time completed: 0350    Vitals:    01/17/24 2259 01/17/24 2302 01/17/24 2330 01/18/24 0345   BP: (!) 164/111  (!) 131/94 (!) 129/98   Pulse: 68  66 66   Resp: 16  20 18   Temp:  98.1 °F (36.7 °C)  98.5 °F (36.9 °C)   TempSrc:  Oral     SpO2: 98%  96% 95%   Weight: 63.5 kg (140 lb)      Height: 1.727 m (5' 8\")          Opportunity for questions and clarification was provided.

## 2024-01-18 NOTE — PROGRESS NOTES
Tried calling John Ortiz, patients legal guardian for phone consent to be completed for EGD today. Message left at [phone]

## 2024-01-18 NOTE — H&P
[facility] Hospitalist Group History and Physical      CHIEF COMPLAINT:  bloody emesis    History of Present Illness:  This is a 53 year old female with past medical history significant for CVA, HLD, HTN, schizoaffective bipolar disorder, cocaine abuse, anoxic brain injury and dementia.  To ED from SNF due to emesis.  Patient reports having 4-5 emesis that were red/brown in color.  States, \"I was throwing up blood.\"  Not a very good historian.  Oriented to self and place.  Confused to time.  Does still currently smoke half pack per day.  Denies any alcohol or drug use.  Additional symptoms include left lower quadrant pain and urinary frequency.  Denies recent illness, fever, chills, shortness of breath, chest pain, and changes in bowel habits.    Blood work unremarkable.  Urine showing trace LE and trace bacteria.  CAT scan of the abdomen showing some distal esophageal thickening and possible mild colitis.  Given Protonix 80 mg IV and 1 L saline bolus in ED.  ED consulted GI for possible EGD today.  Will admit for further care and evaluation.    Informant(s) for H&P: Patient and chart review    REVIEW OF SYSTEMS:  A comprehensive review of systems was negative except for: what is in the HPI      PMH:  Past Medical History:   Diagnosis Date    Acute kidney failure with tubular necrosis (HCC)     Anoxic brain damage (HCC)     Anoxic brain injury (HCC)     Bipolar 1 disorder (HCC)     Bipolar disorder (HCC)     Cardiac arrest (HCC)     Cerebral artery occlusion with cerebral infarction (HCC)     Cocaine abuse (HCC)     COVID-19     CVA (cerebral vascular accident) (HCC)     multiple    Dementia (HCC)     Depressed     Depression     GERD (gastroesophageal reflux disease)     HLD (hyperlipidemia)     HTN (hypertension)     Hyperlipidemia     Hyperlipidemia     Hypertension     Iron deficiency anemia     Pseudobulbar affect     Schizo affective schizophrenia (HCC)     Schizoaffective disorder (HCC)     Suicidal  Hypertension-monitor blood pressure.    Code Status: Full code  DVT prophylaxis: SCD    40 minutes or more spent reviewing patient chart, assessing patient, discussing plan of care with patient and family, discussing plan of care with collaborating physician, and documentation.       NOTE: This report was transcribed using voice recognition software. Every effort was made to ensure accuracy; however, inadvertent computerized transcription errors may be present.  Electronically signed by RON Hardy CNP on 1/18/2024 at 4:12 AM

## 2024-01-18 NOTE — PROGRESS NOTES
Spoke with Dr. Mcpherson at bedside regarding patients urinary urgency/frequency. Per Dr. Mcpherson will order urine culture.

## 2024-01-18 NOTE — PROGRESS NOTES
Patient straight cathed using sterile technique for a total of 350ml of clear yellow urine on return. Patient states she feels relief. Patient tolerated procedure well.

## 2024-01-18 NOTE — PROGRESS NOTES
Contacted Dr. Mcpherson due to patient have low urine output today. Patient bladder scanned for 185ml.  Per dr. Mcpherson as long as patient is agreeable we will straight cath her one time.

## 2024-01-18 NOTE — PROGRESS NOTES
Spoke with John Ortiz. She is okay with Elizabeth receiving blood products if need be and she is also okay with the patient receiving an EGD tomorrow afternoon. Johanna ALBA and this RN confirmed this via phone consent. Consent signed and placed in chart.

## 2024-01-18 NOTE — ED PROVIDER NOTES
[facility] INTERNAL MEDICINE 2  EMERGENCY DEPARTMENT ENCOUNTER      Pt Name: Elizabeth Rose  MRN: [id number]  Birthdate 1970  Date of evaluation: 1/17/2024  Provider: Damian Pritchett MD  PCP: Win Castillo  Note Started: 10:45 PM EST 1/17/24    CHIEF COMPLAINT       Chief Complaint   Patient presents with    Abdominal Pain     Diffuse upper abd pain, emesis started this evening, coffee ground per NH staff, on plavix       HISTORY OF PRESENT ILLNESS: 1 or more Elements   History From: Patient  Limitations to history : None    Elizabeth Rose is a 53 y.o. female who presents BIBEMS from long term care facility with complaints of several episodes of coffee ground emesis, and upper epigastric abdominal pain, onset this evening. Patient is a poor historian, h/o CVA. Per EMS 2-4 episodes of coffee ground emesis were reported. Patient is unsure of GI history. Currently denies cp/sob/f/chills/tingling in extremities/dysuria/diarrhea/constipation/hematochezia.    Nursing Notes were all reviewed and agreed with or any disagreements were addressed in the HPI.    REVIEW OF SYSTEMS :    Positives and Pertinent negatives as per HPI.     PAST MEDICAL HISTORY/Chronic Conditions Affecting Care    has a past medical history of Acute kidney failure with tubular necrosis (HCC), Anoxic brain damage (HCC), Anoxic brain injury (HCC), Bipolar 1 disorder (HCC), Bipolar disorder (HCC), Cardiac arrest (HCC), Cerebral artery occlusion with cerebral infarction (HCC), Cocaine abuse (HCC), COVID-19, CVA (cerebral vascular accident) (HCC), Dementia (HCC), Depressed, Depression, GERD (gastroesophageal reflux disease), HLD (hyperlipidemia), HTN (hypertension), Hyperlipidemia, Hyperlipidemia, Hypertension, Iron deficiency anemia, Pseudobulbar affect, Schizo affective schizophrenia (HCC), Schizoaffective disorder (HCC), Suicidal ideation, Suicidal ideations, Tachycardia, and Tachycardia.     SURGICAL HISTORY     Past Surgical  History:   Procedure Laterality Date    FOOT SURGERY      LEEP         CURRENTMEDICATIONS       Current Discharge Medication List        CONTINUE these medications which have NOT CHANGED    Details   acetaminophen (TYLENOL) 500 MG tablet Take 1 tablet by mouth every 8 hours as needed for Pain      oxyBUTYnin (DITROPAN-XL) 5 MG extended release tablet Take 1 tablet by mouth daily      fluvoxaMINE (LUVOX) 100 MG tablet Take 1 tablet by mouth daily      HYDROcodone-acetaminophen (NORCO) 5-325 MG per tablet Take 1 tablet by mouth every 6-8 hours as needed.      paliperidone (INVEGA) 6 MG extended release tablet Take 9 mg by mouth every morning      dextromethorphan-quiNIDine (NUEDEXTA) 20-10 MG CAPS per capsule Take 1 capsule by mouth 2 times daily      oxybutynin (OXYTROL) 3.9 MG/24HR Place 1 patch onto the skin Twice a Week      clopidogrel (PLAVIX) 75 MG tablet Take 1 tablet by mouth daily      senna (SENOKOT) 8.6 MG tablet Take 1 tablet by mouth daily      QUEtiapine (SEROQUEL XR) 50 MG extended release tablet Take 1 tablet by mouth 2 times daily      divalproex (DEPAKOTE ER) 500 MG extended release tablet Take 1 tablet by mouth daily  Qty: 30 tablet, Refills: 3      gabapentin (NEURONTIN) 300 MG capsule Take 1 capsule by mouth 3 times daily for 3 days.  Qty: 9 capsule, Refills: 0      enoxaparin (LOVENOX) 40 MG/0.4ML injection Inject 0.4 mLs into the skin 2 times daily  Qty: 1 Syringe, Refills: 0      mirtazapine (REMERON) 7.5 MG tablet Take 1 tablet by mouth nightly  Qty: 30 tablet, Refills: 3      sertraline (ZOLOFT) 50 MG tablet Take 1 tablet by mouth nightly  Qty: 30 tablet, Refills: 3      !! ferrous sulfate (IRON 325) 325 (65 Fe) MG tablet Take 1 tablet by mouth daily (with breakfast)  Qty: 30 tablet, Refills: 3      !! folic acid (FOLVITE) 1 MG tablet Take 1 tablet by mouth daily  Qty: 30 tablet, Refills: 3      docusate sodium (COLACE, DULCOLAX) 100 MG CAPS Take 100 mg by mouth 2 times daily      tiZANidine

## 2024-01-19 ENCOUNTER — ANESTHESIA (OUTPATIENT)
Dept: OPERATING ROOM | Age: 54
End: 2024-01-19
Payer: MEDICAID

## 2024-01-19 ENCOUNTER — ANESTHESIA EVENT (OUTPATIENT)
Dept: OPERATING ROOM | Age: 54
End: 2024-01-19
Payer: MEDICAID

## 2024-01-19 LAB
ANION GAP SERPL CALCULATED.3IONS-SCNC: 9 MMOL/L (ref 7–16)
BASOPHILS # BLD: 0.03 K/UL (ref 0–0.2)
BASOPHILS NFR BLD: 0 % (ref 0–2)
BUN SERPL-MCNC: 6 MG/DL (ref 6–20)
CALCIUM SERPL-MCNC: 8.4 MG/DL (ref 8.6–10.2)
CHLORIDE SERPL-SCNC: 107 MMOL/L (ref 98–107)
CO2 SERPL-SCNC: 24 MMOL/L (ref 22–29)
CREAT SERPL-MCNC: 0.8 MG/DL (ref 0.5–1)
EOSINOPHIL # BLD: 0.08 K/UL (ref 0.05–0.5)
EOSINOPHILS RELATIVE PERCENT: 1 % (ref 0–6)
ERYTHROCYTE [DISTWIDTH] IN BLOOD BY AUTOMATED COUNT: 12.5 % (ref 11.5–15)
GFR SERPL CREATININE-BSD FRML MDRD: >60 ML/MIN/1.73M2
GLUCOSE SERPL-MCNC: 100 MG/DL (ref 74–99)
HCT VFR BLD AUTO: 36.1 % (ref 34–48)
HCT VFR BLD AUTO: 37.7 % (ref 34–48)
HCT VFR BLD AUTO: 39.6 % (ref 34–48)
HGB BLD-MCNC: 12.2 G/DL (ref 11.5–15.5)
HGB BLD-MCNC: 12.5 G/DL (ref 11.5–15.5)
HGB BLD-MCNC: 12.9 G/DL (ref 11.5–15.5)
IMM GRANULOCYTES # BLD AUTO: 0.03 K/UL (ref 0–0.58)
IMM GRANULOCYTES NFR BLD: 0 % (ref 0–5)
LYMPHOCYTES NFR BLD: 1.16 K/UL (ref 1.5–4)
LYMPHOCYTES RELATIVE PERCENT: 16 % (ref 20–42)
MCH RBC QN AUTO: 32.1 PG (ref 26–35)
MCHC RBC AUTO-ENTMCNC: 32.6 G/DL (ref 32–34.5)
MCV RBC AUTO: 98.5 FL (ref 80–99.9)
MICROORGANISM SPEC CULT: ABNORMAL
MONOCYTES NFR BLD: 0.51 K/UL (ref 0.1–0.95)
MONOCYTES NFR BLD: 7 % (ref 2–12)
NEUTROPHILS NFR BLD: 75 % (ref 43–80)
NEUTS SEG NFR BLD: 5.43 K/UL (ref 1.8–7.3)
PLATELET # BLD AUTO: 192 K/UL (ref 130–450)
PMV BLD AUTO: 10.4 FL (ref 7–12)
POTASSIUM SERPL-SCNC: 4.1 MMOL/L (ref 3.5–5)
RBC # BLD AUTO: 4.02 M/UL (ref 3.5–5.5)
SODIUM SERPL-SCNC: 140 MMOL/L (ref 132–146)
SPECIMEN DESCRIPTION: ABNORMAL
WBC OTHER # BLD: 7.2 K/UL (ref 4.5–11.5)

## 2024-01-19 PROCEDURE — 88342 IMHCHEM/IMCYTCHM 1ST ANTB: CPT

## 2024-01-19 PROCEDURE — 88305 TISSUE EXAM BY PATHOLOGIST: CPT

## 2024-01-19 PROCEDURE — 6360000002 HC RX W HCPCS: Performed by: NURSE ANESTHETIST, CERTIFIED REGISTERED

## 2024-01-19 PROCEDURE — 2580000003 HC RX 258: Performed by: HOSPITALIST

## 2024-01-19 PROCEDURE — 2580000003 HC RX 258: Performed by: NURSE ANESTHETIST, CERTIFIED REGISTERED

## 2024-01-19 PROCEDURE — 3600007501: Performed by: INTERNAL MEDICINE

## 2024-01-19 PROCEDURE — 2060000000 HC ICU INTERMEDIATE R&B

## 2024-01-19 PROCEDURE — 7100000011 HC PHASE II RECOVERY - ADDTL 15 MIN: Performed by: INTERNAL MEDICINE

## 2024-01-19 PROCEDURE — 80048 BASIC METABOLIC PNL TOTAL CA: CPT

## 2024-01-19 PROCEDURE — 6360000002 HC RX W HCPCS: Performed by: HOSPITALIST

## 2024-01-19 PROCEDURE — 2580000003 HC RX 258: Performed by: STUDENT IN AN ORGANIZED HEALTH CARE EDUCATION/TRAINING PROGRAM

## 2024-01-19 PROCEDURE — 3600007502: Performed by: INTERNAL MEDICINE

## 2024-01-19 PROCEDURE — 7100000010 HC PHASE II RECOVERY - FIRST 15 MIN: Performed by: INTERNAL MEDICINE

## 2024-01-19 PROCEDURE — 85018 HEMOGLOBIN: CPT

## 2024-01-19 PROCEDURE — 0DB98ZX EXCISION OF DUODENUM, VIA NATURAL OR ARTIFICIAL OPENING ENDOSCOPIC, DIAGNOSTIC: ICD-10-PCS | Performed by: INTERNAL MEDICINE

## 2024-01-19 PROCEDURE — 85014 HEMATOCRIT: CPT

## 2024-01-19 PROCEDURE — 6370000000 HC RX 637 (ALT 250 FOR IP): Performed by: INTERNAL MEDICINE

## 2024-01-19 PROCEDURE — 2580000003 HC RX 258: Performed by: INTERNAL MEDICINE

## 2024-01-19 PROCEDURE — 85025 COMPLETE CBC W/AUTO DIFF WBC: CPT

## 2024-01-19 PROCEDURE — 2709999900 HC NON-CHARGEABLE SUPPLY: Performed by: INTERNAL MEDICINE

## 2024-01-19 PROCEDURE — 36415 COLL VENOUS BLD VENIPUNCTURE: CPT

## 2024-01-19 PROCEDURE — A4216 STERILE WATER/SALINE, 10 ML: HCPCS | Performed by: HOSPITALIST

## 2024-01-19 PROCEDURE — 3700000000 HC ANESTHESIA ATTENDED CARE: Performed by: INTERNAL MEDICINE

## 2024-01-19 PROCEDURE — C9113 INJ PANTOPRAZOLE SODIUM, VIA: HCPCS | Performed by: HOSPITALIST

## 2024-01-19 PROCEDURE — 99232 SBSQ HOSP IP/OBS MODERATE 35: CPT | Performed by: INTERNAL MEDICINE

## 2024-01-19 RX ORDER — FLUVOXAMINE MALEATE 50 MG/1
100 TABLET, COATED ORAL DAILY
Status: DISCONTINUED | OUTPATIENT
Start: 2024-01-19 | End: 2024-01-20 | Stop reason: HOSPADM

## 2024-01-19 RX ORDER — SODIUM CHLORIDE 9 MG/ML
INJECTION, SOLUTION INTRAVENOUS CONTINUOUS PRN
Status: DISCONTINUED | OUTPATIENT
Start: 2024-01-19 | End: 2024-01-19 | Stop reason: SDUPTHER

## 2024-01-19 RX ORDER — PANTOPRAZOLE SODIUM 40 MG/1
40 TABLET, DELAYED RELEASE ORAL
Status: DISCONTINUED | OUTPATIENT
Start: 2024-01-20 | End: 2024-01-20 | Stop reason: HOSPADM

## 2024-01-19 RX ORDER — DOCUSATE SODIUM 100 MG/1
100 CAPSULE, LIQUID FILLED ORAL 2 TIMES DAILY
Status: DISCONTINUED | OUTPATIENT
Start: 2024-01-19 | End: 2024-01-20 | Stop reason: HOSPADM

## 2024-01-19 RX ORDER — OXYBUTYNIN CHLORIDE 5 MG/1
5 TABLET, EXTENDED RELEASE ORAL DAILY
Status: DISCONTINUED | OUTPATIENT
Start: 2024-01-19 | End: 2024-01-20 | Stop reason: HOSPADM

## 2024-01-19 RX ORDER — LORAZEPAM 0.5 MG/1
0.5 TABLET ORAL EVERY 6 HOURS PRN
Status: DISCONTINUED | OUTPATIENT
Start: 2024-01-19 | End: 2024-01-20 | Stop reason: HOSPADM

## 2024-01-19 RX ORDER — FERROUS SULFATE 325(65) MG
325 TABLET ORAL
Status: DISCONTINUED | OUTPATIENT
Start: 2024-01-20 | End: 2024-01-20 | Stop reason: HOSPADM

## 2024-01-19 RX ORDER — PALIPERIDONE 3 MG/1
9 TABLET, EXTENDED RELEASE ORAL EVERY MORNING
Status: DISCONTINUED | OUTPATIENT
Start: 2024-01-20 | End: 2024-01-20 | Stop reason: HOSPADM

## 2024-01-19 RX ORDER — PROPOFOL 10 MG/ML
INJECTION, EMULSION INTRAVENOUS PRN
Status: DISCONTINUED | OUTPATIENT
Start: 2024-01-19 | End: 2024-01-19 | Stop reason: SDUPTHER

## 2024-01-19 RX ADMIN — PROPOFOL 50 MG: 10 INJECTION, EMULSION INTRAVENOUS at 15:43

## 2024-01-19 RX ADMIN — DOCUSATE SODIUM 100 MG: 100 CAPSULE, LIQUID FILLED ORAL at 21:27

## 2024-01-19 RX ADMIN — OXYBUTYNIN CHLORIDE 5 MG: 5 TABLET, EXTENDED RELEASE ORAL at 16:44

## 2024-01-19 RX ADMIN — SODIUM CHLORIDE, PRESERVATIVE FREE 40 MG: 5 INJECTION INTRAVENOUS at 02:11

## 2024-01-19 RX ADMIN — SODIUM CHLORIDE, PRESERVATIVE FREE 40 MG: 5 INJECTION INTRAVENOUS at 05:36

## 2024-01-19 RX ADMIN — PROPOFOL 100 MG: 10 INJECTION, EMULSION INTRAVENOUS at 15:41

## 2024-01-19 RX ADMIN — LORAZEPAM 0.5 MG: 0.5 TABLET ORAL at 21:35

## 2024-01-19 RX ADMIN — SODIUM CHLORIDE: 9 INJECTION, SOLUTION INTRAVENOUS at 15:35

## 2024-01-19 RX ADMIN — SODIUM CHLORIDE: 9 INJECTION, SOLUTION INTRAVENOUS at 23:08

## 2024-01-19 RX ADMIN — SODIUM CHLORIDE, PRESERVATIVE FREE 40 MG: 5 INJECTION INTRAVENOUS at 13:25

## 2024-01-19 RX ADMIN — SODIUM CHLORIDE: 9 INJECTION, SOLUTION INTRAVENOUS at 11:20

## 2024-01-19 ASSESSMENT — PAIN - FUNCTIONAL ASSESSMENT: PAIN_FUNCTIONAL_ASSESSMENT: 0-10

## 2024-01-19 ASSESSMENT — PAIN SCALES - GENERAL
PAINLEVEL_OUTOF10: 0
PAINLEVEL_OUTOF10: 0

## 2024-01-19 ASSESSMENT — LIFESTYLE VARIABLES: SMOKING_STATUS: 0

## 2024-01-19 NOTE — PROGRESS NOTES
Nursing Transfer Note    Data:  Summary of patients progress: general anesthesia recovery    Reason for transfer: PACU discharge criteria met, transferred to next level of care.    Action:  Explained reason for transfer to Patient/Family  Report given to: rn, using RN Handoff Navigator.  Mode of transportation: Cart    Response:  RN Recommendations:continuation of care and pain control

## 2024-01-19 NOTE — PLAN OF CARE
Problem: Discharge Planning  Goal: Discharge to home or other facility with appropriate resources  1/18/2024 2235 by Julita Pichardo RN  Outcome: Progressing  1/18/2024 1035 by Jc Carlisle RN  Outcome: Progressing     Problem: Pain  Goal: Verbalizes/displays adequate comfort level or baseline comfort level  1/18/2024 2235 by Julita Pichardo RN  Outcome: Progressing  1/18/2024 1035 by Jc Carlisle RN  Outcome: Progressing     Problem: Safety - Adult  Goal: Free from fall injury  1/18/2024 2235 by Julita Pichardo RN  Outcome: Progressing  1/18/2024 1035 by Jc Carlisle RN  Outcome: Progressing

## 2024-01-19 NOTE — PROGRESS NOTES
[facility] Hospitalist Progress Note    Admitting Date and Time: 1/17/2024 10:51 PM  Admit Dx: Hematemesis [K92.0]  Abdominal pain, unspecified abdominal location [R10.9]    Subjective:  Patient is being followed for Hematemesis [K92.0]  Abdominal pain, unspecified abdominal location [R10.9]   Pt seen and examined this morning  No acute events overnight  Complaining of slight abdominal discomfort, otherwise denies any other complaints  For EGD today    ROS: denies fever, chills, cp, sob, n/v, HA unless stated above.      sodium chloride flush  5-40 mL IntraVENous 2 times per day    pantoprazole (PROTONIX) 40 mg in sodium chloride (PF) 0.9 % 10 mL injection  40 mg IntraVENous Q6H     sodium chloride flush, 5-40 mL, PRN  sodium chloride, , PRN  potassium chloride, 40 mEq, PRN   Or  potassium alternative oral replacement, 40 mEq, PRN   Or  potassium chloride, 10 mEq, PRN  magnesium sulfate, 2,000 mg, PRN  polyethylene glycol, 17 g, Daily PRN  acetaminophen, 650 mg, Q6H PRN   Or  acetaminophen, 650 mg, Q6H PRN  sodium chloride, , PRN  ondansetron, 4 mg, Q6H PRN         Objective:    /66   Pulse 65   Temp 98.2 °F (36.8 °C) (Oral)   Resp 18   Ht 1.727 m (5' 8\")   Wt 73.3 kg (161 lb 9.6 oz)   SpO2 92%   BMI 24.57 kg/m²     General Appearance: alert and awake, no acute distress  Skin: warm and dry  Head: normocephalic and atraumatic  Eyes: pupils equal, round, and reactive to light, extraocular eye movements intact, conjunctivae normal  Neck: neck supple and non tender without mass   Pulmonary/Chest: clear to auscultation bilaterally- no wheezes, rales or rhonchi, normal air movement, no respiratory distress  Cardiovascular: normal rate, normal S1 and S2 and no carotid bruits  Abdomen: soft, slightly tender, non-distended, normal bowel sounds, no masses or organomegaly  Extremities: no cyanosis, no clubbing and no edema  Neurologic: no cranial nerve deficit and speech normal        Recent Labs      01/17/24  2315 01/18/24  1020 01/19/24  0714    138 140   K 3.8 4.2 4.1    103 107   CO2 27 26 24   BUN 17 11 6   CREATININE 1.0 0.8 0.8   GLUCOSE 108* 108* 100*   CALCIUM 9.5 8.7 8.4*       Recent Labs     01/17/24  2315 01/18/24  1020 01/18/24  1600 01/18/24  2330 01/19/24  0714   WBC 8.1 7.6  --   --  7.2   RBC 4.23 4.02  --   --  4.02   HGB 13.8 13.1 12.6 12.3 12.9   HCT 41.9 40.1 38.4 37.6 39.6   MCV 99.1 99.8  --   --  98.5   MCH 32.6 32.6  --   --  32.1   MCHC 32.9 32.7  --   --  32.6   RDW 12.6 12.5  --   --  12.5    175  --   --  192   MPV 10.0 10.3  --   --  10.4         Assessment and Plan:     Principal Problem:    Hematemesis  Active Problems:    Dementia with behavioral disturbance (HCC)    HTN (hypertension)  Resolved Problems:    * No resolved hospital problems. *    Hematemesis.  Continue Protonix q6.  GI following. Hemoglobin stable.  Continue to monitor H&H and transfuse as needed. For EGD today  Dementia with behavioral disturbances.  Restart home medications when able to eat  Hypertension.  BP stable.  Continue to monitor vital signs    Time spent reviewing chart, clinical exam, discussing case and answering questions with staff/consultants/patient/family aprox 35 mins.     NOTE: This report was transcribed using voice recognition software. Every effort was made to ensure accuracy; however, inadvertent computerized transcription errors may be present.  Electronically signed by Lizet Mcpherson MD on 1/19/2024 at 9:43 AM

## 2024-01-19 NOTE — PROGRESS NOTES
Elizabeth Rose was ordered dextromethorphan-quinidine (Nuedexta) which is a nonformulary medication. Nurse is going to check with patient to see if home supply of this medication will be brought in to the hospital for inpatient use.  A pharmacist will follow-up with the nurse of the patient to assess the capability of the patient to bring in the medication.  If it is determined that the patient cannot supply the medication and it is not available to be dispensed from the pharmacy, a call will be placed to the ordering provider to discuss alternative options.     Edward Goode, PharmD  01/19/24 3:42 PM

## 2024-01-19 NOTE — ANESTHESIA POSTPROCEDURE EVALUATION
Department of Anesthesiology  Postprocedure Note    Patient: Elizabeth Rose  MRN: [id number]  YOB: 1970  Date of evaluation: 1/19/2024    Procedure Summary       Date: 01/19/24 Room / Location: [address]    Anesthesia Start: 1535 Anesthesia Stop: 1551    Procedure: EGD ESOPHAGOGASTRODUODENOSCOPY WITH BIOPSIES (Mouth) Diagnosis:       Hematemesis, unspecified whether nausea present      (Hematemesis, unspecified whether nausea present [K92.0])    Surgeons: Moisés Meza DO Responsible Provider: Omid Delgado DO    Anesthesia Type: MAC ASA Status: 4            Anesthesia Type: MAC    Gail Phase I:      Gail Phase II: Gail Score: 10    Anesthesia Post Evaluation    Patient location during evaluation: bedside  Patient participation: complete - patient participated  Level of consciousness: confused  Pain score: 1  Airway patency: patent  Nausea & Vomiting: no vomiting and no nausea  Cardiovascular status: hemodynamically stable  Respiratory status: acceptable  Hydration status: stable  Pain management: adequate    No notable events documented.

## 2024-01-19 NOTE — ANESTHESIA PRE PROCEDURE
Department of Anesthesiology  Preprocedure Note       Name:  Elizabeth Rose   Age:  53 y.o.  :  1970                                          MRN:  [id number]         Date:  2024      Surgeon: Surgeon(s):  Moisés Meza DO    Procedure: Procedure(s):  EGD ESOPHAGOGASTRODUODENOSCOPY    Medications prior to admission:   Prior to Admission medications    Medication Sig Start Date End Date Taking? Authorizing Provider   acetaminophen (TYLENOL) 500 MG tablet Take 1 tablet by mouth every 8 hours as needed for Pain   Yes Jason Antony MD   oxyBUTYnin (DITROPAN-XL) 5 MG extended release tablet Take 1 tablet by mouth daily   Yes Jason Antony MD   fluvoxaMINE (LUVOX) 100 MG tablet Take 1 tablet by mouth daily 23   Jason Antony MD   HYDROcodone-acetaminophen (NORCO) 5-325 MG per tablet Take 1 tablet by mouth every 6-8 hours as needed.  Patient not taking: Reported on 2024   Jason Antony MD   paliperidone (INVEGA) 6 MG extended release tablet Take 9 mg by mouth every morning    Jason Antony MD   dextromethorphan-quiNIDine (NUEDEXTA) 20-10 MG CAPS per capsule Take 1 capsule by mouth 2 times daily    Jason Antony MD   oxybutynin (OXYTROL) 3.9 MG/24HR Place 1 patch onto the skin Twice a Week  Patient not taking: Reported on 2024    Jason Antony MD   clopidogrel (PLAVIX) 75 MG tablet Take 1 tablet by mouth daily    Jason Antony MD   senna (SENOKOT) 8.6 MG tablet Take 1 tablet by mouth daily    Jason Antony MD   QUEtiapine (SEROQUEL XR) 50 MG extended release tablet Take 1 tablet by mouth 2 times daily    Jason Antony MD   divalproex (DEPAKOTE ER) 500 MG extended release tablet Take 1 tablet by mouth daily  Patient not taking: Reported on 2023   Óscar Galarza MD   gabapentin (NEURONTIN) 300 MG capsule Take 1 capsule by mouth 3 times daily for 3 days. 6/15/20 6/18/20  Óscar Galarza MD

## 2024-01-19 NOTE — PLAN OF CARE
Problem: Discharge Planning  Goal: Discharge to home or other facility with appropriate resources  1/19/2024 1027 by Ana Vazquez RN  Outcome: Progressing  1/18/2024 2235 by Julita Pichardo RN  Outcome: Progressing     Problem: Pain  Goal: Verbalizes/displays adequate comfort level or baseline comfort level  1/19/2024 1027 by Ana Vazquez RN  Outcome: Progressing  1/18/2024 2235 by Julita Pichardo RN  Outcome: Progressing     Problem: Safety - Adult  Goal: Free from fall injury  1/19/2024 1027 by Ana Vazquez RN  Outcome: Progressing  1/18/2024 2235 by Julita Pichardo RN  Outcome: Progressing

## 2024-01-20 VITALS
WEIGHT: 160.94 LBS | BODY MASS INDEX: 24.39 KG/M2 | SYSTOLIC BLOOD PRESSURE: 144 MMHG | TEMPERATURE: 98 F | HEIGHT: 68 IN | HEART RATE: 70 BPM | RESPIRATION RATE: 19 BRPM | OXYGEN SATURATION: 98 % | DIASTOLIC BLOOD PRESSURE: 87 MMHG

## 2024-01-20 LAB
ANION GAP SERPL CALCULATED.3IONS-SCNC: 13 MMOL/L (ref 7–16)
BASOPHILS # BLD: 0.03 K/UL (ref 0–0.2)
BASOPHILS NFR BLD: 0 % (ref 0–2)
BUN SERPL-MCNC: 8 MG/DL (ref 6–20)
CALCIUM SERPL-MCNC: 8.6 MG/DL (ref 8.6–10.2)
CHLORIDE SERPL-SCNC: 104 MMOL/L (ref 98–107)
CO2 SERPL-SCNC: 21 MMOL/L (ref 22–29)
CREAT SERPL-MCNC: 0.8 MG/DL (ref 0.5–1)
EOSINOPHIL # BLD: 0.07 K/UL (ref 0.05–0.5)
EOSINOPHILS RELATIVE PERCENT: 1 % (ref 0–6)
ERYTHROCYTE [DISTWIDTH] IN BLOOD BY AUTOMATED COUNT: 12.2 % (ref 11.5–15)
GFR SERPL CREATININE-BSD FRML MDRD: >60 ML/MIN/1.73M2
GLUCOSE SERPL-MCNC: 103 MG/DL (ref 74–99)
HCT VFR BLD AUTO: 39 % (ref 34–48)
HGB BLD-MCNC: 13 G/DL (ref 11.5–15.5)
IMM GRANULOCYTES # BLD AUTO: 0.04 K/UL (ref 0–0.58)
IMM GRANULOCYTES NFR BLD: 0 % (ref 0–5)
LYMPHOCYTES NFR BLD: 1.39 K/UL (ref 1.5–4)
LYMPHOCYTES RELATIVE PERCENT: 14 % (ref 20–42)
MCH RBC QN AUTO: 32.9 PG (ref 26–35)
MCHC RBC AUTO-ENTMCNC: 33.3 G/DL (ref 32–34.5)
MCV RBC AUTO: 98.7 FL (ref 80–99.9)
MONOCYTES NFR BLD: 0.62 K/UL (ref 0.1–0.95)
MONOCYTES NFR BLD: 6 % (ref 2–12)
NEUTROPHILS NFR BLD: 79 % (ref 43–80)
NEUTS SEG NFR BLD: 7.91 K/UL (ref 1.8–7.3)
PLATELET # BLD AUTO: 196 K/UL (ref 130–450)
PMV BLD AUTO: 10.8 FL (ref 7–12)
POTASSIUM SERPL-SCNC: 3.8 MMOL/L (ref 3.5–5)
RBC # BLD AUTO: 3.95 M/UL (ref 3.5–5.5)
SODIUM SERPL-SCNC: 138 MMOL/L (ref 132–146)
WBC OTHER # BLD: 10.1 K/UL (ref 4.5–11.5)

## 2024-01-20 PROCEDURE — 85025 COMPLETE CBC W/AUTO DIFF WBC: CPT

## 2024-01-20 PROCEDURE — 80048 BASIC METABOLIC PNL TOTAL CA: CPT

## 2024-01-20 PROCEDURE — 6370000000 HC RX 637 (ALT 250 FOR IP): Performed by: INTERNAL MEDICINE

## 2024-01-20 PROCEDURE — 99239 HOSP IP/OBS DSCHRG MGMT >30: CPT | Performed by: INTERNAL MEDICINE

## 2024-01-20 PROCEDURE — 2580000003 HC RX 258: Performed by: INTERNAL MEDICINE

## 2024-01-20 PROCEDURE — 36415 COLL VENOUS BLD VENIPUNCTURE: CPT

## 2024-01-20 RX ORDER — PANTOPRAZOLE SODIUM 40 MG/1
40 TABLET, DELAYED RELEASE ORAL
Qty: 30 TABLET | Refills: 3 | DISCHARGE
Start: 2024-01-21

## 2024-01-20 RX ADMIN — FERROUS SULFATE TAB 325 MG (65 MG ELEMENTAL FE) 325 MG: 325 (65 FE) TAB at 10:14

## 2024-01-20 RX ADMIN — SODIUM CHLORIDE, PRESERVATIVE FREE 10 ML: 5 INJECTION INTRAVENOUS at 10:15

## 2024-01-20 RX ADMIN — OXYBUTYNIN CHLORIDE 5 MG: 5 TABLET, EXTENDED RELEASE ORAL at 10:14

## 2024-01-20 RX ADMIN — FLUVOXAMINE MALEATE 100 MG: 50 TABLET, COATED ORAL at 10:14

## 2024-01-20 RX ADMIN — DOCUSATE SODIUM 100 MG: 100 CAPSULE, LIQUID FILLED ORAL at 10:14

## 2024-01-20 RX ADMIN — PALIPERIDONE 9 MG: 3 TABLET, EXTENDED RELEASE ORAL at 10:14

## 2024-01-20 RX ADMIN — PANTOPRAZOLE SODIUM 40 MG: 40 TABLET, DELAYED RELEASE ORAL at 05:56

## 2024-01-20 NOTE — DISCHARGE SUMMARY
[facility] Hospitalist Physician Discharge Summary       [facility]  [address] [facility] [address]  [phone]        Moisés Meza DO  [address]  [facility] [address]  [phone]    Follow up in 2 week(s)  please call to schedule follow up in 2-3 weeks      Activity level: As tolerated     Dispo: [facility]      Condition on discharge: Stable     Patient ID:  Elizabeth Rose  [id number]  53 y.o.  1970    Admit date: 1/17/2024    Discharge date and time:  1/20/2024  11:58 AM    Admission Diagnoses: Principal Problem:    Hematemesis  Active Problems:    Dementia with behavioral disturbance (HCC)    HTN (hypertension)  Resolved Problems:    * No resolved hospital problems. *      Discharge Diagnoses: Principal Problem:    Hematemesis  Active Problems:    Dementia with behavioral disturbance (HCC)    HTN (hypertension)  Resolved Problems:    * No resolved hospital problems. *      Consults:  IP CONSULT TO GI  IP CONSULT TO IV TEAM    Hospital Course:   Patient Elizabeth Rose is a 53 y.o. presented with Hematemesis [K92.0]  Abdominal pain, unspecified abdominal location [R10.9]    Patient is a 53-year-old female who was admitted due to hematemesis.  Started on high-dose Protonix.  Underwent an EGD 1/19 which showed gastritis, esophagitis with no bleeding.  She has no complaints today.  Hemoglobin remained stable.  She will be discharged back to facility in stable condition on Protonix daily and follow-up with GI in 2 to 3 weeks.    Discharge Exam:    General Appearance: alert and awake, confused  Skin: warm and dry  Head: normocephalic and atraumatic  Eyes: pupils equal, round, and reactive to light, extraocular eye movements intact, conjunctivae normal  Neck: neck supple and non tender without mass   Pulmonary/Chest: clear to auscultation bilaterally- no wheezes, rales or rhonchi, normal air movement, no respiratory distress  Cardiovascular: normal  Continue taking this medication, and follow the directions you see here.     sertraline 50 MG tablet  Commonly known as: ZOLOFT  Take 1 tablet by mouth daily  What changed: Another medication with the same name was removed. Continue taking this medication, and follow the directions you see here.            CONTINUE taking these medications      acetaminophen 500 MG tablet  Commonly known as: TYLENOL     clopidogrel 75 MG tablet  Commonly known as: PLAVIX     docusate 100 MG Caps  Commonly known as: COLACE, DULCOLAX  Take 100 mg by mouth 2 times daily     fluvoxaMINE 100 MG tablet  Commonly known as: LUVOX     LORazepam 0.5 MG tablet  Commonly known as: ATIVAN     magnesium hydroxide 400 MG/5ML suspension  Commonly known as: MILK OF MAGNESIA     Nuedexta 20-10 MG Caps per capsule  Generic drug: dextromethorphan-quiNIDine     oxyBUTYnin 5 MG extended release tablet  Commonly known as: DITROPAN-XL     paliperidone 6 MG extended release tablet  Commonly known as: INVEGA     pantoprazole 40 MG tablet  Commonly known as: PROTONIX  Take 1 tablet by mouth every morning (before breakfast)  Start taking on: January 21, 2024     QUEtiapine 50 MG extended release tablet  Commonly known as: SEROQUEL XR     senna 8.6 MG tablet  Commonly known as: SENOKOT            STOP taking these medications      amLODIPine 10 MG tablet  Commonly known as: NORVASC     atenolol 25 MG tablet  Commonly known as: TENORMIN     bisacodyl 10 MG suppository  Commonly known as: DULCOLAX     bisacodyl 5 MG EC tablet  Commonly known as: DULCOLAX     cetirizine 10 MG tablet  Commonly known as: ZYRTEC     conjugated estrogens 0.625 MG/GM vaginal cream  Commonly known as: PREMARIN     enoxaparin 40 MG/0.4ML injection  Commonly known as: LOVENOX     famotidine 20 MG tablet  Commonly known as: PEPCID     folic acid 1 MG tablet  Commonly known as: FOLVITE     gabapentin 100 MG capsule  Commonly known as: NEURONTIN     gabapentin 300 MG capsule  Commonly known  as: NEURONTIN     HYDROcodone-acetaminophen 5-325 MG per tablet  Commonly known as: NORCO     lactulose 10 GM/15ML solution  Commonly known as: CHRONULAC     melatonin 5 MG Tabs tablet     mirabegron 50 MG Tb24  Commonly known as: MYRBETRIQ     nicotine 14 MG/24HR  Commonly known as: NICODERM CQ     oxyBUTYnin 3.9 MG/24HR  Commonly known as: OXYTROL     rivastigmine 9.5 MG/24HR  Commonly known as: EXELON     sodium phosphate 7-19 GM/118ML     sucralfate 1 GM tablet  Commonly known as: CARAFATE     tiZANidine 4 MG tablet  Commonly known as: ZANAFLEX               Where to Get Your Medications        Information about where to get these medications is not yet available    Ask your nurse or doctor about these medications  pantoprazole 40 MG tablet           Note that more than 30 minutes was spent in preparing discharge papers, discussing discharge with patient, medication review, etc.    Signed:  Electronically signed by Lizet Mcpherson MD on 1/20/2024 at 11:58 AM

## 2024-01-20 NOTE — CARE COORDINATION
1/20/2024  Social Work Discharge Planning:  SW notified guardian and [facility] liaison of Pts discharge back to [place]. Electronically signed by EMERALD Lacy on 1/20/2024 at 12:16 PM

## 2024-01-20 NOTE — PROGRESS NOTES
Patient set up [facility] to return to [place] via wheel chair today. ETA  1500.  Beverley Greer RN

## 2024-01-20 NOTE — PROGRESS NOTES
Name:  Elizabeth Rose  :  1970  MRN:  [id number]  Room:  [address] [place] [address]  DOS:  2024    [facility]  [facility]  Dr. Jackie Rosen M.D.  Dr. Sunil Miramontes M.D.  Dr. Benson Soriano D.O.  Dr. Dequan Taylor M.D.  Dr. Moisés Meza D.O.    -NP Progress Note-    PCP:  Win Castillo  Admitting Physician:  Wade Templeton DO  Chief Complaint:    Chief Complaint   Patient presents with    Abdominal Pain     Diffuse upper abd pain, emesis started this evening, coffee ground per NH staff, on plavix       Subjective  Patient resting in bed.  Some confusion.  Denies abdominal pain.  Denies nausea or vomiting.    Physical Examination  Vitals:  /77   Pulse 53   Temp 98.9 °F (37.2 °C) (Oral)   Resp 16   Ht 1.727 m (5' 8\")   Wt 73 kg (160 lb 15 oz)   SpO2 96%   BMI 24.47 kg/m²   General Appearance:  awake, alert, and oriented, some confusion, appears stated age and cooperative; no apparent distress no labored breathing  HEENT:  PERRL; EOMI; sclera clear; buccal mucosa moist  Neck:  supple; trachea midline; no thyromegaly; no JVD; no bruits  Heart:  rhythm regular; rate controlled; no murmurs  Lungs:  symmetrical; clear to auscultation bilaterally; no wheezes; no rhonchi; no rales  Abdomen:  soft, non-tender, non-distended; bowel sounds positive; no organomegaly or masses;   Extremities:  peripheral pulses present; no peripheral edema; no ulcers  Neurologic:  alert and oriented, some confusion; no focal deficit; cranial nerves grossly intact  Skin:  no petechia; no hemorrhage; no wounds    Medications  Scheduled Meds    dextromethorphan-quiNIDine  1 capsule Oral BID    docusate sodium  100 mg Oral BID    ferrous sulfate  325 mg Oral Daily with breakfast    fluvoxaMINE  100 mg Oral Daily    oxyBUTYnin  5 mg Oral Daily    paliperidone  9 mg Oral QAM    pantoprazole  40 mg Oral QAM AC    sodium chloride flush  5-40 mL IntraVENous 2 times per day     Infusion Meds    sodium chloride      sodium         or multiple Diagnosis Code(s):        - K92.2, Gastrointestinal hemorrhage, unspecified        - K21.00, Gastro-esophageal reflux disease with esophagitis, without bleeding        - K29.70, Gastritis, unspecified, without bleeding       CPT(R) - 2023 copyright [employer]. All Rights Reserved.       The CPT codes, CCI edits and ICD codes generated are intended as suggestions       and were generated based on input data.  These codes are preliminary and upon        review may be revised to meet current compliance and payer requirements.       The provider is responsible for the final determination of appropriate codes,       and modifiers. LARRY YOU This document has been electronically signed. Note Initiated:1/19/2024 Note Completed:1/19/2024 3:50 PM    CT ABDOMEN PELVIS W IV CONTRAST Additional Contrast? None    Result Date: 1/18/2024  EXAMINATION: CT OF THE ABDOMEN AND PELVIS WITH CONTRAST 1/18/2024 1:22 am TECHNIQUE: CT of the abdomen and pelvis was performed with the administration of intravenous contrast. Multiplanar reformatted images are provided for review. Automated exposure control, iterative reconstruction, and/or weight based adjustment of the mA/kV was utilized to reduce the radiation dose to as low as reasonably achievable. COMPARISON: None. HISTORY: ORDERING SYSTEM PROVIDED HISTORY: n/v TECHNOLOGIST PROVIDED HISTORY: Additional Contrast?->None Reason for exam:->n/v Decision Support Exception - unselect if not a suspected or confirmed emergency medical condition->Emergency Medical Condition (MA) FINDINGS: LOWER CHEST: Visualized lung bases are clear. LIVER: Unremarkable. BILIARY: CBD is at the upper normal limits measuring 6 mm.  No calcified gallstone.  Note that there is a calcification adjacent to the gallbladder neck which appears to be extraluminal and is unchanged from 2019. SPLEEN: Unremarkable. PANCREAS: Grossly unremarkable. ADRENALS: Unremarkable. KIDNEYS:

## 2024-01-22 LAB
ABO/RH: NORMAL
ANTIBODY SCREEN: NEGATIVE
ARM BAND NUMBER: NORMAL
BLOOD BANK DISPENSE STATUS: NORMAL
BLOOD BANK DISPENSE STATUS: NORMAL
BLOOD BANK SAMPLE EXPIRATION: NORMAL
BPU ID: NORMAL
BPU ID: NORMAL
COMPONENT: NORMAL
COMPONENT: NORMAL
CROSSMATCH RESULT: NORMAL
CROSSMATCH RESULT: NORMAL
TRANSFUSION STATUS: NORMAL
TRANSFUSION STATUS: NORMAL
UNIT DIVISION: 0
UNIT DIVISION: 0

## 2024-01-25 LAB — SURGICAL PATHOLOGY REPORT: NORMAL

## 2024-12-26 ENCOUNTER — APPOINTMENT (OUTPATIENT)
Dept: GENERAL RADIOLOGY | Age: 54
End: 2024-12-26
Payer: MEDICAID

## 2024-12-26 ENCOUNTER — HOSPITAL ENCOUNTER (EMERGENCY)
Age: 54
Discharge: HOME | End: 2024-12-26
Attending: STUDENT IN AN ORGANIZED HEALTH CARE EDUCATION/TRAINING PROGRAM
Payer: MEDICAID

## 2024-12-26 VITALS
BODY MASS INDEX: 24.25 KG/M2 | SYSTOLIC BLOOD PRESSURE: 132 MMHG | OXYGEN SATURATION: 100 % | WEIGHT: 160 LBS | DIASTOLIC BLOOD PRESSURE: 99 MMHG | RESPIRATION RATE: 16 BRPM | TEMPERATURE: 97.7 F | HEIGHT: 68 IN | HEART RATE: 72 BPM

## 2024-12-26 DIAGNOSIS — M79.18 MUSCULOSKELETAL PAIN: Primary | ICD-10-CM

## 2024-12-26 LAB
ALBUMIN SERPL-MCNC: 4.1 G/DL (ref 3.5–5.2)
ALP SERPL-CCNC: 68 U/L (ref 35–104)
ALT SERPL-CCNC: 11 U/L (ref 0–32)
ANION GAP SERPL CALCULATED.3IONS-SCNC: 11 MMOL/L (ref 7–16)
AST SERPL-CCNC: 15 U/L (ref 0–31)
BASOPHILS # BLD: 0.06 K/UL (ref 0–0.2)
BASOPHILS NFR BLD: 1 % (ref 0–2)
BILIRUB SERPL-MCNC: 0.2 MG/DL (ref 0–1.2)
BNP SERPL-MCNC: 198 PG/ML (ref 0–125)
BUN SERPL-MCNC: 24 MG/DL (ref 6–20)
CALCIUM SERPL-MCNC: 9 MG/DL (ref 8.6–10.2)
CHLORIDE SERPL-SCNC: 103 MMOL/L (ref 98–107)
CO2 SERPL-SCNC: 26 MMOL/L (ref 22–29)
CREAT SERPL-MCNC: 1.3 MG/DL (ref 0.5–1)
EOSINOPHIL # BLD: 0.16 K/UL (ref 0.05–0.5)
EOSINOPHILS RELATIVE PERCENT: 2 % (ref 0–6)
ERYTHROCYTE [DISTWIDTH] IN BLOOD BY AUTOMATED COUNT: 12.9 % (ref 11.5–15)
GFR, ESTIMATED: 47 ML/MIN/1.73M2
GLUCOSE SERPL-MCNC: 100 MG/DL (ref 74–99)
HCT VFR BLD AUTO: 40.7 % (ref 34–48)
HGB BLD-MCNC: 13.3 G/DL (ref 11.5–15.5)
IMM GRANULOCYTES # BLD AUTO: 0.03 K/UL (ref 0–0.58)
IMM GRANULOCYTES NFR BLD: 0 % (ref 0–5)
LYMPHOCYTES NFR BLD: 1.5 K/UL (ref 1.5–4)
LYMPHOCYTES RELATIVE PERCENT: 16 % (ref 20–42)
MCH RBC QN AUTO: 32.5 PG (ref 26–35)
MCHC RBC AUTO-ENTMCNC: 32.7 G/DL (ref 32–34.5)
MCV RBC AUTO: 99.5 FL (ref 80–99.9)
MONOCYTES NFR BLD: 0.62 K/UL (ref 0.1–0.95)
MONOCYTES NFR BLD: 7 % (ref 2–12)
NEUTROPHILS NFR BLD: 75 % (ref 43–80)
NEUTS SEG NFR BLD: 7.04 K/UL (ref 1.8–7.3)
PLATELET # BLD AUTO: 210 K/UL (ref 130–450)
PMV BLD AUTO: 11 FL (ref 7–12)
POTASSIUM SERPL-SCNC: 4.1 MMOL/L (ref 3.5–5)
PROT SERPL-MCNC: 6.8 G/DL (ref 6.4–8.3)
RBC # BLD AUTO: 4.09 M/UL (ref 3.5–5.5)
SODIUM SERPL-SCNC: 140 MMOL/L (ref 132–146)
TROPONIN I SERPL HS-MCNC: <6 NG/L (ref 0–9)
WBC OTHER # BLD: 9.4 K/UL (ref 4.5–11.5)

## 2024-12-26 PROCEDURE — 83880 ASSAY OF NATRIURETIC PEPTIDE: CPT

## 2024-12-26 PROCEDURE — 99285 EMERGENCY DEPT VISIT HI MDM: CPT

## 2024-12-26 PROCEDURE — 85025 COMPLETE CBC W/AUTO DIFF WBC: CPT

## 2024-12-26 PROCEDURE — 93005 ELECTROCARDIOGRAM TRACING: CPT

## 2024-12-26 PROCEDURE — 71045 X-RAY EXAM CHEST 1 VIEW: CPT

## 2024-12-26 PROCEDURE — 84484 ASSAY OF TROPONIN QUANT: CPT

## 2024-12-26 PROCEDURE — 80053 COMPREHEN METABOLIC PANEL: CPT

## 2024-12-26 PROCEDURE — 2580000003 HC RX 258

## 2024-12-26 PROCEDURE — 6370000000 HC RX 637 (ALT 250 FOR IP)

## 2024-12-26 RX ORDER — 0.9 % SODIUM CHLORIDE 0.9 %
500 INTRAVENOUS SOLUTION INTRAVENOUS ONCE
Status: COMPLETED | OUTPATIENT
Start: 2024-12-26 | End: 2024-12-26

## 2024-12-26 RX ORDER — NAPROXEN 250 MG/1
500 TABLET ORAL ONCE
Status: COMPLETED | OUTPATIENT
Start: 2024-12-26 | End: 2024-12-26

## 2024-12-26 RX ADMIN — NAPROXEN 500 MG: 250 TABLET ORAL at 19:58

## 2024-12-26 RX ADMIN — SODIUM CHLORIDE 500 ML: 9 INJECTION, SOLUTION INTRAVENOUS at 18:01

## 2024-12-26 ASSESSMENT — PAIN DESCRIPTION - LOCATION
LOCATION: CHEST
LOCATION: CHEST

## 2024-12-26 ASSESSMENT — PAIN - FUNCTIONAL ASSESSMENT: PAIN_FUNCTIONAL_ASSESSMENT: 0-10

## 2024-12-26 ASSESSMENT — PAIN DESCRIPTION - DESCRIPTORS: DESCRIPTORS: ACHING

## 2024-12-26 ASSESSMENT — PAIN SCALES - GENERAL
PAINLEVEL_OUTOF10: 8
PAINLEVEL_OUTOF10: 8

## 2024-12-26 NOTE — ED PROVIDER NOTES
[facility] EMERGENCY DEPARTMENT  EMERGENCY DEPARTMENT ENCOUNTER        Pt Name: Elizabeth Rose  MRN: [id number]  Birthdate 1970  Date of evaluation: 12/26/2024  Provider: Zurdo Hobson II, DO  PCP: iWn Castillo MD  Note Started: 3:55 PM EST 12/26/24    CHIEF COMPLAINT       Chief Complaint   Patient presents with    Chest Pain     Chest pain for last 2 days        HISTORY OF PRESENT ILLNESS: 1 or more Elements            Elizabeth Rose is a 54 y.o. female hx of HTN, dementia, residual who presents for c/o left sided chest pain that started x2 day ago.  Pain is sharp, worse when she pushes on it or moves her torso.  She denies any diaphoresis, nausea, vomiting, paresthesias.  No lightheadedness or dizziness, has not had this pain in the past.  Patient denies any recent leg swelling or calf pain, denies any recent cough or congestion.    Nursing Notes were all reviewed and agreed with or any disagreements were addressed in the HPI.      REVIEW OF EXTERNAL NOTE :       Records reviewed for medical hx, surgical, hx, and medication lists      Chart Review/External Note Review    Last Echo reviewed by Me:  Lab Results   Component Value Date    LVEF 63 06/12/2020             Controlled Substance Monitoring:    Acute and Chronic Pain Monitoring:   RX Monitoring Attestation Periodic Controlled Substance Monitoring   12/9/2017  11:56 AM The Prescription Monitoring Report was requested today but not available. Possible medication side effects, risk of tolerance and/or dependence, and alternative treatments discussed.;No signs of potential drug abuse or diversion identified.           REVIEW OF SYSTEMS :      Positives and Pertinent negatives as per HPI.     SURGICAL HISTORY     Past Surgical History:   Procedure Laterality Date    FOOT SURGERY      LEEP      UPPER GASTROINTESTINAL ENDOSCOPY N/A 1/19/2024    EGD ESOPHAGOGASTRODUODENOSCOPY WITH BIOPSIES performed by 
are visualized and preliminarily interpreted by the ED Provider with the findings documented in the ED Course.    Interpretation per the Radiologist below, if available at the time of this note:    XR CHEST PORTABLE   Final Result   No acute process.           XR CHEST PORTABLE    Result Date: 12/26/2024  EXAMINATION: ONE XRAY VIEW OF THE CHEST 12/26/2024 4:14 pm COMPARISON: None. HISTORY: ORDERING SYSTEM PROVIDED HISTORY: chest pain TECHNOLOGIST PROVIDED HISTORY: Reason for exam:->chest pain FINDINGS: The lungs are without acute focal process.  There is no effusion or pneumothorax. The cardiomediastinal silhouette is without acute process. The osseous structures are without acute process.     No acute process.       No results found.    PROCEDURES   Unless otherwise noted below, none       CRITICAL CARE TIME (.cct)   none    PAST MEDICAL HISTORY/Chronic Conditions Affecting Care      has a past medical history of Acute kidney failure with tubular necrosis (HCC), Anoxic brain damage (HCC), Anoxic brain injury (HCC), Bipolar 1 disorder (HCC), Bipolar disorder (HCC), Cardiac arrest (HCC), Cerebral artery occlusion with cerebral infarction (HCC), Cocaine abuse (HCC), COVID-19, CVA (cerebral vascular accident) (HCC), Dementia (HCC), Depressed, Depression, GERD (gastroesophageal reflux disease), HLD (hyperlipidemia), HTN (hypertension), Hyperlipidemia, Hyperlipidemia, Hypertension, Iron deficiency anemia, Pseudobulbar affect, Schizo affective schizophrenia (HCC), Schizoaffective disorder (HCC), Suicidal ideation, Suicidal ideations, Tachycardia, and Tachycardia.     EMERGENCY DEPARTMENT COURSE    Vitals:    Vitals:    12/26/24 1554 12/26/24 1802   BP: (!) 132/99    Pulse: (!) 107 72   Resp: 16 16   Temp: 97.7 °F (36.5 °C)    TempSrc: Oral    SpO2: 100% 100%   Weight: 72.6 kg (160 lb)    Height: 1.727 m (5' 8\")        Patient was given the following medications:  Medications   naproxen (NAPROSYN) tablet 500 mg (has no

## 2024-12-27 LAB
EKG ATRIAL RATE: 58 BPM
EKG P AXIS: 62 DEGREES
EKG P-R INTERVAL: 150 MS
EKG Q-T INTERVAL: 424 MS
EKG QRS DURATION: 80 MS
EKG QTC CALCULATION (BAZETT): 416 MS
EKG R AXIS: 39 DEGREES
EKG T AXIS: 65 DEGREES
EKG VENTRICULAR RATE: 58 BPM

## 2024-12-27 PROCEDURE — 93010 ELECTROCARDIOGRAM REPORT: CPT | Performed by: INTERNAL MEDICINE

## 2024-12-27 NOTE — DISCHARGE INSTRUCTIONS
Please follow-up with your primary care doctor.  Please return to the ER for any new or worsening symptoms such as worsening chest pain, shortness of breath, fevers, chills, lightheadedness, dizziness, numbness or tingling in your hands or toes,

## (undated) DEVICE — SPONGE GZ W4XL4IN RAYON POLY CVR W/NONWOVEN FAB STRL 2/PK

## (undated) DEVICE — FORCEPS BX OVL CUP FEN DISPOSABLE CAP L 160CM RAD JAW 4

## (undated) DEVICE — GRADUATE TRIANG MEASURE 1000ML BLK PRNT

## (undated) DEVICE — BLOCK BITE 60FR RUBBER ADLT DENTAL